# Patient Record
Sex: FEMALE | Race: WHITE | NOT HISPANIC OR LATINO | Employment: FULL TIME
[De-identification: names, ages, dates, MRNs, and addresses within clinical notes are randomized per-mention and may not be internally consistent; named-entity substitution may affect disease eponyms.]

---

## 2017-01-13 ENCOUNTER — RECORDS - HEALTHEAST (OUTPATIENT)
Dept: ADMINISTRATIVE | Facility: OTHER | Age: 32
End: 2017-01-13

## 2017-01-14 ENCOUNTER — COMMUNICATION - HEALTHEAST (OUTPATIENT)
Dept: FAMILY MEDICINE | Facility: CLINIC | Age: 32
End: 2017-01-14

## 2017-02-24 ENCOUNTER — COMMUNICATION - HEALTHEAST (OUTPATIENT)
Dept: FAMILY MEDICINE | Facility: CLINIC | Age: 32
End: 2017-02-24

## 2017-03-11 ENCOUNTER — RECORDS - HEALTHEAST (OUTPATIENT)
Dept: ADMINISTRATIVE | Facility: OTHER | Age: 32
End: 2017-03-11

## 2017-05-11 ENCOUNTER — RECORDS - HEALTHEAST (OUTPATIENT)
Dept: ADMINISTRATIVE | Facility: OTHER | Age: 32
End: 2017-05-11

## 2017-06-03 ENCOUNTER — COMMUNICATION - HEALTHEAST (OUTPATIENT)
Dept: FAMILY MEDICINE | Facility: CLINIC | Age: 32
End: 2017-06-03

## 2017-08-02 ENCOUNTER — COMMUNICATION - HEALTHEAST (OUTPATIENT)
Dept: FAMILY MEDICINE | Facility: CLINIC | Age: 32
End: 2017-08-02

## 2017-08-21 ENCOUNTER — AMBULATORY - HEALTHEAST (OUTPATIENT)
Dept: FAMILY MEDICINE | Facility: CLINIC | Age: 32
End: 2017-08-21

## 2017-08-21 ENCOUNTER — OFFICE VISIT - HEALTHEAST (OUTPATIENT)
Dept: FAMILY MEDICINE | Facility: CLINIC | Age: 32
End: 2017-08-21

## 2017-08-21 DIAGNOSIS — J20.9 BRONCHITIS WITH BRONCHOSPASM: ICD-10-CM

## 2017-08-21 DIAGNOSIS — R06.2 WHEEZING: ICD-10-CM

## 2017-08-21 ASSESSMENT — MIFFLIN-ST. JEOR: SCORE: 1468.79

## 2017-10-03 ENCOUNTER — COMMUNICATION - HEALTHEAST (OUTPATIENT)
Dept: FAMILY MEDICINE | Facility: CLINIC | Age: 32
End: 2017-10-03

## 2017-11-15 ENCOUNTER — OFFICE VISIT - HEALTHEAST (OUTPATIENT)
Dept: FAMILY MEDICINE | Facility: CLINIC | Age: 32
End: 2017-11-15

## 2017-11-15 DIAGNOSIS — F17.200 TOBACCO USE DISORDER: ICD-10-CM

## 2017-11-15 DIAGNOSIS — J01.90 ACUTE SINUSITIS: ICD-10-CM

## 2017-11-15 DIAGNOSIS — J45.41 MODERATE PERSISTENT ASTHMA WITH EXACERBATION: ICD-10-CM

## 2017-11-15 DIAGNOSIS — R05.9 COUGH: ICD-10-CM

## 2018-05-31 ENCOUNTER — OFFICE VISIT - HEALTHEAST (OUTPATIENT)
Dept: FAMILY MEDICINE | Facility: CLINIC | Age: 33
End: 2018-05-31

## 2018-05-31 DIAGNOSIS — B00.9 HERPES SIMPLEX TYPE 1 INFECTION: ICD-10-CM

## 2018-05-31 DIAGNOSIS — H10.13 ALLERGIC CONJUNCTIVITIS OF BOTH EYES: ICD-10-CM

## 2018-05-31 ASSESSMENT — MIFFLIN-ST. JEOR: SCORE: 1484.67

## 2018-06-08 ENCOUNTER — OFFICE VISIT - HEALTHEAST (OUTPATIENT)
Dept: FAMILY MEDICINE | Facility: CLINIC | Age: 33
End: 2018-06-08

## 2018-06-08 DIAGNOSIS — H10.9 CONJUNCTIVITIS: ICD-10-CM

## 2018-06-08 ASSESSMENT — MIFFLIN-ST. JEOR: SCORE: 1482.4

## 2018-07-09 ENCOUNTER — COMMUNICATION - HEALTHEAST (OUTPATIENT)
Dept: FAMILY MEDICINE | Facility: CLINIC | Age: 33
End: 2018-07-09

## 2018-07-10 ENCOUNTER — COMMUNICATION - HEALTHEAST (OUTPATIENT)
Dept: FAMILY MEDICINE | Facility: CLINIC | Age: 33
End: 2018-07-10

## 2018-07-13 ENCOUNTER — AMBULATORY - HEALTHEAST (OUTPATIENT)
Dept: FAMILY MEDICINE | Facility: CLINIC | Age: 33
End: 2018-07-13

## 2018-07-13 ENCOUNTER — OFFICE VISIT - HEALTHEAST (OUTPATIENT)
Dept: FAMILY MEDICINE | Facility: CLINIC | Age: 33
End: 2018-07-13

## 2018-07-13 DIAGNOSIS — H10.9 CONJUNCTIVITIS: ICD-10-CM

## 2018-07-25 ENCOUNTER — COMMUNICATION - HEALTHEAST (OUTPATIENT)
Dept: FAMILY MEDICINE | Facility: CLINIC | Age: 33
End: 2018-07-25

## 2018-11-23 ENCOUNTER — OFFICE VISIT - HEALTHEAST (OUTPATIENT)
Dept: FAMILY MEDICINE | Facility: CLINIC | Age: 33
End: 2018-11-23

## 2018-11-23 DIAGNOSIS — G43.829 MENSTRUAL MIGRAINE WITHOUT STATUS MIGRAINOSUS, NOT INTRACTABLE: ICD-10-CM

## 2018-11-23 DIAGNOSIS — L20.9 ATOPIC DERMATITIS, UNSPECIFIED TYPE: ICD-10-CM

## 2018-11-23 DIAGNOSIS — Z30.09 ENCOUNTER FOR COUNSELING REGARDING CONTRACEPTION: ICD-10-CM

## 2018-11-23 ASSESSMENT — MIFFLIN-ST. JEOR: SCORE: 1455.19

## 2018-11-29 ENCOUNTER — OFFICE VISIT - HEALTHEAST (OUTPATIENT)
Dept: FAMILY MEDICINE | Facility: CLINIC | Age: 33
End: 2018-11-29

## 2018-11-29 DIAGNOSIS — L50.9 URTICARIA: ICD-10-CM

## 2018-12-07 ENCOUNTER — OFFICE VISIT - HEALTHEAST (OUTPATIENT)
Dept: FAMILY MEDICINE | Facility: CLINIC | Age: 33
End: 2018-12-07

## 2018-12-07 DIAGNOSIS — R21 RASH IN ADULT: ICD-10-CM

## 2018-12-31 ENCOUNTER — RECORDS - HEALTHEAST (OUTPATIENT)
Dept: ADMINISTRATIVE | Facility: OTHER | Age: 33
End: 2018-12-31

## 2019-01-25 ENCOUNTER — OFFICE VISIT - HEALTHEAST (OUTPATIENT)
Dept: FAMILY MEDICINE | Facility: CLINIC | Age: 34
End: 2019-01-25

## 2019-01-25 DIAGNOSIS — B00.9 HERPES SIMPLEX TYPE 1 INFECTION: ICD-10-CM

## 2019-01-25 DIAGNOSIS — F17.200 SMOKING: ICD-10-CM

## 2019-01-25 DIAGNOSIS — J45.41 MODERATE PERSISTENT ASTHMA WITH EXACERBATION: ICD-10-CM

## 2019-01-25 DIAGNOSIS — J06.9 UPPER RESPIRATORY TRACT INFECTION, UNSPECIFIED TYPE: ICD-10-CM

## 2019-01-25 RX ORDER — ALBUTEROL SULFATE 90 UG/1
2 AEROSOL, METERED RESPIRATORY (INHALATION) EVERY 4 HOURS PRN
Qty: 1 EACH | Refills: 0 | Status: SHIPPED | OUTPATIENT
Start: 2019-01-25 | End: 2021-11-05

## 2019-01-25 RX ORDER — BUDESONIDE AND FORMOTEROL FUMARATE DIHYDRATE 160; 4.5 UG/1; UG/1
2 AEROSOL RESPIRATORY (INHALATION) 2 TIMES DAILY
Qty: 1 INHALER | Refills: 1 | Status: SHIPPED | OUTPATIENT
Start: 2019-01-25 | End: 2021-11-05

## 2019-01-25 ASSESSMENT — MIFFLIN-ST. JEOR: SCORE: 1491.47

## 2019-01-31 ENCOUNTER — OFFICE VISIT - HEALTHEAST (OUTPATIENT)
Dept: FAMILY MEDICINE | Facility: CLINIC | Age: 34
End: 2019-01-31

## 2019-01-31 DIAGNOSIS — E70.1 PHENYLKETONURIA (PKU) (H): ICD-10-CM

## 2019-01-31 DIAGNOSIS — F17.200 SMOKING: ICD-10-CM

## 2019-01-31 DIAGNOSIS — J20.9 ACUTE BRONCHITIS, UNSPECIFIED ORGANISM: ICD-10-CM

## 2019-01-31 ASSESSMENT — MIFFLIN-ST. JEOR: SCORE: 1514.15

## 2019-03-19 ENCOUNTER — COMMUNICATION - HEALTHEAST (OUTPATIENT)
Dept: FAMILY MEDICINE | Facility: CLINIC | Age: 34
End: 2019-03-19

## 2019-06-24 ENCOUNTER — OFFICE VISIT - HEALTHEAST (OUTPATIENT)
Dept: FAMILY MEDICINE | Facility: CLINIC | Age: 34
End: 2019-06-24

## 2019-06-24 DIAGNOSIS — F17.200 SMOKING: ICD-10-CM

## 2019-06-24 DIAGNOSIS — B00.9 HERPES SIMPLEX TYPE 1 INFECTION: ICD-10-CM

## 2019-06-24 DIAGNOSIS — E66.3 OVERWEIGHT: ICD-10-CM

## 2019-06-24 DIAGNOSIS — Z30.09 BIRTH CONTROL COUNSELING: ICD-10-CM

## 2019-06-24 DIAGNOSIS — B07.9 VIRAL WARTS, UNSPECIFIED TYPE: ICD-10-CM

## 2019-06-24 ASSESSMENT — MIFFLIN-ST. JEOR: SCORE: 1446.57

## 2019-07-12 ENCOUNTER — OFFICE VISIT - HEALTHEAST (OUTPATIENT)
Dept: FAMILY MEDICINE | Facility: CLINIC | Age: 34
End: 2019-07-12

## 2019-07-12 DIAGNOSIS — B07.9 VIRAL WARTS, UNSPECIFIED TYPE: ICD-10-CM

## 2019-07-12 ASSESSMENT — MIFFLIN-ST. JEOR: SCORE: 1438.86

## 2019-09-12 ENCOUNTER — COMMUNICATION - HEALTHEAST (OUTPATIENT)
Dept: FAMILY MEDICINE | Facility: CLINIC | Age: 34
End: 2019-09-12

## 2019-09-16 ENCOUNTER — COMMUNICATION - HEALTHEAST (OUTPATIENT)
Dept: FAMILY MEDICINE | Facility: CLINIC | Age: 34
End: 2019-09-16

## 2019-09-16 DIAGNOSIS — G43.829 MENSTRUAL MIGRAINE WITHOUT STATUS MIGRAINOSUS, NOT INTRACTABLE: ICD-10-CM

## 2019-09-16 DIAGNOSIS — Z30.09 BIRTH CONTROL COUNSELING: ICD-10-CM

## 2019-09-26 ENCOUNTER — OFFICE VISIT - HEALTHEAST (OUTPATIENT)
Dept: FAMILY MEDICINE | Facility: CLINIC | Age: 34
End: 2019-09-26

## 2019-09-26 DIAGNOSIS — J45.41 MODERATE PERSISTENT ASTHMA WITH EXACERBATION: ICD-10-CM

## 2019-09-26 DIAGNOSIS — F17.200 SMOKING: ICD-10-CM

## 2019-09-26 DIAGNOSIS — R05.9 COUGH: ICD-10-CM

## 2019-09-26 DIAGNOSIS — J20.9 ACUTE BRONCHITIS, UNSPECIFIED ORGANISM: ICD-10-CM

## 2019-09-26 ASSESSMENT — MIFFLIN-ST. JEOR: SCORE: 1460.18

## 2019-12-12 ENCOUNTER — COMMUNICATION - HEALTHEAST (OUTPATIENT)
Dept: FAMILY MEDICINE | Facility: CLINIC | Age: 34
End: 2019-12-12

## 2019-12-12 DIAGNOSIS — G43.829 MENSTRUAL MIGRAINE WITHOUT STATUS MIGRAINOSUS, NOT INTRACTABLE: ICD-10-CM

## 2020-01-24 ENCOUNTER — COMMUNICATION - HEALTHEAST (OUTPATIENT)
Dept: FAMILY MEDICINE | Facility: CLINIC | Age: 35
End: 2020-01-24

## 2020-01-24 DIAGNOSIS — G43.829 MENSTRUAL MIGRAINE WITHOUT STATUS MIGRAINOSUS, NOT INTRACTABLE: ICD-10-CM

## 2020-02-10 ENCOUNTER — COMMUNICATION - HEALTHEAST (OUTPATIENT)
Dept: FAMILY MEDICINE | Facility: CLINIC | Age: 35
End: 2020-02-10

## 2020-02-10 DIAGNOSIS — B00.9 HERPES SIMPLEX TYPE 1 INFECTION: ICD-10-CM

## 2020-05-26 ENCOUNTER — COMMUNICATION - HEALTHEAST (OUTPATIENT)
Dept: FAMILY MEDICINE | Facility: CLINIC | Age: 35
End: 2020-05-26

## 2020-05-26 DIAGNOSIS — G43.829 MENSTRUAL MIGRAINE WITHOUT STATUS MIGRAINOSUS, NOT INTRACTABLE: ICD-10-CM

## 2020-07-22 ENCOUNTER — COMMUNICATION - HEALTHEAST (OUTPATIENT)
Dept: FAMILY MEDICINE | Facility: CLINIC | Age: 35
End: 2020-07-22

## 2020-07-22 DIAGNOSIS — G43.829 MENSTRUAL MIGRAINE WITHOUT STATUS MIGRAINOSUS, NOT INTRACTABLE: ICD-10-CM

## 2020-07-31 ENCOUNTER — COMMUNICATION - HEALTHEAST (OUTPATIENT)
Dept: SCHEDULING | Facility: CLINIC | Age: 35
End: 2020-07-31

## 2020-07-31 ENCOUNTER — OFFICE VISIT - HEALTHEAST (OUTPATIENT)
Dept: FAMILY MEDICINE | Facility: CLINIC | Age: 35
End: 2020-07-31

## 2020-07-31 DIAGNOSIS — J45.40 MODERATE PERSISTENT ASTHMA WITHOUT COMPLICATION: ICD-10-CM

## 2020-07-31 DIAGNOSIS — J01.00 ACUTE NON-RECURRENT MAXILLARY SINUSITIS: ICD-10-CM

## 2020-07-31 DIAGNOSIS — K08.89 PAIN, DENTAL: ICD-10-CM

## 2020-09-18 ENCOUNTER — COMMUNICATION - HEALTHEAST (OUTPATIENT)
Dept: FAMILY MEDICINE | Facility: CLINIC | Age: 35
End: 2020-09-18

## 2020-09-18 DIAGNOSIS — Z30.09 BIRTH CONTROL COUNSELING: ICD-10-CM

## 2020-09-18 DIAGNOSIS — G43.829 MENSTRUAL MIGRAINE WITHOUT STATUS MIGRAINOSUS, NOT INTRACTABLE: ICD-10-CM

## 2020-09-20 RX ORDER — NORELGESTROMIN AND ETHINYL ESTRADIOL 150; 35 UG/D; UG/D
PATCH TRANSDERMAL
Qty: 12 PATCH | Refills: 2 | Status: SHIPPED | OUTPATIENT
Start: 2020-09-20 | End: 2021-11-05

## 2020-12-21 ENCOUNTER — COMMUNICATION - HEALTHEAST (OUTPATIENT)
Dept: FAMILY MEDICINE | Facility: CLINIC | Age: 35
End: 2020-12-21

## 2020-12-21 DIAGNOSIS — G43.829 MENSTRUAL MIGRAINE WITHOUT STATUS MIGRAINOSUS, NOT INTRACTABLE: ICD-10-CM

## 2021-03-26 ENCOUNTER — COMMUNICATION - HEALTHEAST (OUTPATIENT)
Dept: FAMILY MEDICINE | Facility: CLINIC | Age: 36
End: 2021-03-26

## 2021-03-26 DIAGNOSIS — G43.829 MENSTRUAL MIGRAINE WITHOUT STATUS MIGRAINOSUS, NOT INTRACTABLE: ICD-10-CM

## 2021-03-27 RX ORDER — NAPROXEN 500 MG/1
500 TABLET ORAL 2 TIMES DAILY WITH MEALS
Qty: 60 TABLET | Refills: 0 | Status: SHIPPED | OUTPATIENT
Start: 2021-03-27 | End: 2021-11-05

## 2021-03-29 ENCOUNTER — COMMUNICATION - HEALTHEAST (OUTPATIENT)
Dept: FAMILY MEDICINE | Facility: CLINIC | Age: 36
End: 2021-03-29

## 2021-03-29 DIAGNOSIS — G43.829 MENSTRUAL MIGRAINE WITHOUT STATUS MIGRAINOSUS, NOT INTRACTABLE: ICD-10-CM

## 2021-03-29 DIAGNOSIS — B00.9 HERPES SIMPLEX TYPE 1 INFECTION: ICD-10-CM

## 2021-04-05 ENCOUNTER — COMMUNICATION - HEALTHEAST (OUTPATIENT)
Dept: FAMILY MEDICINE | Facility: CLINIC | Age: 36
End: 2021-04-05

## 2021-04-05 DIAGNOSIS — B00.9 HERPES SIMPLEX TYPE 1 INFECTION: ICD-10-CM

## 2021-04-30 ENCOUNTER — COMMUNICATION - HEALTHEAST (OUTPATIENT)
Dept: FAMILY MEDICINE | Facility: CLINIC | Age: 36
End: 2021-04-30

## 2021-04-30 DIAGNOSIS — B00.9 HERPES SIMPLEX TYPE 1 INFECTION: ICD-10-CM

## 2021-05-02 RX ORDER — VALACYCLOVIR HYDROCHLORIDE 1 G/1
TABLET, FILM COATED ORAL
Qty: 4 TABLET | Refills: 1 | Status: SHIPPED | OUTPATIENT
Start: 2021-05-02 | End: 2021-11-05

## 2021-05-03 ENCOUNTER — COMMUNICATION - HEALTHEAST (OUTPATIENT)
Dept: FAMILY MEDICINE | Facility: CLINIC | Age: 36
End: 2021-05-03

## 2021-05-28 ASSESSMENT — ASTHMA QUESTIONNAIRES: ACT_TOTALSCORE: 15

## 2021-05-29 NOTE — PROGRESS NOTES
Assessment and Plan:     1. Viral warts, unspecified type  Discussed proper wound care.  If no improvement symptoms in 2 to 4 weeks, she is to follow-up for more cryotherapy.    2. Herpes simplex type 1 infection  She continues valacyclovir as needed.  - valACYclovir (VALTREX) 1000 MG tablet; Take 2 tablets (2,000 mg total) by mouth 2 (two) times a day.  Dispense: 4 tablet; Refill: 6    3. Birth control counseling  Renewed contraceptive patch.  Educated on its indications and side effects including increased risk of blood clots.  - norelgestromin-ethinyl estradiol (XULANE) 150-35 mcg/24 hr; APPLY 1 PATCH EXTERNALLY TO THE SKIN 1 TIME A WEEK  Dispense: 9 patch; Refill: 0    4. Overweight  The following high BMI interventions were performed this visit: encouragement to exercise and lifestyle education regarding diet    5.  Smoking  I have counseled the patient for tobacco cessation and the follow up will occur  at the next visit.    She will schedule a physical within the next 3 months.     Subjective:     Judy is a 34 y.o. female presenting to the clinic for multiple concerns today.  Patient has a wart present on her right hand second finger.  She believes she has had it for over 2 years.  It has not changed in size.  It is not painful.  She has not tried any over-the-counter products for her symptoms.  Patient requests renewal of her contraceptive patch.  She has been a relationship for 20 years.  She has no concerns for STDs.  Her last menstrual period was 2 weeks ago.  Her periods are regular, occurring once per month.  She denies any personal family history of blood clots.  She does continue to smoke cigarettes but states she has cut back.  She requests valacyclovir to use as needed for cold sores.  She denies any recent outbreaks.    Review of Systems: A complete 14 point review of systems was obtained and is negative or as stated in the history of present illness.    Social History     Socioeconomic History  "    Marital status: Single     Spouse name: Not on file     Number of children: Not on file     Years of education: Not on file     Highest education level: Not on file   Occupational History     Not on file   Social Needs     Financial resource strain: Not on file     Food insecurity:     Worry: Not on file     Inability: Not on file     Transportation needs:     Medical: Not on file     Non-medical: Not on file   Tobacco Use     Smoking status: Current Every Day Smoker     Packs/day: 1.00     Smokeless tobacco: Never Used   Substance and Sexual Activity     Alcohol use: No     Frequency: Never     Drug use: No     Sexual activity: Not on file   Lifestyle     Physical activity:     Days per week: Not on file     Minutes per session: Not on file     Stress: Not on file   Relationships     Social connections:     Talks on phone: Not on file     Gets together: Not on file     Attends Jewish service: Not on file     Active member of club or organization: Not on file     Attends meetings of clubs or organizations: Not on file     Relationship status: Not on file     Intimate partner violence:     Fear of current or ex partner: Not on file     Emotionally abused: Not on file     Physically abused: Not on file     Forced sexual activity: Not on file   Other Topics Concern     Not on file   Social History Narrative     Not on file       Active Ambulatory Problems     Diagnosis Date Noted     Phenylketonuria      Anxiety      Attention-deficit Hyperactivity Disorder      Smoking 10/04/2016     Resolved Ambulatory Problems     Diagnosis Date Noted     Cerumen Impaction In Both Ears      Sinusitis      Acute bronchitis      Conjunctivitis      No Additional Past Medical History       No family history on file.    Objective:     /68   Pulse 90   Ht 5' 4\" (1.626 m)   Wt 170 lb 1.6 oz (77.2 kg)   SpO2 98%   BMI 29.20 kg/m      Patient is alert, in no obvious distress.   Skin: Warm, dry.   Lungs:  Clear to " auscultation. Respirations even and unlabored.  No wheezing or rales noted.   Heart:  Regular rate and rhythm.  No murmurs.   Musculoskeletal:  She has a small wart present on her right hand, 3rd finger.  I debrided the area, performed cryotherapy freeze-thaw-freeze cycle x 3.  Patient tolerated the procedure well.

## 2021-05-31 VITALS — WEIGHT: 175 LBS | BODY MASS INDEX: 29.88 KG/M2 | HEIGHT: 64 IN

## 2021-05-31 VITALS — WEIGHT: 177 LBS | BODY MASS INDEX: 30.38 KG/M2

## 2021-06-01 ENCOUNTER — COMMUNICATION - HEALTHEAST (OUTPATIENT)
Dept: FAMILY MEDICINE | Facility: CLINIC | Age: 36
End: 2021-06-01

## 2021-06-01 VITALS — WEIGHT: 178 LBS | HEIGHT: 64 IN | BODY MASS INDEX: 30.39 KG/M2

## 2021-06-01 VITALS — WEIGHT: 172 LBS | BODY MASS INDEX: 29.52 KG/M2

## 2021-06-01 VITALS — BODY MASS INDEX: 30.48 KG/M2 | WEIGHT: 178.5 LBS | HEIGHT: 64 IN

## 2021-06-01 DIAGNOSIS — B00.9 HERPES SIMPLEX TYPE 1 INFECTION: ICD-10-CM

## 2021-06-01 NOTE — TELEPHONE ENCOUNTER
RN cannot approve Refill Request    RN can NOT refill this medication med is not covered by policy/route to provider     . Last office visit: 11/23/2018 Abdulkadir Shoemaker MD Last Physical: Visit date not found Last MTM visit: Visit date not found Last visit same specialty: 7/12/2019 Luz Carrasco CNP.  Next visit within 3 mo: Visit date not found  Next physical within 3 mo: Visit date not found      Kyleigh Hay, [facility] Triage/Med Refill 9/16/2019    Requested Prescriptions   Pending Prescriptions Disp Refills     naproxen (NAPROSYN) 500 MG tablet [Pharmacy Med Name: NAPROXEN 500MG TABLETS] 60 tablet 11     Sig: TAKE 1 TABLET(500 MG) BY MOUTH TWICE DAILY WITH MEALS       There is no refill protocol information for this order

## 2021-06-01 NOTE — PROGRESS NOTES
Assessment and Plan:     1. Acute bronchitis, unspecified organism  azithromycin (ZITHROMAX) 250 MG tablet   2. Moderate persistent asthma with exacerbation  predniSONE (DELTASONE) 20 MG tablet   3. Cough  codeine-guaiFENesin (GUAIFENESIN AC)  mg/5 mL liquid   4. Smoking       Unfortunately, x-ray is not available in our clinic.  She does not want to travel to another clinic.  Will treat for bronchitis with a Z-Jay, take as directed.  Educated on its indications and side effects.  Provided prescription for prednisone for asthma exacerbation.  Patient will continue Symbicort daily and albuterol as needed.  Provided prescription for guaifenesin with codeine to use as needed for cough.  She is to avoid taking this with other sedatives.  Discussed smoking cessation options, but she declines.  She will follow-up if symptoms persist or worsen.    Subjective:     Judy is a 34 y.o. female presenting to the clinic for concerns for cold symptoms for 7 days.  Patient complains of sinus congestion, postnasal drainage, productive cough, shortness of breath, chest tightness, wheezing.  She has had difficulty sleeping at night.  She complains of lack of appetite and headache.  She has been using her Symbicort.  She states she has not needed to use her albuterol.  Her boss is also ill.  She has tried taking over-the-counter Kiarra-Volin and Robitussin.  She feels as though her symptoms are worsening.  She does continue to smoke.    Review of Systems: A complete 14 point review of systems was obtained and is negative or as stated in the history of present illness.    Social History     Socioeconomic History     Marital status: Single     Spouse name: Not on file     Number of children: Not on file     Years of education: Not on file     Highest education level: Not on file   Occupational History     Not on file   Social Needs     Financial resource strain: Not on file     Food insecurity:     Worry: Not on file      "Inability: Not on file     Transportation needs:     Medical: Not on file     Non-medical: Not on file   Tobacco Use     Smoking status: Current Every Day Smoker     Packs/day: 1.00     Smokeless tobacco: Never Used   Substance and Sexual Activity     Alcohol use: No     Frequency: Never     Drug use: No     Sexual activity: Not on file   Lifestyle     Physical activity:     Days per week: Not on file     Minutes per session: Not on file     Stress: Not on file   Relationships     Social connections:     Talks on phone: Not on file     Gets together: Not on file     Attends Sabianist service: Not on file     Active member of club or organization: Not on file     Attends meetings of clubs or organizations: Not on file     Relationship status: Not on file     Intimate partner violence:     Fear of current or ex partner: Not on file     Emotionally abused: Not on file     Physically abused: Not on file     Forced sexual activity: Not on file   Other Topics Concern     Not on file   Social History Narrative     Not on file       Active Ambulatory Problems     Diagnosis Date Noted     Phenylketonuria      Anxiety      Attention deficit hyperactivity disorder (ADHD)      Smoking 10/04/2016     Resolved Ambulatory Problems     Diagnosis Date Noted     Cerumen Impaction In Both Ears      Sinusitis      Acute bronchitis      Conjunctivitis      No Additional Past Medical History       No family history on file.    Objective:     /66   Pulse 75   Temp 97.9  F (36.6  C) (Oral)   Ht 5' 4\" (1.626 m)   Wt 173 lb 1.6 oz (78.5 kg)   SpO2 97%   BMI 29.71 kg/m      Patient is alert, in no obvious distress.   Skin: Warm, dry.  No lesions or rashes.  Skin turgor rapid return.   HEENT:  Head normocephalic, atraumatic.  Eyes normal. Ears normal.  Nose patent, mucosa pink.  Oropharynx mucosa pink.  No lesions or tonsillar enlargement.   Neck: Supple, no lymphadenopathy.   Lungs:  Inspiratory wheezing heard throughout the " lungs.  Expiratory rhonchi heard in bilateral lower lung bases. Respirations even and unlabored.  No wheezing or rales noted.   Heart:  Regular rate and rhythm.  No murmurs.

## 2021-06-01 NOTE — TELEPHONE ENCOUNTER
Refill Approved    Rx renewed per Medication Renewal Policy. Medication was last renewed on 6/24/19.    Kyleigh Hay, [facility] Triage/Med Refill 9/16/2019     Requested Prescriptions   Pending Prescriptions Disp Refills     XULANE 150-35 mcg/24 hr [Pharmacy Med Name: XULANE PATCHES] 9 patch 0     Sig: APPLY 1 PATCH EXTERNALLY TO THE SKIN 1 TIME A WEEK       Oral Contraceptives Protocol Passed - 9/16/2019  3:24 PM        Passed - Visit with PCP or prescribing provider visit in last 12 months      Last office visit with prescriber/PCP: 7/12/2019 Luz Carrasco CNP OR same dept: 7/12/2019 Luz Carrasco CNP OR same specialty: 7/12/2019 Luz Carrasco CNP  Last physical: Visit date not found Last MTM visit: Visit date not found   Next visit within 3 mo: Visit date not found  Next physical within 3 mo: Visit date not found  Prescriber OR PCP: Luz Carrasco CNP  Last diagnosis associated with med order: 1. Birth control counseling  - XULANE 150-35 mcg/24 hr [Pharmacy Med Name: XULANE PATCHES]; APPLY 1 PATCH EXTERNALLY TO THE SKIN 1 TIME A WEEK  Dispense: 9 patch; Refill: 0    If protocol passes may refill for 12 months if within 3 months of last provider visit (or a total of 15 months).

## 2021-06-02 ENCOUNTER — RECORDS - HEALTHEAST (OUTPATIENT)
Dept: ADMINISTRATIVE | Facility: CLINIC | Age: 36
End: 2021-06-02

## 2021-06-02 VITALS — BODY MASS INDEX: 30.73 KG/M2 | HEIGHT: 64 IN | WEIGHT: 180 LBS

## 2021-06-02 VITALS — WEIGHT: 169.19 LBS | BODY MASS INDEX: 29.04 KG/M2

## 2021-06-02 VITALS — BODY MASS INDEX: 29.37 KG/M2 | HEIGHT: 64 IN | WEIGHT: 172 LBS

## 2021-06-02 VITALS — BODY MASS INDEX: 31.58 KG/M2 | WEIGHT: 185 LBS | HEIGHT: 64 IN

## 2021-06-02 VITALS — BODY MASS INDEX: 29.01 KG/M2 | WEIGHT: 169 LBS

## 2021-06-03 VITALS — BODY MASS INDEX: 29.04 KG/M2 | WEIGHT: 170.1 LBS | HEIGHT: 64 IN

## 2021-06-03 VITALS — WEIGHT: 168.4 LBS | HEIGHT: 64 IN | BODY MASS INDEX: 28.75 KG/M2

## 2021-06-03 VITALS
DIASTOLIC BLOOD PRESSURE: 66 MMHG | SYSTOLIC BLOOD PRESSURE: 116 MMHG | HEIGHT: 64 IN | WEIGHT: 173.1 LBS | OXYGEN SATURATION: 97 % | HEART RATE: 75 BPM | BODY MASS INDEX: 29.55 KG/M2 | TEMPERATURE: 97.9 F

## 2021-06-04 NOTE — TELEPHONE ENCOUNTER
RN cannot approve Refill Request    RN can NOT refill this medication med is not covered by policy/route to provider. Last office visit: 9/26/2019 Luz Carrasco CNP Last Physical: Visit date not found Last MTM visit: Visit date not found Last visit same specialty: 9/26/2019 Lzu Carrasco CNP.  Next visit within 3 mo: Visit date not found  Next physical within 3 mo: Visit date not found      Jaden Link, [facility] Triage/Med Refill 12/13/2019    Requested Prescriptions   Pending Prescriptions Disp Refills     naproxen (NAPROSYN) 500 MG tablet [Pharmacy Med Name: NAPROXEN 500MG TABLETS] 60 tablet 0     Sig: TAKE 1 TABLET(500 MG) BY MOUTH TWICE DAILY WITH MEALS       There is no refill protocol information for this order

## 2021-06-05 NOTE — TELEPHONE ENCOUNTER
RN cannot approve Refill Request    RN can NOT refill this medication med is not covered by policy/route to provider. Last office visit: 9/26/2019 Luz Carrasco CNP Last Physical: Visit date not found Last MTM visit: Visit date not found Last visit same specialty: 9/26/2019 Luz Carrasco CNP.  Next visit within 3 mo: Visit date not found  Next physical within 3 mo: Visit date not found      Dede Alan, [facility] Triage/Med Refill 1/25/2020    Requested Prescriptions   Pending Prescriptions Disp Refills     naproxen (NAPROSYN) 500 MG tablet [Pharmacy Med Name: NAPROXEN 500MG TABLETS] 60 tablet 0     Sig: TAKE 1 TABLET(500 MG) BY MOUTH TWICE DAILY WITH MEALS       There is no refill protocol information for this order

## 2021-06-08 ENCOUNTER — COMMUNICATION - HEALTHEAST (OUTPATIENT)
Dept: FAMILY MEDICINE | Facility: CLINIC | Age: 36
End: 2021-06-08

## 2021-06-08 DIAGNOSIS — B00.9 HERPES SIMPLEX TYPE 1 INFECTION: ICD-10-CM

## 2021-06-08 NOTE — TELEPHONE ENCOUNTER
RN cannot approve Refill Request    RN can NOT refill this medication med is not covered by policy/route to provider     . Last office visit: 9/26/2019 Luz Carrasco CNP Last Physical: Visit date not found Last MTM visit: Visit date not found Last visit same specialty: 9/26/2019 Luz Carrasco CNP.  Next visit within 3 mo: Visit date not found  Next physical within 3 mo: Visit date not found      Kyleigh Hay, [facility] Triage/Med Refill 5/28/2020    Requested Prescriptions   Pending Prescriptions Disp Refills     naproxen (NAPROSYN) 500 MG tablet [Pharmacy Med Name: NAPROXEN 500MG TABLETS] 60 tablet 0     Sig: TAKE 1 TABLET BY MOUTH TWICE DAILY WITH MEALS       There is no refill protocol information for this order

## 2021-06-09 NOTE — TELEPHONE ENCOUNTER
RN cannot approve Refill Request    RN can NOT refill this medication med is not covered by policy/route to provider. Last office visit: 9/26/2019 Luz Carrasco CNP Last Physical: Visit date not found Last MTM visit: Visit date not found Last visit same specialty: 9/26/2019 Luz Carrasco CNP.  Next visit within 3 mo: Visit date not found  Next physical within 3 mo: Visit date not found      Dede Alan, [facility] Triage/Med Refill 7/22/2020    Requested Prescriptions   Pending Prescriptions Disp Refills     naproxen (NAPROSYN) 500 MG tablet [Pharmacy Med Name: NAPROXEN 500MG TABLETS] 60 tablet 0     Sig: TAKE 1 TABLET BY MOUTH TWICE DAILY WITH MEALS       There is no refill protocol information for this order

## 2021-06-10 NOTE — TELEPHONE ENCOUNTER
Rn Triage  Judy has chronic bronchitis- cough not worse than normal. She calls today because she thinks she may have a sinus infection. When she touches the roof of her mouth with her tongue she can feel pressure. Most discomfort is in teeth. No facial pressure or headaches.  Top right gums has a pea sized bump- can't tell if bump is filled with fluid or not. No fevers. Symptoms started 7/5/2020. Has been using sudafed, helps some.     I advised per care advice and recommended Judy have a virtual visit today with a provider. She agrees to this plan, scheduling to assist.    Kelley Leggett RN  [facility] Nurse Advisor      Reason for Disposition    Sinus congestion (pressure, fullness) present > 10 days    Additional Information    Negative: Sounds like a life-threatening emergency to the triager    Negative: Difficulty breathing, and not from stuffy nose (e.g., not relieved by cleaning out the nose)    Negative: SEVERE headache and has fever    Negative: Patient sounds very sick or weak to the triager    Negative: SEVERE sinus pain    Negative: Severe headache    Negative: Redness or swelling on the cheek, forehead, or around the eye    Negative: Fever > 103 F (39.4 C)    Negative: Fever > 101 F (38.3 C) and over 60 years of age    Negative: Fever > 100.0 F (37.8 C) and has diabetes mellitus or a weak immune system (e.g., HIV positive, cancer chemotherapy, organ transplant, splenectomy, chronic steroids)    Negative: Fever > 100.0 F (37.8 C) and bedridden (e.g., nursing home patient, stroke, chronic illness, recovering from surgery)    Negative: Fever present > 3 days (72 hours)    Negative: Fever returns after gone for over 24 hours and symptoms worse or not improved    Negative: Sinus pain (not just congestion) and fever    Negative: Earache    Protocols used: SINUS PAIN AND CONGESTION-A-[place]    COVID 19 Nurse Triage Plan/Patient Instructions    Please be aware that novel coronavirus (COVID-19) may be  circulating in the community. If you develop symptoms such as fever, cough, or SOB or if you have concerns about the presence of another infection including coronavirus (COVID-19), please contact your health care provider or visit [email or web address].     Disposition/Instructions    Virtual Visit with provider recommended. Reference Visit Selection Guide.    Thank you for taking steps to prevent the spread of this virus.  o Limit your contact with others.  o Wear a simple mask to cover your cough.  o Wash your hands well and often.    Resources    [facility]: About COVID-19: [email or web address]    CDC: What to Do If You're Sick: www.cdc.gov/coronavirus/2019-ncov/about/steps-when-sick.html    CDC: Ending Home Isolation: www.cdc.gov/coronavirus/2019-ncov/hcp/disposition-in-home-patients.html     CDC: Caring for Someone: www.cdc.gov/coronavirus/2019-ncov/if-you-are-sick/care-for-someone.html     [facility]: Interim Guidance for Hospital Discharge to Home: www.[facility].mn./diseases/coronavirus/hcp/hospdischarge.pdf    [facility] clinical trials (COVID-19 research studies): clinicalaffairs.[facility]-clinical-trials     Below are the COVID-19 hotlines at the [place] [facility] ([facility]). Interpreters are available.   o For health questions: Call 398-990-4332 or 1-633.405.4373 (7 a.m. to 7 p.m.)  o For questions about schools and childcare: Call 190-861-4553 or 1-789.260.1540 (7 a.m. to 7 p.m.)

## 2021-06-10 NOTE — PROGRESS NOTES
"Judy Peterson is a 35 y.o. female who is being evaluated via a billable telephone visit.      The patient has been notified of following:     \"This telephone visit will be conducted via a call between you and your physician/provider. We have found that certain health care needs can be provided without the need for a physical exam.  This service lets us provide the care you need with a short phone conversation.  If a prescription is necessary we can send it directly to your pharmacy.  If lab work is needed we can place an order for that and you can then stop by our lab to have the test done at a later time.    Telephone visits are billed at different rates depending on your insurance coverage. During this emergency period, for some insurers they may be billed the same as an in-person visit.  Please reach out to your insurance provider with any questions.    If during the course of the call the physician/provider feels a telephone visit is not appropriate, you will not be charged for this service.\"    Patient has given verbal consent to a Telephone visit? Yes    What phone number would you like to be contacted at? [phone]    Patient would like to receive their AVS by AVS Preference: [place].    Additional provider notes:   Assessment and Plan:   1. Acute non-recurrent maxillary sinusitis  amoxicillin-clavulanate (AUGMENTIN) 875-125 mg per tablet   2. Moderate persistent asthma without complication     3. Pain, dental       Will treat for sinusitis with Augmentin.  Educated on its indications and side effects.  Patient states she has had Augmentin in the past and has tolerated this.  This would also cover a dental infection.  I encouraged her to reach out to her orthodontist about her teeth pain.  Recommend using over-the-counter nasal saline sprays to assist with sinus congestion.  She does not feel as though she has an exacerbation of her asthma.  She will continue Symbicort daily and albuterol as needed.  " She is to follow-up if symptoms persist or worsen.  She declines COVID testing today.      Subjective:     Judy is a 35 y.o. female presenting to the clinic for a telephone visit.  Patient states she saw her dentist for a dental infection on July 1.  She was treated with penicillin.  Patient states she developed congestion and a cough over the 4th of July weekend.  Symptoms have persisted.  She complains of sinus congestion with purulent drainage, frontal headache, nonproductive cough, teeth pain, wheezing at bedtime.  She denies shortness of breath, chest tightness, stomachache, nausea, vomiting, fever.  She denies loss of sense of smell or taste.  She does have a bump on her right upper gum which is painful.  She has not traveled recently.  She denies any known COVID exposures.  She has a moderate persistent asthma and uses Symbicort daily and albuterol as needed.    Review of Systems: A complete 14 point review of systems was obtained and is negative or as stated in the history of present illness.    Social History     Socioeconomic History     Marital status: Single     Spouse name: Not on file     Number of children: Not on file     Years of education: Not on file     Highest education level: Not on file   Occupational History     Not on file   Social Needs     Financial resource strain: Not on file     Food insecurity     Worry: Not on file     Inability: Not on file     Transportation needs     Medical: Not on file     Non-medical: Not on file   Tobacco Use     Smoking status: Current Every Day Smoker     Packs/day: 1.00     Smokeless tobacco: Never Used   Substance and Sexual Activity     Alcohol use: No     Frequency: Never     Drug use: No     Sexual activity: Not on file   Lifestyle     Physical activity     Days per week: Not on file     Minutes per session: Not on file     Stress: Not on file   Relationships     Social connections     Talks on phone: Not on file     Gets together: Not on file      Attends Jain service: Not on file     Active member of club or organization: Not on file     Attends meetings of clubs or organizations: Not on file     Relationship status: Not on file     Intimate partner violence     Fear of current or ex partner: Not on file     Emotionally abused: Not on file     Physically abused: Not on file     Forced sexual activity: Not on file   Other Topics Concern     Not on file   Social History Narrative     Not on file       Active Ambulatory Problems     Diagnosis Date Noted     Phenylketonuria      Anxiety      Attention deficit hyperactivity disorder (ADHD)      Smoking 10/04/2016     Moderate persistent asthma without complication 07/31/2020     Resolved Ambulatory Problems     Diagnosis Date Noted     Cerumen Impaction In Both Ears      Sinusitis      Acute bronchitis      Conjunctivitis      No Additional Past Medical History       No family history on file.    Objective:     There were no vitals taken for this visit.    Patient is alert and is speaking clearly.  She does not sound short of breath or have a cough during the phone visit.         Phone call duration:  12 minutes    Luz Carrasco CNP

## 2021-06-11 NOTE — TELEPHONE ENCOUNTER
Refill Approved    Rx renewed per Medication Renewal Policy. Medication was last renewed on 9/16/19, last OV 7/31/20.    Randi Magana, [facility] Triage/Med Refill 9/20/2020     Requested Prescriptions   Pending Prescriptions Disp Refills     XULANE 150-35 mcg/24 hr [Pharmacy Med Name: XULANE PATCHES] 12 patch 3     Sig: APPLY 1 PATCH EXTERNALLY TO THE SKIN 1 TIME A WEEK       Oral Contraceptives Protocol Passed - 9/18/2020  3:15 AM        Passed - Visit with PCP or prescribing provider visit in last 12 months      Last office visit with prescriber/PCP: 9/26/2019 Luz Carrasco CNP OR same dept: 9/26/2019 Luz Carrasco CNP OR same specialty: 9/26/2019 Luz Carrasco CNP  Last physical: Visit date not found Last MTM visit: Visit date not found   Next visit within 3 mo: Visit date not found  Next physical within 3 mo: Visit date not found  Prescriber OR PCP: Luz Carrasco CNP  Last diagnosis associated with med order: 1. Birth control counseling  - XULANE 150-35 mcg/24 hr [Pharmacy Med Name: XULANE PATCHES]; APPLY 1 PATCH EXTERNALLY TO THE SKIN 1 TIME A WEEK  Dispense: 12 patch; Refill: 3    If protocol passes may refill for 12 months if within 3 months of last provider visit (or a total of 15 months).

## 2021-06-11 NOTE — TELEPHONE ENCOUNTER
RN cannot approve Refill Request    RN can NOT refill this medication med is not covered by policy/route to provider. Last office visit: 9/26/2019 Luz Carrasco CNP Last Physical: Visit date not found Last MTM visit: Visit date not found Last visit same specialty: 9/26/2019 Luz Carrasco CNP.  Next visit within 3 mo: Visit date not found  Next physical within 3 mo: Visit date not found      Randi Magana, [facility] Triage/Med Refill 9/19/2020    Requested Prescriptions   Pending Prescriptions Disp Refills     naproxen (NAPROSYN) 500 MG tablet 60 tablet 0     Sig: Take 1 tablet (500 mg total) by mouth 2 (two) times a day with meals.       There is no refill protocol information for this order

## 2021-06-11 NOTE — TELEPHONE ENCOUNTER
Refill Request  Did you contact pharmacy: Yes  Medication name:   Requested Prescriptions     Pending Prescriptions Disp Refills     naproxen (NAPROSYN) 500 MG tablet 60 tablet 0     Sig: Take 1 tablet (500 mg total) by mouth 2 (two) times a day with meals.     Who prescribed the medication: Luz Carrasco CNP    [facility]  Is patient out of medication: Yes  Patient notified refills processed in 3 business days:  yes  Okay to leave a detailed message: yes

## 2021-06-12 NOTE — PROGRESS NOTES
"  Assessment:     Judy was seen today for uri.    Diagnoses and all orders for this visit:    Bronchitis with bronchospasm  -     azithromycin (ZITHROMAX) 250 MG tablet; Take 2 tablets on day 1, then 1 tablet for 4 days.  -     codeine-guaiFENesin (GUAIFENESIN AC)  mg/5 mL liquid; Take 5 mL by mouth 3 (three) times a day as needed for cough.    Wheezing  -     albuterol (VENTOLIN HFA) 90 mcg/actuation inhaler; INHALE 2 PUFFS BY MOUTH EVERY 4 TO 6 HOURS AS NEEDED  -     predniSONE (DELTASONE) 10 mg tablet; 4 tabs for 3 days then 2 tabs for 3 days then 1 tab for 3 days then dc      Plan:     Patient has bronchitis with bronchospasm that is mild to moderate at this point it is not responding to her albuterol inhalers.  We will add a prednisone course today and and given antibiotic.  She says that the Robitussin with codeine does help her quite a bit sleep through the night so that Rx is given as well..    This is a 25 minute visit with greater than 50% of the time spent counseling regarding care of the lungs discussed with compliance of the above medicines reviewed.  Would suggest she DC smoking.  And we will can help her with Wellbutrin if she wishes.  She also will return for 30-40 minute physical to review other issues Pap smear and other healthcare prevention issues      Subjective:      Elyse is a 32 y.o. female presenting to my clinic for evaluation of cough.  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  She has 3 days of the severe cough.  Unable to sleep at night.  She says the cough syrup with codeine does help her.  In the past she has used prednisone from time to time.  Currently she feels the inhalers  \"are not working for her\".  She wants to quit smoking but she says it is \"very difficult\".    I see that she is due for a physical and a Pap and as before that she come in as I been refilling her birth control.  She does agree to do " "that.  She is in a relationship.      Current Outpatient Prescriptions on File Prior to Visit   Medication Sig Dispense Refill     norelgestromin-ethinyl estradiol (XULANE) 150-35 mcg/24 hr Place 1 patch on the skin once a week. 12 patch 0     budesonide-formoterol (SYMBICORT) 160-4.5 mcg/actuation inhaler Inhale 2 puffs 2 (two) times a day. 1 Inhaler 1     inhalational spacing device (AEROCHAMBER MV) Spcr To use with Symbicort 1 each 0     valACYclovir (VALTREX) 500 MG tablet TAKE 1 TABLET BY MOUTH EVERY DAY 30 tablet 0     [DISCONTINUED] codeine-guaiFENesin (GUAIFENESIN AC)  mg/5 mL liquid Take 5-10 mL by mouth 2 (two) times a day as needed for cough. 118 mL 0     [DISCONTINUED] VENTOLIN HFA 90 mcg/actuation inhaler INHALE 2 PUFFS BY MOUTH EVERY 4 TO 6 HOURS AS NEEDED 18 g 0     No current facility-administered medications on file prior to visit.      Allergies   Allergen Reactions     Cefprozil      History reviewed. No pertinent past medical history.  History reviewed. No pertinent surgical history.  Social History     Social History     Marital status: Single     Spouse name: N/A     Number of children: N/A     Years of education: N/A     Occupational History     Not on file.     Social History Main Topics     Smoking status: Current Every Day Smoker     Packs/day: 1.00     Smokeless tobacco: Not on file     Alcohol use Not on file     Drug use: Not on file     Sexual activity: Not on file     Other Topics Concern     Not on file     Social History Narrative     History reviewed. No pertinent family history.    ROS:  I have performed a 10 point ROS.  All pertinent positives and negatives are found in the HPI.  All others are negative.    No fevers noted she can cough is not been productive    Objective:     Physical Exam:  /72  Pulse 86  Temp 98.1  F (36.7  C)  Ht 5' 4\" (1.626 m)  Wt 175 lb (79.4 kg)  SpO2 98%  BMI 30.04 kg/m2  General Appearance: Alert, cooperative, no distress, appears " stated age  Head: Normocephalic, without obvious abnormality, atraumatic  Eyes: PERRL, conjunctiva/corneas clear, EOM's intact  Ears: Normal TM's and external ear canals, both ears  Nose: Nares normal, septum midline,mucosa normal, no drainage  Throat: Posterior pharyngeal drainage white yellow in color  Neck: Supple, symmetrical, trachea midline, no adenopathy;  thyroid: not enlarged, symmetric, no tenderness/mass/nodules; no carotid bruit or JVD  Lungs: Wheezing noted most prominent in right lower base.  Extreme bronchospasm spells with deep breathing.  Heart: Regular rate and rhythm, S1 and S2 normal, no murmur, rub, or gallop,       Extremities: Extremities normal, atraumatic, no cyanosis or edema  Skin: Skin color, texture, turgor normal, no rashes or lesions  Lymph nodes: Mild anterior cervical lymphadenopathy  Neurologic: Intact, no focal deficits   Mental status:  Appropriate, Affect normal/patient in good spirits

## 2021-06-14 NOTE — PROGRESS NOTES
Assessment:    1. Acute sinusitis  azithromycin (ZITHROMAX) 250 MG tablet   2. Moderate persistent asthma with exacerbation     3. Cough  codeine-guaiFENesin (GUAIFENESIN AC)  mg/5 mL liquid   4. Tobacco use disorder           Plan:    Penny as directed for sinusitis management of postnasal drainage with asthma exacerbation.  Guaifenesin with codeine 1-2 teaspoons 4 times daily as needed for cough suppression.  Was to use Symbicort 2 puffs twice daily and consistent basis good days and bad days otherwise albuterol metered-dose inhaler on as-needed basis if shortness of breath or wheeze noted.  Discussed need to quit smoking currently half pack to 1 pack per day however declines intervention at this time and wants to focus on current illness first as well as establishing insurance.        Subjective:    Judy Peterson is seen today for ongoing illness.  Symptoms are past 12 days.  Cough.  Sinus congestion.  Postnasal drainage.  Headache.  Has had sinusitis issues in the past.  History of asthma.  Mild exacerbation currently.  Not using Symbicort however on a consistent basis.  Has guaifenesin with codeine available historically which has been helpful in the past with cough.  Needs refill.  Comprehensive review of systems as above otherwise all negative.  Currently smoking half pack to 1 pack per day.  Not ready to quit    History reviewed. No pertinent surgical history.     History reviewed. No pertinent family history.     History reviewed. No pertinent past medical history.     Social History   Substance Use Topics     Smoking status: Current Every Day Smoker     Packs/day: 1.00     Smokeless tobacco: None     Alcohol use None        Current Outpatient Prescriptions   Medication Sig Dispense Refill     albuterol (VENTOLIN HFA) 90 mcg/actuation inhaler INHALE 2 PUFFS BY MOUTH EVERY 4 TO 6 HOURS AS NEEDED 18 g 1     budesonide-formoterol (SYMBICORT) 160-4.5 mcg/actuation inhaler Inhale 2 puffs 2 (two) times a  day. 1 Inhaler 1     inhalational spacing device (AEROCHAMBER MV) Spcr To use with Symbicort 1 each 0     norelgestromin-ethinyl estradiol (XULANE) 150-35 mcg/24 hr Place 1 patch on the skin once a week. 12 patch 0     valACYclovir (VALTREX) 500 MG tablet TAKE 1 TABLET BY MOUTH EVERY DAY 30 tablet 0     azithromycin (ZITHROMAX) 250 MG tablet Take 2 tabs on day one, and then 1 tab on days 2-5. 6 tablet 0     codeine-guaiFENesin (GUAIFENESIN AC)  mg/5 mL liquid Take 5-10 mL by mouth 4 (four) times a day as needed for cough. 240 mL 0     predniSONE (DELTASONE) 10 mg tablet 4 tabs for 3 days then 2 tabs for 3 days then 1 tab for 3 days then dc 21 tablet 0     No current facility-administered medications for this visit.           Objective:    Vitals:    11/15/17 1106   BP: 118/60   Temp: 98.2  F (36.8  C)   Weight: 177 lb (80.3 kg)      Body mass index is 30.38 kg/(m^2).    Alert.  Mildly ill.  Nontoxic.  Cooperative.  Appears mildly short of breath with activity.  HEENT exam with conjunctival injection.  Nasal congestion.  Scant postnasal drainage.  Oropharynx with moist mucous membranes.  Neck supple.  Cardiac exam without tachycardia.  Chest without significant inspiratory crackle.  Scattered expiratory wheeze on forced expiration only.  Symmetric expansion.  Extremities warm and dry.

## 2021-06-14 NOTE — TELEPHONE ENCOUNTER
RN cannot approve Refill Request    RN can NOT refill this medication med is not covered by policy/route to provider. Last office visit: 9/26/2019 Luz Carrasco CNP Last Physical: Visit date not found Last MTM visit: Visit date not found Last visit same specialty: 9/26/2019 Luz Carrasco CNP.  Next visit within 3 mo: Visit date not found  Next physical within 3 mo: Visit date not found      Dede Alan, [facility] Triage/Med Refill 12/22/2020    Requested Prescriptions   Pending Prescriptions Disp Refills     naproxen (NAPROSYN) 500 MG tablet [Pharmacy Med Name: NAPROXEN 500MG TABS] 60 tablet 0     Sig: TAKE ONE TABLET BY MOUTH TWO TIMES A DAY WITH MEALS       There is no refill protocol information for this order

## 2021-06-16 PROBLEM — J45.40 MODERATE PERSISTENT ASTHMA WITHOUT COMPLICATION: Status: ACTIVE | Noted: 2020-07-31

## 2021-06-16 NOTE — TELEPHONE ENCOUNTER
RN cannot approve Refill Request    RN can NOT refill this medication med is not covered by policy/route to provider. Last office visit: 9/26/2019 Luz Carrasco CNP Last Physical: Visit date not found Last MTM visit: Visit date not found Last visit same specialty: 9/26/2019 Luz Carrasco CNP.  Next visit within 3 mo: Visit date not found  Next physical within 3 mo: Visit date not found      Randi Magana, [facility] Triage/Med Refill 3/26/2021    Requested Prescriptions   Pending Prescriptions Disp Refills     naproxen (NAPROSYN) 500 MG tablet 60 tablet 0     Sig: Take 1 tablet (500 mg total) by mouth 2 (two) times a day with meals.       There is no refill protocol information for this order

## 2021-06-16 NOTE — TELEPHONE ENCOUNTER
RN cannot approve Refill Request    RN can NOT refill this medication med is not covered by policy/route to provider. Last office visit: 9/26/2019 Luz Carrasco CNP Last Physical: Visit date not found Last MTM visit: Visit date not found Last visit same specialty: 9/26/2019 Luz Carrasco CNP.  Next visit within 3 mo: Visit date not found  Next physical within 3 mo: Visit date not found      Jasmin Carson, [facility] Triage/Med Refill 3/29/2021    Requested Prescriptions   Pending Prescriptions Disp Refills     naproxen (NAPROSYN) 500 MG tablet 60 tablet 0     Sig: Take 1 tablet (500 mg total) by mouth 2 (two) times a day with meals. As needed       There is no refill protocol information for this order

## 2021-06-16 NOTE — TELEPHONE ENCOUNTER
Medication pended from fax copy:          Medication: Naproxen 500 MG tabs    Last appointment: Office: 9/26/19; Virtual: 7/31/20     Last 3 blood pressures:  BP Readings from Last 3 Encounters:   09/26/19 116/66   07/12/19 120/70   06/24/19 124/68

## 2021-06-16 NOTE — TELEPHONE ENCOUNTER
RN cannot approve Refill Request    RN can NOT refill this medication PCP messaged that patient is overdue for Labs. Last office visit: Visit date not found Last Physical: Visit date not found Last MTM visit: Visit date not found Last visit same specialty: 9/26/2019 Luz Carrasco CNP.  Next visit within 3 mo: Visit date not found  Next physical within 3 mo: Visit date not found      Jasmin MANDA Carson, [facility] Triage/Med Refill 3/29/2021    Requested Prescriptions   Pending Prescriptions Disp Refills     valACYclovir (VALTREX) 1000 MG tablet 4 tablet 6       Antivirals Refill Protocol Failed - 3/29/2021  3:50 PM        Failed - Renal function done in last year     No results found for: CREATININE, CREATININE          Passed - Visit with PCP or prescribing provider visit in past 12 months or next 3 months     Last office visit with prescriber/PCP: Visit date not found OR same dept: Visit date not found OR same specialty: 9/26/2019 Luz Carrasco CNP  Last physical: Visit date not found Last MTM visit: Visit date not found   Next visit within 3 mo: Visit date not found  Next physical within 3 mo: Visit date not found  Prescriber OR PCP: Caitie Nam MD  Last diagnosis associated with med order: 1. Herpes simplex type 1 infection  - valACYclovir (VALTREX) 1000 MG tablet  Dispense: 4 tablet; Refill: 6    If protocol passes may refill for 12 months if within 3 months of last provider visit (or a total of 15 months).             Passed - Patient does not have active pregnancy episode        Passed - Patient has not had positive pregnancy test in last 280 days     No results found for: HCGQUAL, PREGTESTUR, PREGSERUM, BHCG

## 2021-06-17 NOTE — TELEPHONE ENCOUNTER
RN cannot approve Refill Request    RN can NOT refill this medication PCP messaged that patient is overdue for Labs. Last office visit: 9/26/2019 Luz Carrasco CNP Last Physical: Visit date not found Last MTM visit: Visit date not found Last visit same specialty: 9/26/2019 Luz Carrasco CNP.  Next visit within 3 mo: Visit date not found  Next physical within 3 mo: Visit date not found      Randi Magana, [facility] Triage/Med Refill 4/30/2021    Requested Prescriptions   Pending Prescriptions Disp Refills     valACYclovir (VALTREX) 1000 MG tablet 4 tablet 6     Sig: TAKE 2 TABLETS(2000 MG) BY MOUTH TWICE DAILY       Antivirals Refill Protocol Failed - 4/30/2021  9:50 AM        Failed - Renal function done in last year     No results found for: CREATININE, CREATININE          Passed - Visit with PCP or prescribing provider visit in past 12 months or next 3 months     Last office visit with prescriber/PCP: 9/26/2019 Luz Carrasco CNP OR same dept: Visit date not found OR same specialty: 9/26/2019 Luz Carrasco CNP  Last physical: Visit date not found Last MTM visit: Visit date not found   Next visit within 3 mo: Visit date not found  Next physical within 3 mo: Visit date not found  Prescriber OR PCP: Luz Carrasco CNP  Last diagnosis associated with med order: 1. Herpes simplex type 1 infection  - valACYclovir (VALTREX) 1000 MG tablet; TAKE 2 TABLETS(2000 MG) BY MOUTH TWICE DAILY  Dispense: 4 tablet; Refill: 6    If protocol passes may refill for 12 months if within 3 months of last provider visit (or a total of 15 months).             Passed - Patient does not have active pregnancy episode        Passed - Patient has not had positive pregnancy test in last 280 days     No results found for: HCGQUAL, PREGTESTUR, PREGSERUM, BHCG

## 2021-06-18 ENCOUNTER — OFFICE VISIT - HEALTHEAST (OUTPATIENT)
Dept: FAMILY MEDICINE | Facility: CLINIC | Age: 36
End: 2021-06-18

## 2021-06-18 DIAGNOSIS — J45.40 MODERATE PERSISTENT ASTHMA WITHOUT COMPLICATION: ICD-10-CM

## 2021-06-18 DIAGNOSIS — M25.551 BILATERAL HIP PAIN: ICD-10-CM

## 2021-06-18 DIAGNOSIS — M25.552 BILATERAL HIP PAIN: ICD-10-CM

## 2021-06-18 DIAGNOSIS — F17.200 SMOKING: ICD-10-CM

## 2021-06-18 DIAGNOSIS — E70.1 PHENYLKETONURIA (PKU) (H): ICD-10-CM

## 2021-06-18 DIAGNOSIS — B00.9 HERPES SIMPLEX TYPE 1 INFECTION: ICD-10-CM

## 2021-06-18 RX ORDER — VALACYCLOVIR HYDROCHLORIDE 1 G/1
TABLET, FILM COATED ORAL
Qty: 4 TABLET | Refills: 6 | Status: SHIPPED | OUTPATIENT
Start: 2021-06-18 | End: 2021-11-05

## 2021-06-18 ASSESSMENT — PATIENT HEALTH QUESTIONNAIRE - PHQ9: SUM OF ALL RESPONSES TO PHQ QUESTIONS 1-9: 2

## 2021-06-18 NOTE — PROGRESS NOTES
Assessment and Plan:     1. Allergic conjunctivitis of both eyes  We will treat with Patanol eyedrops.  Educated on its indications and side effects.  Discussed avoidance of rubbing the eyes.  Discussed applying cool compresses for symptomatic treatment.  Recommend over-the-counter cetirizine 10 mg daily.  She can discontinue the Claritin as this is not providing relief.  - olopatadine (PATANOL) 0.1 % ophthalmic solution; Administer 1 drop to both eyes 2 (two) times a day.  Dispense: 5 mL; Refill: 1    2. Herpes simplex type 1 infection  She continues valacyclovir as needed.  She is content with the plan.  - valACYclovir (VALTREX) 1000 MG tablet; Take 2 tablets (2,000 mg total) by mouth 2 (two) times a day.  Dispense: 4 tablet; Refill: 6      Subjective:     Judy is a 33 y.o. female presenting to the clinic for concerns for conjunctivitis.  Patient states over the past 2 weeks, her eyes have been watery and pruritic.  She has noticed that they are red in color.  She has had some crusting of the eyelashes in the morning.  She denies rhinorrhea, postnasal drainage, cough, headache, stomachache, nausea, vomiting, fever.  She denies photophobia, eye pain, foreign body sensation.  She has not changed her environment.  She does have cats and dogs at home.  She has a history of allergy testing in the past which she was told was negative.  Patient requests refill of valacyclovir for cold sores.  She obtains cold sores once every few months.    Review of Systems: A complete 14 point review of systems was obtained and is negative or as stated in the history of present illness.    Social History     Social History     Marital status: Single     Spouse name: N/A     Number of children: N/A     Years of education: N/A     Occupational History     Not on file.     Social History Main Topics     Smoking status: Current Every Day Smoker     Packs/day: 1.00     Smokeless tobacco: Never Used     Alcohol use Not on file     Drug  "use: Not on file     Sexual activity: Not on file     Other Topics Concern     Not on file     Social History Narrative       Active Ambulatory Problems     Diagnosis Date Noted     Phenylketonuria      Anxiety      Sinusitis      Attention-deficit Hyperactivity Disorder      Smoking 10/04/2016     Resolved Ambulatory Problems     Diagnosis Date Noted     Cerumen Impaction In Both Ears      Acute bronchitis      Conjunctivitis      No Additional Past Medical History       No family history on file.    Objective:     /62  Pulse 76  Ht 5' 4\" (1.626 m)  Wt 178 lb 8 oz (81 kg)  BMI 30.64 kg/m2    Patient is alert, in no obvious distress.   Skin: Warm, dry.  No lesions or rashes.  Skin turgor rapid return.   HEENT:  Head normocephalic, atraumatic.  Eyes conjunctiva is injected bilaterally.  PERRL.  EOM's intact.  She is frequently rubbing her eyes.  Ears normal.  Nose patent, mucosa pink.  Oropharynx mucosa pink.  No lesions or tonsillar enlargement.   Neck: Supple, no lymphadenopathy.   Lungs:  Clear to auscultation. Respirations even and unlabored.  No wheezing or rales noted.   Heart:  Regular rate and rhythm.  No murmurs.                "

## 2021-06-19 NOTE — PROGRESS NOTES
Patient ID: Judy Peterson is a 33 y.o. female.  /68  Temp 98.2  F (36.8  C)  Wt 172 lb (78 kg)  BMI 29.52 kg/m2    Assessment/Plan:                   Diagnoses and all orders for this visit:    Conjunctivitis        DISCUSSION  Suspect conjunctivitis suspect that this is more of a chronic allergic type conjunctivitis.  On identifiable triggering factor.  Need to rule out other potential causes.  Low concern for more significant factor such as glaucoma.  Discussed the importance evaluation by eye care specialist with more advanced equipment.  We will treat temporarily with ketotifen eyedrops and lubricating eyedrops until she is seen by ophthalmology.  Subjective:     HPI    Judy Peterson is a 33 y.o. female who is suffered from redness and eye irritation for approximately 6 weeks.  She was initially prescribed Patanol eyedrops with the consideration that this was an allergic type conjunctivitis.  She subsequently returned in early June for reevaluation and she was prescribed TobraDex eyedrops which she used for about a week.  Patient reports significant improvement in her eye irritation and redness after using these drops.  The redness then has gradually worsened back to its previous state.  Patient reports that her vision is sometimes blurry because she feels there is excessive tearing.  She is return to wearing eye makeup.  She does not wear contact lenses.  She does report increased frequency of headaches but not a constant headache.  She has no prior eye history.  Denies any nausea.  She is bothered by the symptoms.  She does not noticed that the symptoms are better or worse in any particular environment.  Symptoms tend to be most bothersome in the morning upon waking.  Patient reports using no medications currently.  She reports it is been about 2-1/2 weeks since he is used any eyedrops, the last eyedrop was TobraDex.    Review of Systems  Complete review of systems is obtained.  Other than the  specific considerations noted above complete review of systems is negative.        Objective:   Medications:  Current Outpatient Prescriptions   Medication Sig     budesonide-formoterol (SYMBICORT) 160-4.5 mcg/actuation inhaler Inhale 2 puffs 2 (two) times a day.     inhalational spacing device (AEROCHAMBER MV) Spcr To use with Symbicort     norelgestromin-ethinyl estradiol (XULANE) 150-35 mcg/24 hr Place 1 patch on the skin once a week.     olopatadine 0.7 % Drop Apply 1 drop to eye daily. Apply 1 drop to each eye once daily     valACYclovir (VALTREX) 1000 MG tablet Take 2 tablets (2,000 mg total) by mouth 2 (two) times a day.     Allergies:  Allergies   Allergen Reactions     Cefprozil Hives     Tobacco:   reports that she has been smoking.  She has been smoking about 1.00 pack per day. She has never used smokeless tobacco.     Physical Exam      /68  Temp 98.2  F (36.8  C)  Wt 172 lb (78 kg)  BMI 29.52 kg/m2      General: Patient is alert no signs of distress    Eyes: Both eyes show conjunctival reddening.  The redness seems to be most concentrated in the middle portion of the eye.  More superiorly and inferiorly the redness in the conjunctiva actually diminishes slightly.  There is no photophobia.  Extraocular metastases are intact.  No evidence of any external cellulitis or skin irritation concerns.  She is able to read the equivalent of newsprint at arm's length distance with both eyes.

## 2021-06-19 NOTE — LETTER
Letter by Luz Carrasco CNP at      Author: Luz Carrasco CNP Service: -- Author Type: --    Filed:  Encounter Date: 9/12/2019 Status: (Other)         Judy Peterson  [address] [place]  [facility] [address]      September 12, 2019      Dear Judy    In reviewing your records, we have determined a gap in your preventive services. Based on your age and health history, we recommend the follow service.     ? Physical with a Pap Smear      If you have had the service elsewhere, please contact us so we can update our records. Please let us know if you have transferred your care to another clinic.    Please call [phone] to schedule this appointment.    We believe that a strong preventive care program, including regular physicals and follow-up care is an important part of a healthy lifestyle and we are committed to helping you maintain your health.    Thank you for choosing us as your health care provider.    Sincerely,     [facility]

## 2021-06-21 NOTE — PROGRESS NOTES
Assessment/Plan:    1. Atopic dermatitis, unspecified type  Atopic dermatitis.  Betamethasone 0.05% cream twice daily to affected areas.  - betamethasone dipropionate (DIPROLENE) 0.05 % cream; apply topically to affected areas twice daily as needed.  Dispense: 45 g; Refill: 1    2. Menstrual migraine without status migrainosus, not intractable  Menstrual migraines described.  Naprosyn 500 mg twice daily as needed starting premenstrual.  - naproxen (NAPROSYN) 500 MG tablet; Take 1 tablet (500 mg total) by mouth 2 (two) times a day with meals.  Dispense: 60 tablet; Refill: 2    3. Encounter for counseling regarding contraception  Contraception counseling reviewed.  Refill on Xulane patch.  Patient to follow-up with Luz Carrasco for physical exam and Pap smear completion within next 90 days with prior Pap smear 2011.  - norelgestromin-ethinyl estradiol (XULANE) 150-35 mcg/24 hr; Place 1 patch on the skin once a week.  Dispense: 12 patch; Refill: 0        Subjective:    Judy Peterson is seen today for several concerns.  Describes worsening eczema lateral aspect of lower legs bilateral.  Scratching areas due to pruritus.  Worse at night when she is trying to sleep.  Also migraine headaches described associated with menstrual cycle.  Has used ibuprofen at times.  Is not tried anything stronger.  Needs refill on Xulane patch for contraception.  Is not been seen for Pap smear since 2011.  Continues to smoke half pack per day.  Past medical social and family history reviewed and updated as noted below.  Contraception counseling completed.    Single  No children  Tobacco: 1/2 ppd  EtOH: none  Mom - RA  Dad -   1 younger sis -   Surgeries: right groin hernia  Hospitalizations:   Work: manager at [employer]    No past surgical history on file.     No family history on file.     No past medical history on file.     Social History     Tobacco Use     Smoking status: Current Every Day Smoker     Packs/day: 1.00     Smokeless tobacco:  "Never Used   Substance Use Topics     Alcohol use: Not on file     Drug use: Not on file        Current Outpatient Medications   Medication Sig Dispense Refill     budesonide-formoterol (SYMBICORT) 160-4.5 mcg/actuation inhaler Inhale 2 puffs 2 (two) times a day. 1 Inhaler 1     norelgestromin-ethinyl estradiol (XULANE) 150-35 mcg/24 hr Place 1 patch on the skin once a week. 12 patch 0     valACYclovir (VALTREX) 1000 MG tablet Take 2 tablets (2,000 mg total) by mouth 2 (two) times a day. 4 tablet 6     betamethasone dipropionate (DIPROLENE) 0.05 % cream apply topically to affected areas twice daily as needed. 45 g 1     naproxen (NAPROSYN) 500 MG tablet Take 1 tablet (500 mg total) by mouth 2 (two) times a day with meals. 60 tablet 2     No current facility-administered medications for this visit.           Objective:    Vitals:    11/23/18 1322   BP: 110/70   Pulse: 86   Weight: 172 lb (78 kg)   Height: 5' 4\" (1.626 m)      Body mass index is 29.52 kg/m .    Alert.  No apparent distress.  Mild psychomotor agitation.  Excoriations lateral aspect of bilateral lower extremities at area of dermatitis.  Mild excoriation only.  Chest clear.  Cardiac exam regular.      This note has been dictated using voice recognition software and as a result may contain minor grammatical errors and unintended word substitutions.   "

## 2021-06-22 NOTE — PROGRESS NOTES
Patient ID: Judy Peterson is a 33 y.o. female.  /74   Wt 169 lb (76.7 kg)   LMP 11/22/2018   BMI 29.01 kg/m      Assessment/Plan:                   Diagnoses and all orders for this visit:    Rash in adult  -     Ambulatory referral to Dermatology    Other orders  -     predniSONE (DELTASONE) 10 mg tablet; 4 tablet daily for 4 days, then 3 tablet daily for 4 days, then 2 tablet daily for 4 day, then 1 tablet daily for 4 days.  Dispense: 40 tablet; Refill: 0  -     hydrOXYzine HCl (ATARAX) 25 MG tablet; Take 1 tablet (25 mg total) by mouth every 8 (eight) hours as needed for itching.  Dispense: 30 tablet; Refill: 0          DISCUSSION  This does not seem to be an acute life-threatening process however it is a diffuse process.  This may be a significant manifestation of an eczematous type process or may represent a contact dermatitis or some other type of allergic response.  We will initiate a prednisone taper and place her on hydroxyzine for the itching.  We will follow-up in the short-term to determine if any more immediate action is necessary.  Patient agrees if there is significant worsening she was seeking more immediate reevaluation in the emergency department.  Based on the significance of this rash we will get her set up to see dermatology for help in sorting out the diagnosis and further treatment considerations.  Subjective:     HPI    Judy Peterson is a 33 y.o. female who is here today concerned regarding a rash covering most of her body.  Patient states this rash began just in the past few days.  The rash is extremely itchy.  Consists of small red bumps.  Red bumps are most prominent on the extremities especially the upper arms.  It is present on the anterior chest but not the abdomen.  It is present along the waistline.  It is present on the lower legs.  It is not present on the hands or on the feet.  There are some areas that seem to be present on the face itself around the eyes and on  the cheeks.  Patient states that this is extremely itchy.  She was given a topical corticosteroid to put on a previous rash but this has not helped for this rash.  Patient is not aware of any new substance which came in contact with her skin.  She denies any shortness of breath or chest pain.  It is noted the patient was seen fairly recently diagnosed with urticaria but she describes this rash as being quite a bit different.    Review of Systems  Complete review of systems is obtained.  Other than the specific considerations noted above complete review of systems is negative.          Objective:   Medications:  Current Outpatient Medications   Medication Sig     budesonide-formoterol (SYMBICORT) 160-4.5 mcg/actuation inhaler Inhale 2 puffs 2 (two) times a day.     naproxen (NAPROSYN) 500 MG tablet Take 1 tablet (500 mg total) by mouth 2 (two) times a day with meals.     norelgestromin-ethinyl estradiol (XULANE) 150-35 mcg/24 hr Place 1 patch on the skin once a week.     valACYclovir (VALTREX) 1000 MG tablet Take 2 tablets (2,000 mg total) by mouth 2 (two) times a day.     betamethasone dipropionate (DIPROLENE) 0.05 % cream apply topically to affected areas twice daily as needed.     hydrOXYzine HCl (ATARAX) 25 MG tablet Take 1 tablet (25 mg total) by mouth every 8 (eight) hours as needed for itching.     predniSONE (DELTASONE) 10 mg tablet 4 tablet daily for 4 days, then 3 tablet daily for 4 days, then 2 tablet daily for 4 day, then 1 tablet daily for 4 days.       Allergies:  Allergies   Allergen Reactions     Cefprozil Hives       Tobacco:   reports that she has been smoking.  She has been smoking about 1.00 pack per day. she has never used smokeless tobacco.     Physical Exam          /74   Wt 169 lb (76.7 kg)   LMP 11/22/2018   BMI 29.01 kg/m       Patient is alert there is no sign of distress.  Patient is itchy and uncomfortable appearing    The papules seem to be clustered in the antecubital fossa and  on the lower legs.  They are present along the waistline as well and the upper chest and to some extent the abdomen.  It is difficult to tell for certain if the same rash is present on the face but there seems to be a few small red papules consistent with the more diffuse rash also present on the facial area.  It is not present on the feet.  There are no signs of any significant excoriations or signs of secondary infection.  No blisterlike lesions or skin breakdown in general.

## 2021-06-22 NOTE — PROGRESS NOTES
Assessment/Plan:     1. Urticaria         Diagnoses and all orders for this visit:    Urticaria    I suspect that she has had a reaction to the makeup that she applied this morning.  At this time I recommend she discontinue use of this makeup.  Encouraged her to start antihistamine such as Claritin or Zyrtec.  Recommend application of topical over-the-counter hydrocortisone cream as well as ice packs.  Contact us if she is not noticing improvement by tomorrow.  Also recommend use of bland moisturizing cream such as Cetaphil or CeraVe and bland face wash such as Cetaphil, Dove or CeraVe.         Subjective:      Judy Peterson is a 33 y.o. female who comes in today with concern about redness and swelling of the left cheek area.  She has a history of eczema and was actually seen in clinic last week for a flareup of eczema on her leg.  States that the current rash is unrelated and not like her usual eczema.  She is unsure if she has had an allergic reaction to some new makeup that she purchase.  She did notice that the skin of her left upper cheek underneath the eye was a little bit dry and itchy yesterday.  She woke up this morning and applied makeup as usual.  Used a new makeup that she has never used before.  Went to work and a friend commented that her face was red and swollen.  Looked in the mirror and saw the swelling and redness underneath the left eye.  She immediately contacted the clinic to schedule an appointment.  She is concerned about getting this cleared up before she plans to travel to [place] on Saturday afternoon.  States that the redness and swelling is staying about the same.  It has not worsened or improved.  She has not taking any medication such as Benadryl.  She has not applied any topical creams or lotions.  She has not used any ice packs.  She is otherwise feeling well.  She is not vision changes.  Does have a little bit of redness on the right upper cheek as well.  She has no other  concerns or questions.  Reviewed medications and allergies and updated the chart.    Current Outpatient Medications   Medication Sig Dispense Refill     betamethasone dipropionate (DIPROLENE) 0.05 % cream apply topically to affected areas twice daily as needed. 45 g 1     budesonide-formoterol (SYMBICORT) 160-4.5 mcg/actuation inhaler Inhale 2 puffs 2 (two) times a day. 1 Inhaler 1     naproxen (NAPROSYN) 500 MG tablet Take 1 tablet (500 mg total) by mouth 2 (two) times a day with meals. 60 tablet 2     norelgestromin-ethinyl estradiol (XULANE) 150-35 mcg/24 hr Place 1 patch on the skin once a week. 12 patch 0     valACYclovir (VALTREX) 1000 MG tablet Take 2 tablets (2,000 mg total) by mouth 2 (two) times a day. 4 tablet 6     No current facility-administered medications for this visit.        Past Medical History, Family History, and Social History reviewed.  No past medical history on file.  No past surgical history on file.  Cefprozil  No family history on file.  Social History     Socioeconomic History     Marital status: Single     Spouse name: Not on file     Number of children: Not on file     Years of education: Not on file     Highest education level: Not on file   Social Needs     Financial resource strain: Not on file     Food insecurity - worry: Not on file     Food insecurity - inability: Not on file     Transportation needs - medical: Not on file     Transportation needs - non-medical: Not on file   Occupational History     Not on file   Tobacco Use     Smoking status: Current Every Day Smoker     Packs/day: 1.00     Smokeless tobacco: Never Used   Substance and Sexual Activity     Alcohol use: No     Frequency: Never     Drug use: No     Sexual activity: Not on file   Other Topics Concern     Not on file   Social History Narrative     Not on file         Review of systems is as stated in HPI, and the remainder of the 10 system review is otherwise negative.    Objective:     Vitals:    11/29/18 1332    BP: 110/68   Patient Site: Right Arm   Patient Position: Sitting   Cuff Size: Adult Large   Pulse: 86   Temp: 98.1  F (36.7  C)   TempSrc: Other   SpO2: 98%   Weight: 169 lb 3 oz (76.7 kg)    Body mass index is 29.04 kg/m .    General appearance: alert, appears stated age and cooperative  Head: Normocephalic, without obvious abnormality, atraumatic  Skin: Raised erythematous patches of skin present left upper cheek and right upper cheek consistent with urticaria        This note has been dictated using voice recognition software. Any grammatical or context distortions are unintentional and inherent to the the software.

## 2021-06-23 NOTE — PROGRESS NOTES
Assessment and Plan:     1. Moderate persistent asthma with exacerbation  We will treat with prednisone 20 mg twice daily for 5 days.  Educated on its indications and side effects.  Discussed importance of using Symbicort daily.  She will continue albuterol as needed.  - predniSONE (DELTASONE) 20 MG tablet; Take 20 mg by mouth 2 (two) times a day for 5 days.  Dispense: 10 tablet; Refill: 0  - albuterol (PROAIR HFA;PROVENTIL HFA;VENTOLIN HFA) 90 mcg/actuation inhaler; Inhale 2 puffs every 4 (four) hours as needed for wheezing or shortness of breath.  Dispense: 1 each; Refill: 0  - budesonide-formoterol (SYMBICORT) 160-4.5 mcg/actuation inhaler; Inhale 2 puffs 2 (two) times a day.  Dispense: 1 Inhaler; Refill: 1    2. Upper respiratory tract infection, unspecified type  Discussed symptomatic treatment of viral symptoms.  Will treat with guaifenesin with codeine as needed.  She is to avoid taking this with other sedatives.  We did discuss that this is addictive and habit-forming.  If fever develops or cough worsens, suggest follow-up.  - codeine-guaiFENesin (GUAIFENESIN AC)  mg/5 mL liquid; Take 5-10 mL by mouth 3 (three) times a day as needed for cough.  Dispense: 118 mL; Refill: 0    3. Herpes simplex type 1 infection  She continues valacyclovir as needed.  - valACYclovir (VALTREX) 1000 MG tablet; Take 2 tablets (2,000 mg total) by mouth 2 (two) times a day.  Dispense: 4 tablet; Refill: 6    4. Smoking  Offered smoking cessation options, but she declines.  She is not ready to quit smoking.  She will follow-up if symptoms persist or worsen.  I have counseled the patient for tobacco cessation and the follow up will occur  at the next visit.    She is due for a physical.     Subjective:     Judy is a 33 y.o. female presenting to the clinic for concerns for cold symptoms for 7 days.  Patient has been diagnosed with asthma in the past.  She does not use her Symbicort every day.  She has not been using her  albuterol inhaler.  She complains of postnasal drainage and a nonproductive cough.  She has had an intermittent headache, shortness of breath, chest tightness, wheezing.  She denies stomachache, nausea, vomiting, fever.  She had a sore throat at the onset of symptoms but none since.  She has been taking over-the-counter Robitussin-DM.  No one else around her is ill.  She does smoke 1/2-1 pack/day. She is not ready to quit smoking.   Patient also requests refill of valacyclovir.  She obtains cold sores 1-2 times per month.  She has found that valacyclovir shortens the duration of symptoms.    Review of Systems: A complete 14 point review of systems was obtained and is negative or as stated in the history of present illness.    Social History     Socioeconomic History     Marital status: Single     Spouse name: Not on file     Number of children: Not on file     Years of education: Not on file     Highest education level: Not on file   Social Needs     Financial resource strain: Not on file     Food insecurity - worry: Not on file     Food insecurity - inability: Not on file     Transportation needs - medical: Not on file     Transportation needs - non-medical: Not on file   Occupational History     Not on file   Tobacco Use     Smoking status: Current Every Day Smoker     Packs/day: 1.00     Smokeless tobacco: Never Used   Substance and Sexual Activity     Alcohol use: No     Frequency: Never     Drug use: No     Sexual activity: Not on file   Other Topics Concern     Not on file   Social History Narrative     Not on file       Active Ambulatory Problems     Diagnosis Date Noted     Phenylketonuria      Anxiety      Sinusitis      Attention-deficit Hyperactivity Disorder      Smoking 10/04/2016     Resolved Ambulatory Problems     Diagnosis Date Noted     Cerumen Impaction In Both Ears      Acute bronchitis      Conjunctivitis      No Additional Past Medical History       No family history on file.    Objective:  "    /68   Pulse 78   Temp 98.4  F (36.9  C)   Ht 5' 4\" (1.626 m)   Wt 180 lb (81.6 kg)   SpO2 98%   BMI 30.90 kg/m      Patient is alert, in no obvious distress.   Skin: Warm, dry.  No lesions or rashes.  Skin turgor rapid return.   HEENT:  Head normocephalic, atraumatic.  Eyes normal.  Ears normal.  Nose patent, mucosa red.  Oropharynx mucosa pink.  No lesions or tonsillar enlargement.   Neck: Supple, no lymphadenopathy.   Lungs:  Clear to auscultation. Respirations even and unlabored.  No wheezing or rales noted.   Heart:  Regular rate and rhythm.  No murmurs              "

## 2021-06-23 NOTE — PROGRESS NOTES
Assessment and Plan:     1. Acute bronchitis, unspecified organism  Due to smoking and worsening symptoms, will treat with Z-pack, TAD.  Educated on its indications and side effects. She continues Symbicort daily and Albuterol as needed.   - azithromycin (ZITHROMAX) 250 MG tablet; Take 2 tablets on day 1, then 1 tablet for 4 days.  Dispense: 6 tablet; Refill: 0    2. Smoking  Discussed smoking cessation options, but she declines.   I have counseled the patient for tobacco cessation and the follow up will occur  at the next visit.    3. Phenylketonuria (PKU) (H)  She follows a strict diet.  She has no concerns.  She is content with the plan.         Subjective:     Judy is a 33 y.o. female presenting to the clinic for concerns for ongoing cold symptoms. She has a past medical history of ADHD, Anxiety and PKU.  Patient has had cold symptoms for 2 weeks.  Patient complains of sinus congestion, facial pain and pressure, postnasal drainage, productive cough.  She has vomited due to the cough.  No fever has been present.  She has had shortness of breath, chest tightness, wheezing.  She has been primarily using her Symbicort.  She was seen on 1/25/19 where prednisone was prescribed.  She did take this and found minimal relief.  She feels as though her symptoms are worsening.  She has been taking over-the-counter [place].  No one else around her is ill.    Review of Systems: A complete 14 point review of systems was obtained and is negative or as stated in the history of present illness.    Social History     Socioeconomic History     Marital status: Single     Spouse name: Not on file     Number of children: Not on file     Years of education: Not on file     Highest education level: Not on file   Social Needs     Financial resource strain: Not on file     Food insecurity - worry: Not on file     Food insecurity - inability: Not on file     Transportation needs - medical: Not on file     Transportation needs -  "non-medical: Not on file   Occupational History     Not on file   Tobacco Use     Smoking status: Current Every Day Smoker     Packs/day: 1.00     Smokeless tobacco: Never Used   Substance and Sexual Activity     Alcohol use: No     Frequency: Never     Drug use: No     Sexual activity: Not on file   Other Topics Concern     Not on file   Social History Narrative     Not on file       Active Ambulatory Problems     Diagnosis Date Noted     Phenylketonuria      Anxiety      Sinusitis      Attention-deficit Hyperactivity Disorder      Smoking 10/04/2016     Resolved Ambulatory Problems     Diagnosis Date Noted     Cerumen Impaction In Both Ears      Acute bronchitis      Conjunctivitis      No Additional Past Medical History       No family history on file.    Objective:     /66   Pulse 88   Temp 97.6  F (36.4  C)   Ht 5' 4\" (1.626 m)   Wt 185 lb (83.9 kg)   SpO2 100%   BMI 31.76 kg/m      Patient is alert, in no obvious distress.   Skin: Warm, dry.  No lesions or rashes.  Skin turgor rapid return.   HEENT:  Head normocephalic, atraumatic.  Eyes normal. Ears normal.  Nose patent, mucosa red.  Oropharynx mucosa pink.  No lesions or tonsillar enlargement.   Neck: Supple, no lymphadenopathy.   Lungs:  Clear to auscultation. Respirations even and unlabored.  No wheezing or rales noted.   Heart:  Regular rate and rhythm.  No murmurs.                 "

## 2021-06-25 NOTE — TELEPHONE ENCOUNTER
RN cannot approve Refill Request    RN can NOT refill this medication PCP messaged that patient is overdue for Labs. Last office visit: 9/26/2019 Luz Carrasco CNP Last Physical: Visit date not found Last MTM visit: Visit date not found Last visit same specialty: 9/26/2019 Luz Carrasco CNP.  Next visit within 3 mo: Visit date not found  Next physical within 3 mo: Visit date not found      Dede Alan, [facility] Triage/Med Refill 6/8/2021    Requested Prescriptions   Pending Prescriptions Disp Refills     valACYclovir (VALTREX) 1000 MG tablet 4 tablet 1     Sig: TAKE 2 TABLETS(2000 MG) BY MOUTH TWICE DAILY       Antivirals Refill Protocol Failed - 6/8/2021  7:39 AM        Failed - Renal function done in last year     No results found for: CREATININE, CREATININE          Passed - Visit with PCP or prescribing provider visit in past 12 months or next 3 months     Last office visit with prescriber/PCP: 9/26/2019 Luz Carrasco CNP OR same dept: Visit date not found OR same specialty: 9/26/2019 Luz Carrasco CNP  Last physical: Visit date not found Last MTM visit: Visit date not found   Next visit within 3 mo: Visit date not found  Next physical within 3 mo: Visit date not found  Prescriber OR PCP: Luz Carrasco CNP  Last diagnosis associated with med order: 1. Herpes simplex type 1 infection  - valACYclovir (VALTREX) 1000 MG tablet; TAKE 2 TABLETS(2000 MG) BY MOUTH TWICE DAILY  Dispense: 4 tablet; Refill: 1    If protocol passes may refill for 12 months if within 3 months of last provider visit (or a total of 15 months).             Passed - Patient does not have active pregnancy episode        Passed - Patient has not had positive pregnancy test in last 280 days     No results found for: HCGQUAL, PREGTESTUR, PREGSERUM, BHCG

## 2021-06-25 NOTE — TELEPHONE ENCOUNTER
Refill Approved    Rx renewed per Medication Renewal Policy. Medication was last renewed on 11/23/18.    Kyleigh Hay, [facility] Triage/Med Refill 3/21/2019     Requested Prescriptions   Pending Prescriptions Disp Refills     XULANE 150-35 mcg/24 hr [Pharmacy Med Name: XULANE PATCHES] 12 patch 0     Sig: APPLY 1 PATCH EXTERNALLY TO THE SKIN 1 TIME A WEEK    Oral Contraceptives Protocol Passed - 3/19/2019  8:47 AM       Passed - Visit with PCP or prescribing provider visit in last 12 months     Last office visit with prescriber/PCP: 11/23/2018 Abdulkadir Shoemaker MD OR same dept: 1/31/2019 Luz Carrasco CNP OR same specialty: 1/31/2019 Luz Carrasco CNP  Last physical: Visit date not found Last MTM visit: Visit date not found   Next visit within 3 mo: Visit date not found  Next physical within 3 mo: Visit date not found  Prescriber OR PCP: Abdulkadir Shoemaker MD  Last diagnosis associated with med order: 1. Contraception  - XULANE 150-35 mcg/24 hr [Pharmacy Med Name: XULANE PATCHES]; APPLY 1 PATCH EXTERNALLY TO THE SKIN 1 TIME A WEEK  Dispense: 12 patch; Refill: 0    If protocol passes may refill for 12 months if within 3 months of last provider visit (or a total of 15 months).

## 2021-06-25 NOTE — TELEPHONE ENCOUNTER
RN cannot approve Refill Request    RN can NOT refill this medication PCP messaged that patient is overdue for Labs. Last office visit: 9/26/2019 Luz Carrasco CNP Last Physical: Visit date not found Last MTM visit: Visit date not found Last visit same specialty: 9/26/2019 Luz Carrasco CNP.  Next visit within 3 mo: Visit date not found  Next physical within 3 mo: Visit date not found      Randi Magana, [facility] Triage/Med Refill 6/1/2021    Requested Prescriptions   Pending Prescriptions Disp Refills     valACYclovir (VALTREX) 1000 MG tablet 4 tablet 1     Sig: TAKE 2 TABLETS(2000 MG) BY MOUTH TWICE DAILY       Antivirals Refill Protocol Failed - 6/1/2021  9:41 AM        Failed - Renal function done in last year     No results found for: CREATININE, CREATININE          Passed - Visit with PCP or prescribing provider visit in past 12 months or next 3 months     Last office visit with prescriber/PCP: 9/26/2019 Luz Carrasco CNP OR same dept: Visit date not found OR same specialty: 9/26/2019 Luz Carrasco CNP  Last physical: Visit date not found Last MTM visit: Visit date not found   Next visit within 3 mo: Visit date not found  Next physical within 3 mo: Visit date not found  Prescriber OR PCP: Luz Carrasco CNP  Last diagnosis associated with med order: 1. Herpes simplex type 1 infection  - valACYclovir (VALTREX) 1000 MG tablet; TAKE 2 TABLETS(2000 MG) BY MOUTH TWICE DAILY  Dispense: 4 tablet; Refill: 1    If protocol passes may refill for 12 months if within 3 months of last provider visit (or a total of 15 months).             Passed - Patient does not have active pregnancy episode        Passed - Patient has not had positive pregnancy test in last 280 days     No results found for: HCGQUAL, PREGTESTUR, PREGSERUM, BHCG

## 2021-06-25 NOTE — TELEPHONE ENCOUNTER
Please see if she is willing to complete a phone visit as it has been a year since seeing her.  Thanks.

## 2021-06-26 NOTE — PROGRESS NOTES
Judy Peterson is a 36 y.o. female who is being evaluated via a billable telephone visit.      What phone number would you like to be contacted at? [phone]  How would you like to obtain your AVS? AVS Preference: MyChart.    1. Herpes simplex type 1 infection  I discussed that the way that she is taking valacyclovir is not preventing her from obtaining cold sores.  She is likely not having many herpes outbreaks.  Patient does not want to take valacyclovir on a daily basis for prevention.  Discussed taking valacyclovir as needed.  - valACYclovir (VALTREX) 1000 MG tablet; TAKE 2 TABLETS(2000 MG) BY MOUTH TWICE DAILY  Dispense: 4 tablet; Refill: 6    2. Phenylketonuria (PKU) (H)  This is diet-controlled.    3. Bilateral hip pain  Patient does not currently have health insurance.  She continues naproxen as needed.  We discussed that long-term use of naproxen can lead to kidney disease and gastritis.  She is to take this with food.  When she has health insurance, will refer to orthopedics.    4. Moderate persistent asthma without complication  5.  Smoking  Discussed that Symbicort is meant to be used daily and albuterol is to be used as needed for rescue inhaler.  I encouraged her to use her Symbicort daily to see if this improves her asthma symptoms.  Discussed smoking cessation options, but she declines.  She is content with the plan.        Subjective   Judy Peterson is 36 y.o. and presents today for the following health issues   HPI   Patient presents for medication management.  Patient has been taking valacyclovir once per week to attempt to prevent cold sores.  Patient had a recent outbreak of a cold sore within the past month, but states she had not had an outbreak for the past year.  Patient has asthma and continues to smoke.  She is using Symbicort as needed.  She complains of shortness of breath with activity and worsening cough.  Patient has a history of bilateral hip pain and lower extremity pain  over the past year. She is working at [employer] and states that her pain worsens when she stands for prolonged periods of time.  Her right hip pain has worsened.  She takes naproxen as needed. She has phenylketonuria which is diet controlled.       Review of Systems  As noted above, otherwise negative.       Objective       Vitals:  No vitals were obtained today due to virtual visit.    Physical Exam  Patient is alert and is speaking clearly.  She does cough intermittently throughout the visit.  She  does not sound short of breath.          Phone call duration: 16 minutes

## 2021-07-06 ASSESSMENT — PATIENT HEALTH QUESTIONNAIRE - PHQ9: SUM OF ALL RESPONSES TO PHQ QUESTIONS 1-9: 2

## 2021-07-08 ASSESSMENT — ASTHMA QUESTIONNAIRES: ACT_TOTALSCORE: 16

## 2021-07-17 ENCOUNTER — HEALTH MAINTENANCE LETTER (OUTPATIENT)
Age: 36
End: 2021-07-17

## 2021-09-11 ENCOUNTER — HEALTH MAINTENANCE LETTER (OUTPATIENT)
Age: 36
End: 2021-09-11

## 2021-11-05 ENCOUNTER — OFFICE VISIT (OUTPATIENT)
Dept: FAMILY MEDICINE | Facility: CLINIC | Age: 36
End: 2021-11-05
Payer: MEDICAID

## 2021-11-05 VITALS
HEART RATE: 84 BPM | HEIGHT: 64 IN | WEIGHT: 182.5 LBS | SYSTOLIC BLOOD PRESSURE: 120 MMHG | BODY MASS INDEX: 31.16 KG/M2 | OXYGEN SATURATION: 99 % | DIASTOLIC BLOOD PRESSURE: 72 MMHG

## 2021-11-05 DIAGNOSIS — Z13.220 LIPID SCREENING: ICD-10-CM

## 2021-11-05 DIAGNOSIS — Z11.59 NEED FOR HEPATITIS C SCREENING TEST: ICD-10-CM

## 2021-11-05 DIAGNOSIS — M25.50 POLYARTHRALGIA: ICD-10-CM

## 2021-11-05 DIAGNOSIS — E70.1 PHENYLKETONURIA (PKU) (H): ICD-10-CM

## 2021-11-05 DIAGNOSIS — Z11.4 SCREENING FOR HIV (HUMAN IMMUNODEFICIENCY VIRUS): ICD-10-CM

## 2021-11-05 DIAGNOSIS — B00.9 HERPES SIMPLEX TYPE 1 INFECTION: ICD-10-CM

## 2021-11-05 DIAGNOSIS — Z00.00 ENCOUNTER FOR ROUTINE HISTORY AND PHYSICAL EXAM IN FEMALE: Primary | ICD-10-CM

## 2021-11-05 DIAGNOSIS — J45.41 MODERATE PERSISTENT ASTHMA WITH EXACERBATION: ICD-10-CM

## 2021-11-05 DIAGNOSIS — R53.83 OTHER FATIGUE: ICD-10-CM

## 2021-11-05 LAB
ALBUMIN SERPL-MCNC: 4.6 G/DL (ref 3.5–5)
ALP SERPL-CCNC: 77 U/L (ref 45–120)
ALT SERPL W P-5'-P-CCNC: 19 U/L (ref 0–45)
ANION GAP SERPL CALCULATED.3IONS-SCNC: 10 MMOL/L (ref 5–18)
AST SERPL W P-5'-P-CCNC: 16 U/L (ref 0–40)
BILIRUB SERPL-MCNC: 0.6 MG/DL (ref 0–1)
BUN SERPL-MCNC: 6 MG/DL (ref 8–22)
CALCIUM SERPL-MCNC: 10.6 MG/DL (ref 8.5–10.5)
CHLORIDE BLD-SCNC: 109 MMOL/L (ref 98–107)
CHOLEST SERPL-MCNC: 133 MG/DL
CO2 SERPL-SCNC: 20 MMOL/L (ref 22–31)
CREAT SERPL-MCNC: 0.73 MG/DL (ref 0.6–1.1)
ERYTHROCYTE [DISTWIDTH] IN BLOOD BY AUTOMATED COUNT: 11.3 % (ref 10–15)
ERYTHROCYTE [SEDIMENTATION RATE] IN BLOOD BY WESTERGREN METHOD: 6 MM/HR (ref 0–20)
FASTING STATUS PATIENT QL REPORTED: YES
GFR SERPL CREATININE-BSD FRML MDRD: >90 ML/MIN/1.73M2
GLUCOSE BLD-MCNC: 77 MG/DL (ref 70–125)
HCT VFR BLD AUTO: 41.8 % (ref 35–47)
HDLC SERPL-MCNC: 32 MG/DL
HGB BLD-MCNC: 13.9 G/DL (ref 11.7–15.7)
HIV 1+2 AB+HIV1 P24 AG SERPL QL IA: NEGATIVE
LDLC SERPL CALC-MCNC: 78 MG/DL
MCH RBC QN AUTO: 32 PG (ref 26.5–33)
MCHC RBC AUTO-ENTMCNC: 33.3 G/DL (ref 31.5–36.5)
MCV RBC AUTO: 96 FL (ref 78–100)
PLATELET # BLD AUTO: 327 10E3/UL (ref 150–450)
POTASSIUM BLD-SCNC: 4.3 MMOL/L (ref 3.5–5)
PROT SERPL-MCNC: 7 G/DL (ref 6–8)
RBC # BLD AUTO: 4.34 10E6/UL (ref 3.8–5.2)
RHEUMATOID FACT SER NEPH-ACNC: 31 IU/ML (ref 0–30)
SODIUM SERPL-SCNC: 139 MMOL/L (ref 136–145)
TRIGL SERPL-MCNC: 114 MG/DL
TSH SERPL DL<=0.005 MIU/L-ACNC: 0.96 UIU/ML (ref 0.3–5)
VIT B12 SERPL-MCNC: 157 PG/ML (ref 213–816)
WBC # BLD AUTO: 9.4 10E3/UL (ref 4–11)

## 2021-11-05 PROCEDURE — 99214 OFFICE O/P EST MOD 30 MIN: CPT | Mod: 25 | Performed by: NURSE PRACTITIONER

## 2021-11-05 PROCEDURE — 87389 HIV-1 AG W/HIV-1&-2 AB AG IA: CPT | Performed by: NURSE PRACTITIONER

## 2021-11-05 PROCEDURE — 36415 COLL VENOUS BLD VENIPUNCTURE: CPT | Performed by: NURSE PRACTITIONER

## 2021-11-05 PROCEDURE — 86803 HEPATITIS C AB TEST: CPT | Performed by: NURSE PRACTITIONER

## 2021-11-05 PROCEDURE — 86618 LYME DISEASE ANTIBODY: CPT | Performed by: NURSE PRACTITIONER

## 2021-11-05 PROCEDURE — 80061 LIPID PANEL: CPT | Performed by: NURSE PRACTITIONER

## 2021-11-05 PROCEDURE — 86038 ANTINUCLEAR ANTIBODIES: CPT | Performed by: NURSE PRACTITIONER

## 2021-11-05 PROCEDURE — 85652 RBC SED RATE AUTOMATED: CPT | Performed by: NURSE PRACTITIONER

## 2021-11-05 PROCEDURE — G0123 SCREEN CERV/VAG THIN LAYER: HCPCS | Performed by: NURSE PRACTITIONER

## 2021-11-05 PROCEDURE — 99395 PREV VISIT EST AGE 18-39: CPT | Performed by: NURSE PRACTITIONER

## 2021-11-05 PROCEDURE — 87624 HPV HI-RISK TYP POOLED RSLT: CPT | Performed by: NURSE PRACTITIONER

## 2021-11-05 PROCEDURE — 85027 COMPLETE CBC AUTOMATED: CPT | Performed by: NURSE PRACTITIONER

## 2021-11-05 PROCEDURE — 82607 VITAMIN B-12: CPT | Performed by: NURSE PRACTITIONER

## 2021-11-05 PROCEDURE — 84443 ASSAY THYROID STIM HORMONE: CPT | Performed by: NURSE PRACTITIONER

## 2021-11-05 PROCEDURE — 80053 COMPREHEN METABOLIC PANEL: CPT | Performed by: NURSE PRACTITIONER

## 2021-11-05 PROCEDURE — 86431 RHEUMATOID FACTOR QUANT: CPT | Performed by: NURSE PRACTITIONER

## 2021-11-05 RX ORDER — VALACYCLOVIR HYDROCHLORIDE 1 G/1
TABLET, FILM COATED ORAL
Qty: 4 TABLET | Refills: 11 | Status: SHIPPED | OUTPATIENT
Start: 2021-11-05 | End: 2022-11-14

## 2021-11-05 RX ORDER — MONTELUKAST SODIUM 10 MG/1
10 TABLET ORAL AT BEDTIME
Qty: 90 TABLET | Refills: 3 | Status: SHIPPED | OUTPATIENT
Start: 2021-11-05 | End: 2023-01-06

## 2021-11-05 RX ORDER — ALBUTEROL SULFATE 90 UG/1
2 AEROSOL, METERED RESPIRATORY (INHALATION) EVERY 4 HOURS PRN
Qty: 18 G | Refills: 1 | Status: SHIPPED | OUTPATIENT
Start: 2021-11-05 | End: 2024-01-08

## 2021-11-05 RX ORDER — BUDESONIDE AND FORMOTEROL FUMARATE DIHYDRATE 160; 4.5 UG/1; UG/1
2 AEROSOL RESPIRATORY (INHALATION) 2 TIMES DAILY
Qty: 10.2 G | Refills: 11 | Status: SHIPPED | OUTPATIENT
Start: 2021-11-05 | End: 2023-01-06

## 2021-11-05 ASSESSMENT — MIFFLIN-ST. JEOR: SCORE: 1506.78

## 2021-11-05 ASSESSMENT — ACTIVITIES OF DAILY LIVING (ADL): CURRENT_FUNCTION: NO ASSISTANCE NEEDED

## 2021-11-05 NOTE — PROGRESS NOTES
Assessment and Plan:    Encounter for routine history and physical exam in female  Recommend consuming a healthy diet and exercising.  She declines Covid, pneumococcal, tetanus, influenza vaccines.  I encouraged smoking cessation.  - Pap screen with HPV - recommended age 30 - 65 years  - CBC with platelets  - Comprehensive metabolic panel (BMP + Alb, Alk Phos, ALT, AST, Total. Bili, TP)  - CBC with platelets  - Comprehensive metabolic panel (BMP + Alb, Alk Phos, ALT, AST, Total. Bili, TP)    Lipid screening  - Lipid panel reflex to direct LDL Fasting  - Lipid panel reflex to direct LDL Fasting    Screening for HIV (human immunodeficiency virus)  - HIV Antigen Antibody Combo  - HIV Antigen Antibody Combo    Need for hepatitis C screening test  - Hepatitis C antibody  - Hepatitis C antibody    Phenylketonuria  Referred to endocrinology for further evaluation.  - Adult Endocrinology Referral    Herpes simplex type 1 infection  She continues valacyclovir as needed.  - valACYclovir (VALTREX) 1000 mg tablet  Dispense: 4 tablet; Refill: 11    Moderate persistent asthma with exacerbation  This is uncontrolled.  I encouraged her to use her Symbicort daily.  She continues albuterol as needed.  Will start montelukast 10 mg nightly.  Educated on indications and side effects.  Will refer to asthma specialist for further evaluation.  I encourage smoking cessation.  - budesonide-formoterol (SYMBICORT) 160-4.5 MCG/ACT Inhaler  Dispense: 10.2 g; Refill: 11  - albuterol (PROAIR HFA/PROVENTIL HFA/VENTOLIN HFA) 108 (90 Base) MCG/ACT inhaler  Dispense: 18 g; Refill: 1  - montelukast (SINGULAIR) 10 MG tablet  Dispense: 90 tablet; Refill: 3  - Adult Allergy/Asthma Referral    Other fatigue  Polyarthralgia  We will rule out thyroid disease, vitamin deficiencies, autoimmune diseases, Lyme's.  Further plans pending the results.  She is content with the plan.  - Vitamin B12  - TSH with free T4 reflex  - Erythrocyte sedimentation rate auto  -  "Rheumatoid factor  - Anti Nuclear Disha IgG by IFA with Reflex  - Lyme Disease Disha with reflex to WB Serum  - Vitamin B12  - TSH with free T4 reflex  - Erythrocyte sedimentation rate auto  - Rheumatoid factor  - Anti Nuclear Disha IgG by IFA with Reflex  - Lyme Disease Disha with reflex to WB Serum      Subjective:     Judy is a 36 year old female presenting to the clinic for a female physical.     LMP: 2 weeks ago, intermittent spotting with nexplanon   Hx of abnormal pap smear: none   Last pap smear: 2011 normal, negative HPV   Perform self-breast exams: yes   Vaginal discharge or irritation: none   Sexually active: yes, in a relationship for 21 years   Contraception: nexplanon   Concerns for STDs: none   Previous pregnancies:none       Answers for HPI/ROS submitted by the patient on 11/5/2021  In general, how would you rate your overall physical health?: excellent  Frequency of exercise:: None  Do you usually eat at least 4 servings of fruit and vegetables a day, include whole grains & fiber, and avoid regularly eating high fat or \"junk\" foods? : No  Taking medications regularly:: Yes  Medication side effects:: None  Activities of Daily Living: no assistance needed  Home safety: no safety concerns identified  Hearing Impairment:: no hearing concerns  In the past 6 months, have you been bothered by leaking of urine?: No  In general, how would you rate your overall mental or emotional health?: excellent  Additional concerns today:: No    Patient has PKU.  She does not follow a diet.  She has not seen Endocrinology since adolescence.      Patient has persistent asthma.  She feels as her asthma has worsened recently.  She feels wheezing at bedtime.  She uses her Symbicort inconsistently.  She uses her albuterol nightly.  She does continue to smoke 1/2 pack/day.    Patient states she has had intermittent joint pain for the past 2 years.  Most recently, she has had difficulty opening a bottle.  She stands on her feet all " day at work and states she can barely walk due to joint pain within her hips, knees, ankles.  Typically, the pain will last for a day.  She has felt fatigued.  She has been taking ibuprofen as needed for the pain.  She denies history of tick bites.  Her mother has rheumatoid arthritis.    Review of systems:  I performed a 10 point review of systems.  All pertinent positives and negatives are noted in the HPI. All others are negative.     Allergies   Allergen Reactions     Cefprozil Hives       Current Outpatient Medications   Medication     albuterol (PROAIR HFA/PROVENTIL HFA/VENTOLIN HFA) 108 (90 Base) MCG/ACT inhaler     budesonide-formoterol (SYMBICORT) 160-4.5 MCG/ACT Inhaler     etonogestrel (NEXPLANON) 68 MG IMPL     montelukast (SINGULAIR) 10 MG tablet     valACYclovir (VALTREX) 1000 mg tablet     No current facility-administered medications for this visit.       Social History     Socioeconomic History     Marital status: Single     Spouse name: Not on file     Number of children: Not on file     Years of education: Not on file     Highest education level: Not on file   Occupational History     Not on file   Tobacco Use     Smoking status: Current Every Day Smoker     Packs/day: 0.50     Smokeless tobacco: Never Used   Vaping Use     Vaping Use: Never used   Substance and Sexual Activity     Alcohol use: No     Drug use: No     Sexual activity: Not on file   Other Topics Concern     Not on file   Social History Narrative     Not on file     Social Determinants of Health     Financial Resource Strain:      Difficulty of Paying Living Expenses:    Food Insecurity:      Worried About Running Out of Food in the Last Year:      Ran Out of Food in the Last Year:    Transportation Needs:      Lack of Transportation (Medical):      Lack of Transportation (Non-Medical):    Physical Activity:      Days of Exercise per Week:      Minutes of Exercise per Session:    Stress:      Feeling of Stress :    Social  "Connections:      Frequency of Communication with Friends and Family:      Frequency of Social Gatherings with Friends and Family:      Attends Confucianism Services:      Active Member of Clubs or Organizations:      Attends Club or Organization Meetings:      Marital Status:    Intimate Partner Violence:      Fear of Current or Ex-Partner:      Emotionally Abused:      Physically Abused:      Sexually Abused:        History reviewed. No pertinent past medical history.    Family History   Problem Relation Age of Onset     Rheumatoid Arthritis Mother        Past Surgical History:   Procedure Laterality Date     HERNIA REPAIR Right        Objective:     /72 (BP Location: Left arm, Patient Position: Sitting, Cuff Size: Adult Regular)   Pulse 84   Ht 1.632 m (5' 4.25\")   Wt 82.8 kg (182 lb 8 oz)   SpO2 99%   BMI 31.08 kg/m      Patient is alert, no obvious distress.   Skin: Warm, dry.  No rashes or lesions. Skin turgor rapid return.   HEENT:  Eyes normal.  Ears normal.  Nose patent, mucosa pink.  Oropharynx mucosa pink, no lesions or tonsil enlargement.   Neck:  Supple, without lymphadenopathy, bruits, JVD. Thyroid normal texture and size.    Lungs:  Clear to auscultation.  No wheezing, rales noted.  Respirations even and unlabored.   Heart:  Regular rate and rhythm.  No murmurs.   Breasts:  Normal.  No surrounding adenopathy.   Abdomen: Soft, nontender.  No organomegaly.  Bowel sounds normoactive.  No guarding or masses noted.   :  External genitalia normal.  Normal vaginal mucosa.  Cervix no lesions or cervical motion tenderness.   Musculoskeletal:  Full ROM of extremities.  Muscle strength equal +5/5.   Neurological:  Cranial nerves 2-12 intact.                 "

## 2021-11-06 LAB — B BURGDOR IGG+IGM SER QL: 0.03

## 2021-11-08 LAB
ANA SER QL IF: NEGATIVE
HCV AB SERPL QL IA: NEGATIVE

## 2021-11-09 LAB
HUMAN PAPILLOMA VIRUS 16 DNA: NEGATIVE
HUMAN PAPILLOMA VIRUS 18 DNA: NEGATIVE
HUMAN PAPILLOMA VIRUS FINAL DIAGNOSIS: NORMAL
HUMAN PAPILLOMA VIRUS OTHER HR: NEGATIVE

## 2021-11-10 ENCOUNTER — TELEPHONE (OUTPATIENT)
Dept: FAMILY MEDICINE | Facility: CLINIC | Age: 36
End: 2021-11-10
Payer: MEDICAID

## 2021-11-10 DIAGNOSIS — R76.8 ELEVATED RHEUMATOID FACTOR: Primary | ICD-10-CM

## 2021-11-10 DIAGNOSIS — M25.50 POLYARTHRALGIA: ICD-10-CM

## 2021-11-10 NOTE — TELEPHONE ENCOUNTER
Patient calling to request results from 11/5/2021. She states she is particularly worried about the Rheumatoid factor results.    Please call patient on her cell phone.

## 2021-11-15 LAB
BKR LAB AP GYN ADEQUACY: NORMAL
BKR LAB AP GYN INTERPRETATION: NORMAL
BKR LAB AP HPV REFLEX: NORMAL
BKR LAB AP PREVIOUS ABNORMAL: NORMAL
PATH REPORT.COMMENTS IMP SPEC: NORMAL
PATH REPORT.COMMENTS IMP SPEC: NORMAL
PATH REPORT.RELEVANT HX SPEC: NORMAL

## 2021-12-22 ENCOUNTER — TELEPHONE (OUTPATIENT)
Dept: FAMILY MEDICINE | Facility: CLINIC | Age: 36
End: 2021-12-22

## 2021-12-22 NOTE — TELEPHONE ENCOUNTER
Patient states that she forgot to ask for cough syrup with codeine at her visit today. Patient uses [facility] in [place].     Please advise.

## 2021-12-23 ENCOUNTER — TELEPHONE (OUTPATIENT)
Dept: FAMILY MEDICINE | Facility: CLINIC | Age: 36
End: 2021-12-23
Payer: MEDICAID

## 2021-12-23 DIAGNOSIS — R05.9 COUGH: Primary | ICD-10-CM

## 2021-12-23 RX ORDER — CODEINE PHOSPHATE AND GUAIFENESIN 10; 100 MG/5ML; MG/5ML
1-2 SOLUTION ORAL EVERY 6 HOURS PRN
Qty: 118 ML | Refills: 0 | Status: SHIPPED | OUTPATIENT
Start: 2021-12-23 | End: 2022-01-30

## 2021-12-23 NOTE — TELEPHONE ENCOUNTER
Called in requesting Cheratussin AC.  Had a visit yesterday. Was given a z-pack, has not started. Has tried Cheratussin AC before. Having a hard time sleeping. Hx of Asthma. To PCP for review.

## 2022-01-07 ENCOUNTER — OFFICE VISIT (OUTPATIENT)
Dept: ALLERGY | Facility: CLINIC | Age: 37
End: 2022-01-07
Attending: NURSE PRACTITIONER
Payer: COMMERCIAL

## 2022-01-07 VITALS
WEIGHT: 184.5 LBS | HEIGHT: 64 IN | RESPIRATION RATE: 16 BRPM | BODY MASS INDEX: 31.5 KG/M2 | OXYGEN SATURATION: 100 % | HEART RATE: 77 BPM

## 2022-01-07 DIAGNOSIS — J45.41 MODERATE PERSISTENT ASTHMA WITH EXACERBATION: Primary | ICD-10-CM

## 2022-01-07 DIAGNOSIS — Z01.82 ENCOUNTER FOR ALLERGY TESTING: ICD-10-CM

## 2022-01-07 PROBLEM — J45.30 MILD PERSISTENT ASTHMA WITHOUT COMPLICATION: Status: ACTIVE | Noted: 2020-07-31

## 2022-01-07 PROCEDURE — 99203 OFFICE O/P NEW LOW 30 MIN: CPT | Mod: 25 | Performed by: ALLERGY & IMMUNOLOGY

## 2022-01-07 PROCEDURE — 95004 PERQ TESTS W/ALRGNC XTRCS: CPT | Performed by: ALLERGY & IMMUNOLOGY

## 2022-01-07 ASSESSMENT — ASTHMA QUESTIONNAIRES
QUESTION_1 LAST FOUR WEEKS HOW MUCH OF THE TIME DID YOUR ASTHMA KEEP YOU FROM GETTING AS MUCH DONE AT WORK, SCHOOL OR AT HOME: SOME OF THE TIME
QUESTION_3 LAST FOUR WEEKS HOW OFTEN DID YOUR ASTHMA SYMPTOMS (WHEEZING, COUGHING, SHORTNESS OF BREATH, CHEST TIGHTNESS OR PAIN) WAKE YOU UP AT NIGHT OR EARLIER THAN USUAL IN THE MORNING: NOT AT ALL
QUESTION_2 LAST FOUR WEEKS HOW OFTEN HAVE YOU HAD SHORTNESS OF BREATH: ONCE A DAY
ACT_TOTALSCORE: 14
QUESTION_4 LAST FOUR WEEKS HOW OFTEN HAVE YOU USED YOUR RESCUE INHALER OR NEBULIZER MEDICATION (SUCH AS ALBUTEROL): ONE OR TWO TIMES PER DAY
QUESTION_5 LAST FOUR WEEKS HOW WOULD YOU RATE YOUR ASTHMA CONTROL: POORLY CONTROLLED

## 2022-01-07 ASSESSMENT — MIFFLIN-ST. JEOR: SCORE: 1515.86

## 2022-01-07 NOTE — PATIENT INSTRUCTIONS
Allergy testing negative    Symbicort 2 puffs twice daily     If needing albuterol greater than 2 times per week outside of exercise notify

## 2022-01-07 NOTE — PROGRESS NOTES
"      Subjective       HPI     Chief complaint: Asthma    History of present illness: This is a pleasant 36-year-old woman I was asked to see for evaluation by Luz Carrasco in regards to asthma. Patient states that she has had adult onset asthma. She does not think she has any allergies. She was prescribed montelukast, however, she feels this helps. She is currently taking Symbicort 160/4.52 puffs daily. She typically uses this at night. She notes that that is when she will wheeze. She does not use in the morning because he does not wheeze at that time. She states that she will either have to use her albuterol inhaler or Symbicort inhaler throughout the day when she does minimal activity such as vacuuming. She states minimal activities will trigger shortness of breath. Albuterol inhaler Symbicort inhaler does relieve the symptoms. This happens almost every day. It does not wake her up at night. She denies a history of reflux. No history of sinusitis.    Past medical history: Otherwise unremarkable    Social history: She works as a , has dog at home, lives at home with central air, smoker      Family history: Mother with allergies        Review of Systems   Constitutional, HEENT, cardiovascular, pulmonary, gi and gu systems are negative, except as otherwise noted.      Objective    Pulse 77   Resp 16   Ht 1.632 m (5' 4.25\")   Wt 83.7 kg (184 lb 8 oz)   SpO2 100%   BMI 31.42 kg/m    Body mass index is 31.42 kg/m .  Physical Exam      Gen: Pleasant female not in acute distress  HEENT: Eyes no erythema of the bulbar or palpebral conjunctiva, no edema. Ears: No deformities or lesions nose: No congestion, mucosa normal. Mouth: Throat clear, no lip or tongue edema.   Cardiac: Regular rate and rhythm, no murmurs, rubs or gallops  Respiratory: Clear to auscultation bilaterally, no adventitious breath sounds  Lymph: No visible supraclavicular or cervical lymphadenopathy  Skin: No rashes or lesions    30 " percutaneous test were undertaken to the environmental skin test panel.  Positive histamine control with a negative allergy skin test.  Please see scanned photograph.    Impression report and plan:  1. Nonallergic asthma    Allergy testing was negative. Increase Symbicort to 2 puffs twice daily. Explained importance of using this twice daily. I suspect this will help with some of her exercise-induced symptoms. Okay to premedicate exercise with albuterol. If she is still having shortness of breath in a month, I recommend that she have a pulmonary function test performed. I think she can stop montelukast. Follow as needed.  Psych: Alert and oriented times 3

## 2022-01-07 NOTE — LETTER
"    1/7/2022         RE: Judy Peterson  [address]  [place] [address]        Dear Colleague,    Thank you for referring your patient, Judy Peterson, to the [facility]. Please see a copy of my visit note below.          Subjective       HPI     Chief complaint: Asthma    History of present illness: This is a pleasant 36-year-old woman I was asked to see for evaluation by Luz Carrasco in regards to asthma. Patient states that she has had adult onset asthma. She does not think she has any allergies. She was prescribed montelukast, however, she feels this helps. She is currently taking Symbicort 160/4.52 puffs daily. She typically uses this at night. She notes that that is when she will wheeze. She does not use in the morning because he does not wheeze at that time. She states that she will either have to use her albuterol inhaler or Symbicort inhaler throughout the day when she does minimal activity such as vacuuming. She states minimal activities will trigger shortness of breath. Albuterol inhaler Symbicort inhaler does relieve the symptoms. This happens almost every day. It does not wake her up at night. She denies a history of reflux. No history of sinusitis.    Past medical history: Otherwise unremarkable    Social history: She works as a , has dog at home, lives at home with central air, smoker      Family history: Mother with allergies        Review of Systems   Constitutional, HEENT, cardiovascular, pulmonary, gi and gu systems are negative, except as otherwise noted.      Objective    Pulse 77   Resp 16   Ht 1.632 m (5' 4.25\")   Wt 83.7 kg (184 lb 8 oz)   SpO2 100%   BMI 31.42 kg/m    Body mass index is 31.42 kg/m .  Physical Exam      Gen: Pleasant female not in acute distress  HEENT: Eyes no erythema of the bulbar or palpebral conjunctiva, no edema. Ears: No deformities or lesions nose: No congestion, mucosa normal. Mouth: Throat clear, no lip or " tongue edema.   Cardiac: Regular rate and rhythm, no murmurs, rubs or gallops  Respiratory: Clear to auscultation bilaterally, no adventitious breath sounds  Lymph: No visible supraclavicular or cervical lymphadenopathy  Skin: No rashes or lesions    30 percutaneous test were undertaken to the environmental skin test panel.  Positive histamine control with a negative allergy skin test.  Please see scanned photograph.    Impression report and plan:  1. Nonallergic asthma    Allergy testing was negative. Increase Symbicort to 2 puffs twice daily. Explained importance of using this twice daily. I suspect this will help with some of her exercise-induced symptoms. Okay to premedicate exercise with albuterol. If she is still having shortness of breath in a month, I recommend that she have a pulmonary function test performed. I think she can stop montelukast. Follow as needed.  Psych: Alert and oriented times 3        Again, thank you for allowing me to participate in the care of your patient.        Sincerely,        Meseret ZABALA MD

## 2022-01-08 ASSESSMENT — ASTHMA QUESTIONNAIRES: ACT_TOTALSCORE: 14

## 2022-01-30 ENCOUNTER — OFFICE VISIT (OUTPATIENT)
Dept: FAMILY MEDICINE | Facility: CLINIC | Age: 37
End: 2022-01-30
Payer: COMMERCIAL

## 2022-01-30 VITALS
OXYGEN SATURATION: 97 % | RESPIRATION RATE: 18 BRPM | SYSTOLIC BLOOD PRESSURE: 122 MMHG | WEIGHT: 182 LBS | HEART RATE: 87 BPM | BODY MASS INDEX: 31 KG/M2 | DIASTOLIC BLOOD PRESSURE: 80 MMHG | TEMPERATURE: 97.2 F

## 2022-01-30 DIAGNOSIS — K04.7 INFECTED TOOTH: ICD-10-CM

## 2022-01-30 PROCEDURE — 99213 OFFICE O/P EST LOW 20 MIN: CPT | Performed by: NURSE PRACTITIONER

## 2022-01-30 RX ORDER — NAPROXEN 500 MG/1
500 TABLET ORAL 2 TIMES DAILY PRN
Qty: 30 TABLET | Refills: 0 | Status: SHIPPED | OUTPATIENT
Start: 2022-01-30 | End: 2022-03-17

## 2022-01-30 RX ORDER — FLUCONAZOLE 150 MG/1
150 TABLET ORAL ONCE
Qty: 1 TABLET | Refills: 0 | Status: SHIPPED | OUTPATIENT
Start: 2022-01-30 | End: 2022-01-30

## 2022-01-30 RX ORDER — PENICILLIN V POTASSIUM 500 MG/1
500 TABLET, FILM COATED ORAL 2 TIMES DAILY
Qty: 20 TABLET | Refills: 0 | Status: SHIPPED | OUTPATIENT
Start: 2022-01-30 | End: 2022-02-09

## 2022-01-30 ASSESSMENT — ENCOUNTER SYMPTOMS
CHILLS: 0
FEVER: 0

## 2022-01-30 NOTE — PATIENT INSTRUCTIONS
Patient Education     Dental Abscess  An abscess is a sac of fluid (pus). A dental abscess forms when a tooth or the tissue around it becomes infected with bacteria. The bacteria can enter through a cavity or a crack in a tooth. It can also infect the gum tissue or bone around a tooth. An untreated abscess can cause the loss of the tooth. It can even spread to other parts of the body and become life-threatening.     Symptoms of a dental abscess   Symptoms include:    Toothache, often severe    Tooth pain with hot, cold, or pressure    Pain in the gums, cheek, or jaw    Bad breath or bitter taste in the mouth    Trouble swallowing or opening the mouth    Fever    Swollen or enlarged neck glands  Diagnosing a dental abscess  The dentist will ask about your symptoms and check your teeth and gums. You will be told if you need any tests, such as dental X-rays.   Treating a dental abscess  Treatments for a dental abscess may include:     Antibiotic medicines. These treat the underlying infection.    Pain relievers. These help you feel more comfortable. Your healthcare provider may prescribe a medicine for you. Or you may use over-the-counter pain relievers, such as acetaminophen or ibuprofen.    Warm saltwater rinses. These can soothe mild pain and help clear away pus.    Root canal surgery.  This may be done if needed to save the tooth. With a root canal, the infected part of the tooth is removed. A special substance is then used to fill the empty space in the tooth.    Draining the abscess. This may be done if needed. Cuts (incisions) are made to let the infected material drain from the tooth.    Removing the tooth. This is done in cases of severe infection that can t be treated another way.  You may need to be admitted to a hospital if the infection is severe, has spread, or doesn t respond to treatment.   When to get medical advice  Call your dentist right away if you have any of the following:     Fever of 100.4 F  Detail Level: Simple  ( 38 C) or higher    More pain, redness, drainage, or swelling in the treated area    Face or jawbone swelling    Pain that can't be controlled with medicines  Preventing dental abscess  To prevent another abscess in the future, keep your teeth clean and healthy. Brush twice a day and floss at least once daily. See your dentist for regular exams and tooth cleanings. And stay away from sugary foods and drinks that can lead to tooth decay.   [employer] last reviewed this educational content on 2/1/2020 2000-2021 [employer]. All rights reserved. This information is not intended as a substitute for professional medical care. Always follow your healthcare professional's instructions.         Please follow-up with dentist as soon as feasible early next week.   X Size Of Lesion In Cm (Optional): 1 Size Of Lesion: 1.5

## 2022-01-30 NOTE — PROGRESS NOTES
Patient presents with:  Dental Pain: Left side - tooth pain that started this morning - Took Trey and Ibuprofen this morning       Clinical Decision Making: Focused exam of HENT, benign with exception of positive left lower molar infected appearing tooth, swelling around tooth base, with erythremia, and that tender to palpation, otherwise no adenopathy or significant mucopurulent drainage noted.Multiple teeth in various stages of health and decay. Encourage patient to need to have consistent follow-up with dentist.  Will prescribe a course of antibiotics and Naprosyn to reduce pain/swelling to be taken with food.  Diflucan also prescribed due to history of yeast infections with antibiotics.  Encourage patient to increase water intake avoid smoking and monitor symptoms for improvement.  Education provided.      ICD-10-CM    1. Infected tooth  K04.7 penicillin V (VEETID) 500 MG tablet     naproxen (NAPROSYN) 500 MG tablet     fluconazole (DIFLUCAN) 150 MG tablet       Patient Instructions     Patient Education     Dental Abscess  An abscess is a sac of fluid (pus). A dental abscess forms when a tooth or the tissue around it becomes infected with bacteria. The bacteria can enter through a cavity or a crack in a tooth. It can also infect the gum tissue or bone around a tooth. An untreated abscess can cause the loss of the tooth. It can even spread to other parts of the body and become life-threatening.     Symptoms of a dental abscess   Symptoms include:    Toothache, often severe    Tooth pain with hot, cold, or pressure    Pain in the gums, cheek, or jaw    Bad breath or bitter taste in the mouth    Trouble swallowing or opening the mouth    Fever    Swollen or enlarged neck glands  Diagnosing a dental abscess  The dentist will ask about your symptoms and check your teeth and gums. You will be told if you need any tests, such as dental X-rays.   Treating a dental abscess  Treatments for a dental abscess may include:      Antibiotic medicines. These treat the underlying infection.    Pain relievers. These help you feel more comfortable. Your healthcare provider may prescribe a medicine for you. Or you may use over-the-counter pain relievers, such as acetaminophen or ibuprofen.    Warm saltwater rinses. These can soothe mild pain and help clear away pus.    Root canal surgery.  This may be done if needed to save the tooth. With a root canal, the infected part of the tooth is removed. A special substance is then used to fill the empty space in the tooth.    Draining the abscess. This may be done if needed. Cuts (incisions) are made to let the infected material drain from the tooth.    Removing the tooth. This is done in cases of severe infection that can t be treated another way.  You may need to be admitted to a hospital if the infection is severe, has spread, or doesn t respond to treatment.   When to get medical advice  Call your dentist right away if you have any of the following:     Fever of 100.4 F  ( 38 C) or higher    More pain, redness, drainage, or swelling in the treated area    Face or jawbone swelling    Pain that can't be controlled with medicines  Preventing dental abscess  To prevent another abscess in the future, keep your teeth clean and healthy. Brush twice a day and floss at least once daily. See your dentist for regular exams and tooth cleanings. And stay away from sugary foods and drinks that can lead to tooth decay.   Patricia last reviewed this educational content on 2/1/2020 2000-2021 [employer]. All rights reserved. This information is not intended as a substitute for professional medical care. Always follow your healthcare professional's instructions.         Please follow-up with dentist as soon as feasible early next week.      HPI: Judy Peterson is a 36 year old female who presents today complaining of sudden onset left lower jaw tooth pain, that began at 8 AM this morning.  Patient reports past history of recurrent tooth infections that she is intermittently seen for by dentist, however they are not open at this time.  Patient does endorse being a smoker. Reports pain is a 10 at time of the numeric pain scale, initially took 2 Trey with minimal relief and then took a dose of OTC ibuprofen, reports that she has had some improvement of pain now.  Denies any fevers or chills at this time.    History obtained from the patient.  LMP: Approximately 2 weeks ago, no concerns of pregnancy.    Problem List:  2020-07: Mild persistent asthma without complication  2016-10: Smoking  Phenylketonuria  Anxiety  Attention deficit hyperactivity disorder (ADHD)      No past medical history on file.    Social History     Tobacco Use     Smoking status: Current Every Day Smoker     Packs/day: 0.50     Smokeless tobacco: Never Used   Substance Use Topics     Alcohol use: No       Review of Systems   Constitutional: Negative for chills and fever.   HENT: Positive for dental problem.        Vitals:    01/30/22 1130   BP: 122/80   Pulse: 87   Resp: 18   Temp: 97.2  F (36.2  C)   TempSrc: Tympanic   SpO2: 97%   Weight: 82.6 kg (182 lb)       Physical Exam  Constitutional:       Appearance: Normal appearance. She is not ill-appearing or diaphoretic.   HENT:      Head: Normocephalic and atraumatic.      Right Ear: Tympanic membrane, ear canal and external ear normal.      Left Ear: Tympanic membrane, ear canal and external ear normal.      Nose: Congestion and rhinorrhea present.      Mouth/Throat:      Mouth: Mucous membranes are moist.      Pharynx: Posterior oropharyngeal erythema present. No oropharyngeal exudate.      Comments: Mild erythremia present in pharynx related to postnasal drip visualized.    Left lower jaw noted with last molar with surrounding erythremia, swelling and tenderness to palpation.  No mucopurulent drainage present.  Multiple teeth with various stages of health and decay  noted.  Cardiovascular:      Rate and Rhythm: Normal rate and regular rhythm.      Pulses: Normal pulses.      Heart sounds: Normal heart sounds.   Pulmonary:      Effort: Pulmonary effort is normal.      Breath sounds: Normal breath sounds.   Musculoskeletal:      Cervical back: Neck supple.   Lymphadenopathy:      Cervical: No cervical adenopathy.   Skin:     General: Skin is warm.      Capillary Refill: Capillary refill takes less than 2 seconds.   Neurological:      Mental Status: She is alert and oriented to person, place, and time.   Psychiatric:         Behavior: Behavior normal.         Labs:  No results found for any visits on 01/30/22.      At the end of the encounter, I discussed results, diagnosis, medications. Discussed red flags for immediate return to clinic/ER, as well as indications for follow up if no improvement. Patient understood and agreed to plan.     CHRIS Serra CNP

## 2022-02-01 ENCOUNTER — OFFICE VISIT (OUTPATIENT)
Dept: FAMILY MEDICINE | Facility: CLINIC | Age: 37
End: 2022-02-01
Payer: COMMERCIAL

## 2022-02-01 ENCOUNTER — NURSE TRIAGE (OUTPATIENT)
Dept: NURSING | Facility: CLINIC | Age: 37
End: 2022-02-01

## 2022-02-01 VITALS
RESPIRATION RATE: 18 BRPM | OXYGEN SATURATION: 100 % | DIASTOLIC BLOOD PRESSURE: 86 MMHG | HEART RATE: 86 BPM | SYSTOLIC BLOOD PRESSURE: 146 MMHG | TEMPERATURE: 98.4 F

## 2022-02-01 DIAGNOSIS — K08.89 PAIN, DENTAL: Primary | ICD-10-CM

## 2022-02-01 DIAGNOSIS — R11.0 NAUSEA: Primary | ICD-10-CM

## 2022-02-01 PROCEDURE — 99214 OFFICE O/P EST MOD 30 MIN: CPT | Performed by: PHYSICIAN ASSISTANT

## 2022-02-01 RX ORDER — ONDANSETRON 4 MG/1
4 TABLET, FILM COATED ORAL EVERY 8 HOURS PRN
Qty: 12 TABLET | Refills: 0 | Status: SHIPPED | OUTPATIENT
Start: 2022-02-01 | End: 2022-03-17

## 2022-02-01 RX ORDER — ACETAMINOPHEN 500 MG
500-1000 TABLET ORAL EVERY 6 HOURS PRN
Qty: 50 TABLET | Refills: 0 | Status: SHIPPED | OUTPATIENT
Start: 2022-02-01 | End: 2022-03-17

## 2022-02-01 RX ORDER — ONDANSETRON 4 MG/1
4 TABLET, ORALLY DISINTEGRATING ORAL EVERY 8 HOURS PRN
Qty: 12 TABLET | Refills: 0 | Status: SHIPPED | OUTPATIENT
Start: 2022-02-01 | End: 2022-03-17

## 2022-02-01 ASSESSMENT — ENCOUNTER SYMPTOMS
CONSTIPATION: 0
ABDOMINAL PAIN: 1
NAUSEA: 1
FEVER: 0
VOMITING: 1
DIARRHEA: 0
CHILLS: 0

## 2022-02-01 NOTE — PROGRESS NOTES
Patient presents with:  Nausea: CURRENTLY ON PENICILLIN SINCE 1/30-- HAS BEEN MAKING HER NAUSEOUS, VOMITING, SICK.       Clinical Decision Making: Patient experiencing dental pain.  No obvious findings concerning for dental infection.  Patient discontinued off of penicillin V due to side effects.  Patient prescribed Zofran for nausea control.  Recommend decreasing the amount of NSAIDs and replacing with Tylenol instead to help with stomach upset.  Patient has follow-up with dental professional in 2 days.  Note for work given to excuse her absence until Friday.      ICD-10-CM    1. Pain, dental  K08.89 ondansetron (ZOFRAN-ODT) 4 MG ODT tab     acetaminophen (TYLENOL) 500 MG tablet       Patient Instructions   1.  Discontinue the penicillin.  2.  You may use Zofran as needed for nausea relief.  3.  Take naproxen 500 mg once in the morning and once in the evening.  Try to take this medication with some food.  4.  Take extra strength Tylenol 500 to 1000 mg every 6 hours.  Do not exceed 4000 mg in a 24-hour.  5.  Follow-up with dentist on Thursday.  6.  Avoid hot foods if they aggravate your symptoms.  Recommend eating soft or blended foods such as pasta, applesauce, smoothies, puddings, purées.      HPI:  Judy Peterson is a 36 year old female who presents today complaining of nausea and vomiting secondary to taking penicillin VK.  Patient started penicillin on 1/30 for treatment of an infected tooth.  Being on the penicillin has not helped with her pain.  She has a follow-up appointment with the dentist on Thursday, 2/3.  Patient denies any fevers or chills.  Patient also admits to taking more naproxen than previously recommended to her and she is also taking ibuprofen and bare aspirin.  She states that her stomach has been hurting a lot.    History obtained from the patient.    Problem List:  2020-07: Mild persistent asthma without complication  2016-10: Smoking  Phenylketonuria  Anxiety  Attention deficit  hyperactivity disorder (ADHD)      No past medical history on file.    Social History     Tobacco Use     Smoking status: Current Every Day Smoker     Packs/day: 0.50     Smokeless tobacco: Never Used   Substance Use Topics     Alcohol use: No       Review of Systems   Constitutional: Negative for chills and fever.   HENT: Positive for dental problem.    Gastrointestinal: Positive for abdominal pain, nausea and vomiting. Negative for constipation and diarrhea.       Vitals:    02/01/22 1406   BP: (!) 146/86   BP Location: Right arm   Patient Position: Sitting   Cuff Size: Adult Regular   Pulse: 86   Resp: 18   Temp: 98.4  F (36.9  C)   SpO2: 100%       Physical Exam  Vitals and nursing note reviewed.   Constitutional:       General: She is not in acute distress.     Appearance: She is not toxic-appearing or diaphoretic.   HENT:      Head: Normocephalic and atraumatic.      Right Ear: External ear normal.      Left Ear: External ear normal.      Mouth/Throat:      Lips: Pink.      Mouth: Mucous membranes are moist.     Eyes:      Conjunctiva/sclera: Conjunctivae normal.   Pulmonary:      Effort: Pulmonary effort is normal. No respiratory distress.   Neurological:      Mental Status: She is alert.   Psychiatric:         Mood and Affect: Mood normal.         Behavior: Behavior normal.         Thought Content: Thought content normal.         Judgment: Judgment normal.       At the end of the encounter, I discussed results, diagnosis, medications. Discussed red flags for immediate return to clinic/ER, as well as indications for follow up if no improvement. Patient understood and agreed to plan. Patient was stable for discharge.

## 2022-02-01 NOTE — PATIENT INSTRUCTIONS
1.  Discontinue the penicillin.  2.  You may use Zofran as needed for nausea relief.  3.  Take naproxen 500 mg once in the morning and once in the evening.  Try to take this medication with some food.  4.  Take extra strength Tylenol 500 to 1000 mg every 6 hours.  Do not exceed 4000 mg in a 24-hour.  5.  Follow-up with dentist on Thursday.  6.  Avoid hot foods if they aggravate your symptoms.  Recommend eating soft or blended foods such as pasta, applesauce, smoothies, puddings, purées.

## 2022-02-01 NOTE — LETTER
February 1, 2022      Judy VALENTINE Kristen  [address] [address]        To Whom It May Concern:    Judy Peterson  was seen on 2/1/22.  Please excuse her  until 2/4/22.        Sincerely,        Yamilka Dietrich PA-C

## 2022-02-02 NOTE — TELEPHONE ENCOUNTER
Triage Call:    Patient called to report she was prescribed Zofran-ODT and saw that it contains phenylalanine.  Patient has phenylketonuria.  She has been having dental pain with nausea.  She is scheduled to see her dentist tomorrow.    According to the protocol, patient should call PCP now.   On-call provider, brandon Sauer MD Recommendation:  New order placed for 4mg Zofran oral tablet.    Called patient to advise of new order at preferred pharmacy to be ready tomorrow.  Care advice given. Patient verbalizes understanding and agrees with plan of care.     Meseret Perez RN  02/01/22 8:30 PM  [facility] Nurse Advisor    COVID 19 Nurse Triage Plan/Patient Instructions    Please be aware that novel coronavirus (COVID-19) may be circulating in the community. If you develop symptoms such as fever, cough, or SOB or if you have concerns about the presence of another infection including coronavirus (COVID-19), please contact your health care provider or visit [email or web address].     Disposition/Instructions    Home care recommended. Follow home care protocol based instructions.    Thank you for taking steps to prevent the spread of this virus.  o Limit your contact with others.  o Wear a simple mask to cover your cough.  o Wash your hands well and often.    Resources    [facility]: About COVID-19: [email or web address]    CDC: What to Do If You're Sick: www.cdc.gov/coronavirus/2019-ncov/about/steps-when-sick.html    CDC: Ending Home Isolation: www.cdc.gov/coronavirus/2019-ncov/hcp/disposition-in-home-patients.html     CDC: Caring for Someone: www.cdc.gov/coronavirus/2019-ncov/if-you-are-sick/care-for-someone.html     [facility]: Interim Guidance for Hospital Discharge to Home: www.health.[email or web address]/[email or web address]/hcp/hospdischarge.[email or web address]    [facility] clinical trials (COVID-19 research studies): [email or web address]     Below are the COVID-19  "hotlines at the [place] Department of Health ([facility]). Interpreters are available.   o For health questions: Call 675-344-1634 or 1-496.271.5389 (7 a.m. to 7 p.m.)  o For questions about schools and childcare: Call 851-739-0187 or 1-395.198.7996 (7 a.m. to 7 p.m.)     Reason for Disposition    [1] Caller has URGENT medication question about med that PCP or specialist prescribed AND [2] triager unable to answer question    Additional Information    Negative: Drug overdose and triager unable to answer question    Negative: Caller requesting information unrelated to medicine    Negative: Caller requesting a prescription for Strep throat and has a positive culture result    Negative: Rash while taking a medication or within 3 days of stopping it    Negative: Immunization reaction suspected    Negative: [1] Asthma and [2] having symptoms of asthma (cough, wheezing, etc.)    Negative: [1] Influenza symptoms AND [2] anti-viral med prescription request, such as Tamiflu    Negative: [1] Symptom of illness (e.g., headache, abdominal pain, earache, vomiting) AND [2] more than mild    Negative: MORE THAN A DOUBLE DOSE of a prescription or over-the-counter (OTC) drug    Negative: [1] DOUBLE DOSE (an extra dose or lesser amount) of over-the-counter (OTC) drug AND [2] any symptoms (e.g., dizziness, nausea, pain, sleepiness)    Negative: [1] DOUBLE DOSE (an extra dose or lesser amount) of prescription drug AND [2] any symptoms (e.g., dizziness, nausea, pain, sleepiness)    Negative: Took another person's prescription drug    Negative: [1] Pharmacy calling with prescription questions AND [2] triager unable to answer question    Negative: [1] Request for URGENT new prescription or refill of \"essential\" medication (i.e., likelihood of harm to patient if not taken) AND [2] triager unable to fill per unit policy    Negative: [1] DOUBLE DOSE (an extra dose or lesser amount) of prescription drug AND [2] NO symptoms (Exception: a double " dose of antibiotics)    Negative: Diabetes drug error or overdose (e.g., took wrong type of insulin or took extra dose)    Negative: [1] Prescription not at pharmacy AND [2] was prescribed by PCP recently    Protocols used: MEDICATION QUESTION CALL-A-AH

## 2022-03-10 ENCOUNTER — TELEPHONE (OUTPATIENT)
Dept: FAMILY MEDICINE | Facility: CLINIC | Age: 37
End: 2022-03-10
Payer: COMMERCIAL

## 2022-03-10 ENCOUNTER — NURSE TRIAGE (OUTPATIENT)
Dept: FAMILY MEDICINE | Facility: CLINIC | Age: 37
End: 2022-03-10
Payer: COMMERCIAL

## 2022-03-10 ENCOUNTER — OFFICE VISIT (OUTPATIENT)
Dept: FAMILY MEDICINE | Facility: CLINIC | Age: 37
End: 2022-03-10
Payer: COMMERCIAL

## 2022-03-10 VITALS
SYSTOLIC BLOOD PRESSURE: 133 MMHG | BODY MASS INDEX: 31 KG/M2 | WEIGHT: 182 LBS | TEMPERATURE: 98.1 F | RESPIRATION RATE: 18 BRPM | OXYGEN SATURATION: 99 % | DIASTOLIC BLOOD PRESSURE: 85 MMHG | HEART RATE: 75 BPM

## 2022-03-10 DIAGNOSIS — H61.22 IMPACTED CERUMEN OF LEFT EAR: Primary | ICD-10-CM

## 2022-03-10 PROCEDURE — 69209 REMOVE IMPACTED EAR WAX UNI: CPT | Mod: LT | Performed by: NURSE PRACTITIONER

## 2022-03-10 PROCEDURE — 99212 OFFICE O/P EST SF 10 MIN: CPT | Mod: 25 | Performed by: NURSE PRACTITIONER

## 2022-03-10 NOTE — TELEPHONE ENCOUNTER
"Patient got water in ear at 2:30 pm taking a bath.  No pain.  Is annoying.  Advised giving it time and it will resolve.  Patient agrees with plan.  Jessika Menard RN    Additional Information    Negative: MODERATE-SEVERE ear pain    Negative: Button battery    Negative: Sharp foreign body (e.g., glass, pin)    Negative: Bleeding from ear canal    Negative: Caller is unable to remove live insect    Negative: Caller is unable to remove the foreign body    Negative: Pus or discharge from the ear canal    Negative: All other foreign bodies in ear canal (Exception: insect or small smooth foreign body)    Negative: Foreign body was removed from the ear but still has pain    Negative: Patient wants to be seen    Negative: Small, smooth foreign body in ear canal    Negative: Live insect inside ear canal    Answer Assessment - Initial Assessment Questions  1. OBJECT: \"What do you think it is?\"       water  2. PAIN: \"Is it causing any pain?\" If so, \"How bad is it?\" (e.g., mild, moderate, severe)      No  3. ONSET: \"How long do you think it's been in there?\"      2:30 pm today  4. DISCHARGE: \"Has there been any discharge or bleeding from the ear?\" If so, \"Please describe it\".      no  5. PREGNANCY: \"Is there any chance you are pregnant?\" \"When was your last menstrual period?\"      No pregnant    Protocols used: EAR - FOREIGN BODY-A-OH      "

## 2022-03-11 NOTE — PROGRESS NOTES
Assessment & Plan     Impacted cerumen of left ear       Left cerumen impaction relieved with irrigation.  Discussed Debrox drops around the future.            No follow-ups on file.    Mariposa Rodriguez, CNP  [facility] TETO Kim is a 36 year old female who presents to clinic today for the following health issues:  Chief Complaint   Patient presents with     Ear Problem     Water in lt ear x 4hrs     HPI    Left ear feeling plugged after bath today.      Denies pain.      Hearing decreased since this happened.    No cough/congestion.     Has had cerumen impactions before.  Has been told in the past to avoid Q-tips.  Has not been using Q-tips.        Review of Systems  See HPI.  No other complaints.      Objective    /85   Pulse 75   Temp 98.1  F (36.7  C) (Tympanic)   Resp 18   Wt 82.6 kg (182 lb)   LMP 03/03/2022   SpO2 99%   BMI 31.00 kg/m    Physical Exam  Constitutional:       Appearance: Normal appearance.   HENT:      Right Ear: Tympanic membrane normal.      Left Ear: There is impacted cerumen.   Neurological:      Mental Status: She is alert.   Psychiatric:         Mood and Affect: Mood normal.         Behavior: Behavior normal.         Thought Content: Thought content normal.         Judgment: Judgment normal.

## 2022-03-11 NOTE — PATIENT INSTRUCTIONS
You may use Debrox available over-the-counter to help loosen earwax in the future.      Patient Education     Earwax Removal    The ear canal makes earwax from the canal s lining. The ears make wax to lubricate and protect the ear canal. The ear canal is the tube that connects the middle ear to the outside of the ear. The wax protects the ear from bacteria, infection, and damage from water or trauma.   The wax that forms in the canal naturally moves toward the outside of the ear and falls out. In some cases, the ear may make too much wax. If the wax causes problems or keeps the healthcare provider from seeing into the ear, the extra wax may be removed.   Too much wax can affect your hearing. It can cause itching. In rare cases, it can be painful. Earwax should not be removed unless it is causing a problem. You should not stick objects such as cotton swabs into your ear to remove wax unless told to do so by your healthcare provider.   Healthcare providers can remove earwax safely. Often flushing the earwax out with water (irrigation) and syringing will help. Sometimes devices or suction are used to remove the wax. It is important to stay still during the procedure to prevent damage to the ear canal. But removing earwax generally doesn t hurt. You won't need anesthesia or pain medicine when the provider removes the earwax.   A number of conditions lead to earwax buildup. These include some skin problems, a narrow ear canal, or ears that make too much earwax. Using cotton swabs in the canal pushes earwax deeper into the ear and helps lead to the buildup of earwax.   Home care    The healthcare provider may advise mineral oil or an over-the-counter (OTC) eardrop to use at home to soften the earwax. He or she may also advise a home irrigation or syringing kit. Use these products only if the provider advises them. Carefully follow the instructions given.    Don t use mineral oil or OTC eardrops if you might have an ear  "infection or a burst (ruptured) eardrum. Tell your provider right away if you have diabetes or an immune disorder.    Don t use cotton swabs in your ears. Cotton swabs may push wax deeper into the ear canal or damage the eardrum. Use cotton gauze or a wet washcloth to gently remove wax on the outside of the ear and around the opening to the ear canal.    Don't use any probing device or object such as cotton-tipped swabs or yary pins to clean the inside of your ears.    Don t use ear candles to clean your ears. Candling can be dangerous. It can burn the ear canal. It can also make the condition worse instead of better.    Don t use cold water to rinse the ear. This will make you dizzy. If your provider tells you to rinse your ear, use only warm water or follow his or her instructions.    Check the ear for signs of infection or irritation (listed below under \"When to get medical advice\").  Steps for using eardrops  1. Warm the medicine bottle by rubbing it between your hands for a few minutes.  2. Lie down on your side, with the affected ear up.  3. Place the advised number of drops in the ear. Wet a cotton ball with the medicine. Gently put the cotton ball into the ear opening.    Follow-up care  Follow up with your healthcare provider, or as advised.   When to get medical advice   Call your healthcare provider right away if you have:     Ear pain that gets worse    Fever of 100.4F F (38 C) or higher, or as directed by your healthcare provider    Worsening wax buildup    Severe pain, dizziness, or nausea    Bleeding from the ear    Hearing problems    Signs of irritation from the eardrops, such as burning, stinging, or swelling and soreness    Foul-smelling fluid draining from the ear    Signs of infection such as outer ear swelling, redness, or soreness    Headache, neck pain, or stiff neck  [employer] last reviewed this educational content on 6/1/2020 2000-2021 [employer]. All rights reserved. This " information is not intended as a substitute for professional medical care. Always follow your healthcare professional's instructions.

## 2022-03-11 NOTE — PROCEDURES
Left cerumen impaction cleared with irrigation by medical assistant.  Patient tolerated procedure with no complications.  Reports relief of symptoms.    Mariposa Rodriguez, CNP

## 2022-03-15 ENCOUNTER — TELEPHONE (OUTPATIENT)
Dept: FAMILY MEDICINE | Facility: CLINIC | Age: 37
End: 2022-03-15
Payer: COMMERCIAL

## 2022-03-15 NOTE — TELEPHONE ENCOUNTER
Pt is calling stating that she is needing a referral for an MRI for her hips. She has been seeing a chiropractor but is still experiencing a lot of pain and thinks theres more needed.     Pt wants to speak with Luz also about something in her Mychart from 2016 regarding her hips.     Please contact patient to discuss further.

## 2022-03-17 ENCOUNTER — TRANSFERRED RECORDS (OUTPATIENT)
Dept: HEALTH INFORMATION MANAGEMENT | Facility: CLINIC | Age: 37
End: 2022-03-17

## 2022-03-17 ENCOUNTER — OFFICE VISIT (OUTPATIENT)
Dept: FAMILY MEDICINE | Facility: CLINIC | Age: 37
End: 2022-03-17
Payer: COMMERCIAL

## 2022-03-17 VITALS
SYSTOLIC BLOOD PRESSURE: 120 MMHG | HEART RATE: 66 BPM | WEIGHT: 179.3 LBS | BODY MASS INDEX: 30.54 KG/M2 | DIASTOLIC BLOOD PRESSURE: 68 MMHG

## 2022-03-17 DIAGNOSIS — M25.552 BILATERAL HIP PAIN: Primary | ICD-10-CM

## 2022-03-17 DIAGNOSIS — M25.551 BILATERAL HIP PAIN: Primary | ICD-10-CM

## 2022-03-17 PROCEDURE — 99213 OFFICE O/P EST LOW 20 MIN: CPT | Performed by: NURSE PRACTITIONER

## 2022-03-17 NOTE — PROGRESS NOTES
Assessment and Plan:     Bilateral hip pain  Differentials include trochanteric bursitis, labral tear, osteoarthritis.  She has a known cyst and osseous fragment present.  Will refer to orthopedics for further evaluation.  Discussed symptomatic treatment including rest, ice, NSAIDs as needed.  She is content with the plan.  - Orthopedic  Referral        Subjective:     Judy is a 36 year old female presenting to the clinic for concerns for bilateral hip and leg pain.  Patient has a history of this in the past.  She had an x-ray in 2016 showing the following:    Narrative & Impression   XR PELVIS W 2 VW HIPS BILATERAL4/15/2016 10:04 AMINDICATION: Bilateral hip pain.COMPARISON: None.FINDINGS: There is a prominent subcortical cyst or geode present involving left acetabular roof. The acetabulum has a somewhat shallow angle and a somewhat   diminutive posterior superior wall. There is a 1 cm rounded osseous fragment that may be a portion of a cleft in the acetabulum. Mild degenerative changes.Right acetabular roof also has a shallow angle with mild degenerative changes.No acute   abnormalities, no fractures.MRI may be beneficial for further assessment of the hips.This report was electronically interpreted by: Dr. Max Nova MD ON 04/15/2016 at 10:16       At that time, she saw an orthopedic specialist who recommended physical therapy.  Patient states the pain has worsened over the past 3 weeks.  She has sought out chiropractic care with no relief.  She describes the pain as a sharp sensation that will make her drop to her knees.  She occasionally limps.  She denies any known injury.  She has tried stretching.  She denies numbness and tingling of her extremities.  She is taking Tylenol and ibuprofen for pain.    Reviewof Systems: A complete 14 point review of systems was obtained and is negative or as stated in the history of present illness.    Social History     Socioeconomic History     Marital status:  Single     Spouse name: Not on file     Number of children: Not on file     Years of education: Not on file     Highest education level: Not on file   Occupational History     Not on file   Tobacco Use     Smoking status: Current Every Day Smoker     Packs/day: 0.50     Smokeless tobacco: Never Used   Vaping Use     Vaping Use: Never used   Substance and Sexual Activity     Alcohol use: No     Drug use: No     Sexual activity: Not on file   Other Topics Concern     Not on file   Social History Narrative     Not on file     Social Determinants of Health     Financial Resource Strain: Not on file   Food Insecurity: Not on file   Transportation Needs: Not on file   Physical Activity: Not on file   Stress: Not on file   Social Connections: Not on file   Intimate Partner Violence: Not on file   Housing Stability: Not on file       Active Ambulatory Problems     Diagnosis Date Noted     Phenylketonuria      Anxiety      Attention deficit hyperactivity disorder (ADHD)      Smoking 10/04/2016     Mild persistent asthma without complication 07/31/2020     Resolved Ambulatory Problems     Diagnosis Date Noted     No Resolved Ambulatory Problems     No Additional Past Medical History       Family History   Problem Relation Age of Onset     Rheumatoid Arthritis Mother        Objective:     /68 (BP Location: Left arm, Patient Position: Sitting, Cuff Size: Adult Regular)   Pulse 66   Wt 81.3 kg (179 lb 4.8 oz)   LMP 03/03/2022   BMI 30.54 kg/m      Patient is alert, in no obvious distress.   Skin: Warm, dry.    Lungs:  Clear to auscultation. Respirations even and unlabored.  No wheezing or rales noted.   Heart:  Regular rate and rhythm.  No murmurs, S3, S4, gallops, or rubs.    Musculoskeletal: She has full range of motion of her hips.  She is able to squat with some difficulty.  She is able to heel and toe walk.

## 2022-03-24 ENCOUNTER — TRANSFERRED RECORDS (OUTPATIENT)
Dept: HEALTH INFORMATION MANAGEMENT | Facility: CLINIC | Age: 37
End: 2022-03-24
Payer: COMMERCIAL

## 2022-03-31 ENCOUNTER — TRANSFERRED RECORDS (OUTPATIENT)
Dept: HEALTH INFORMATION MANAGEMENT | Facility: CLINIC | Age: 37
End: 2022-03-31
Payer: COMMERCIAL

## 2022-03-31 NOTE — PROGRESS NOTES
Assessment and Plan:   1. Viral warts, unspecified type       Discussed symptomatic treatment and proper wound care.  If no improvement with the symptoms, she is to follow-up in 2 to 4 weeks for more cryotherapy.  She is content with the plan.      Subjective:     Judy is a 34 y.o. female presenting to the clinic for wart treatment.  Patient has a wart present on her right hand second finger.  She has had it for over 2 years.  It has not changed in size and is not painful.  She was seen in clinic on 6/24/2019 where she received cryotherapy.  She has found some slight improvement with the work.  She has not tried any over-the-counter products for symptoms.    Review of Systems: A complete 14 point review of systems was obtained and is negative or as stated in the history of present illness.    Social History     Socioeconomic History     Marital status: Single     Spouse name: Not on file     Number of children: Not on file     Years of education: Not on file     Highest education level: Not on file   Occupational History     Not on file   Social Needs     Financial resource strain: Not on file     Food insecurity:     Worry: Not on file     Inability: Not on file     Transportation needs:     Medical: Not on file     Non-medical: Not on file   Tobacco Use     Smoking status: Current Every Day Smoker     Packs/day: 1.00     Smokeless tobacco: Never Used   Substance and Sexual Activity     Alcohol use: No     Frequency: Never     Drug use: No     Sexual activity: Not on file   Lifestyle     Physical activity:     Days per week: Not on file     Minutes per session: Not on file     Stress: Not on file   Relationships     Social connections:     Talks on phone: Not on file     Gets together: Not on file     Attends Cheondo[employer] service: Not on file     Active member of club or organization: Not on file     Attends meetings of clubs or organizations: Not on file     Relationship status: Not on file     Intimate partner  "violence:     Fear of current or ex partner: Not on file     Emotionally abused: Not on file     Physically abused: Not on file     Forced sexual activity: Not on file   Other Topics Concern     Not on file   Social History Narrative     Not on file       Active Ambulatory Problems     Diagnosis Date Noted     Phenylketonuria      Anxiety      Attention deficit hyperactivity disorder (ADHD)      Smoking 10/04/2016     Resolved Ambulatory Problems     Diagnosis Date Noted     Cerumen Impaction In Both Ears      Sinusitis      Acute bronchitis      Conjunctivitis      No Additional Past Medical History       No family history on file.    Objective:     /70   Pulse 85   Ht 5' 4\" (1.626 m)   Wt 168 lb 6.4 oz (76.4 kg)   SpO2 98%   BMI 28.91 kg/m      Patient is alert, in no obvious distress.   Skin: Warm, dry.   Musculoskeletal:  She has a small wart present on her right hand second finger.  I debrided the area, performed cryotherapy freeze-thaw-freeze cycle x 3.  Patient tolerated the procedure well.               " 30-Mar-2022 23:59

## 2022-04-06 ENCOUNTER — TRANSFERRED RECORDS (OUTPATIENT)
Dept: HEALTH INFORMATION MANAGEMENT | Facility: CLINIC | Age: 37
End: 2022-04-06
Payer: COMMERCIAL

## 2022-04-13 ENCOUNTER — TELEPHONE (OUTPATIENT)
Dept: NURSING | Facility: CLINIC | Age: 37
End: 2022-04-13
Payer: COMMERCIAL

## 2022-04-13 ENCOUNTER — NURSE TRIAGE (OUTPATIENT)
Dept: NURSING | Facility: CLINIC | Age: 37
End: 2022-04-13
Payer: COMMERCIAL
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
## 2022-04-14 NOTE — TELEPHONE ENCOUNTER
Covering pipe today..    Fyi only    Was transferred to .    Can you just done the task? Thank you

## 2022-10-21 ENCOUNTER — VIRTUAL VISIT (OUTPATIENT)
Dept: FAMILY MEDICINE | Facility: CLINIC | Age: 37
End: 2022-10-21
Payer: COMMERCIAL

## 2022-10-21 ENCOUNTER — LAB (OUTPATIENT)
Dept: FAMILY MEDICINE | Facility: CLINIC | Age: 37
End: 2022-10-21
Attending: NURSE PRACTITIONER
Payer: COMMERCIAL

## 2022-10-21 VITALS — WEIGHT: 181.6 LBS | BODY MASS INDEX: 30.93 KG/M2

## 2022-10-21 DIAGNOSIS — R05.1 ACUTE COUGH: Primary | ICD-10-CM

## 2022-10-21 DIAGNOSIS — J02.9 SORE THROAT: ICD-10-CM

## 2022-10-21 DIAGNOSIS — R05.1 ACUTE COUGH: ICD-10-CM

## 2022-10-21 LAB
DEPRECATED S PYO AG THROAT QL EIA: NEGATIVE
GROUP A STREP BY PCR: NOT DETECTED

## 2022-10-21 PROCEDURE — 99207 PR NO CHARGE LOS: CPT | Mod: 95 | Performed by: NURSE PRACTITIONER

## 2022-10-21 PROCEDURE — 87651 STREP A DNA AMP PROBE: CPT | Mod: 93 | Performed by: NURSE PRACTITIONER

## 2022-10-21 RX ORDER — MELOXICAM 15 MG/1
15 TABLET ORAL PRN
COMMUNITY
Start: 2022-10-17 | End: 2023-11-21

## 2022-10-21 NOTE — PROGRESS NOTES
"Judy is a 37 year old who is being evaluated via a billable telephone visit.      What phone number would you like to be contacted at? [phone]  How would you like to obtain your AVS? MyChart    Assessment & Plan     Acute cough  Sore throat  Patient describes symptoms of nasal congestion, sore throat and cough for the last 5 days.  She had a negative at home COVID test earlier this week.  We will test for influenza and strep along with PCR COVID test.  We will follow-up with results when available. We discussed that symptoms are likely viral and should improve in time. I recommend continuing with conservative treatment including rest, hydration and over-the-counter medications as needed.   She is advised to notify us of any persisting or worsening of symptoms.  She is comfortable with this plan.  - Symptomatic; Yes; 10/24/2022 COVID-19 Virus (Coronavirus) by PCR  - Streptococcus A Rapid Screen w/Reflex to PCR - Clinic Collect  - Influenza       BMI:   Estimated body mass index is 30.93 kg/m  as calculated from the following:    Height as of 1/7/22: 1.632 m (5' 4.25\").    Weight as of this encounter: 82.4 kg (181 lb 9.6 oz).       No follow-ups on file.    CHRIS Rutherford CNP  [facility]    Leana Kim is a 37 year old, presenting for the following health issues:  Cough      HPI     Patient is here today for telephone visit with concerns of cough, nasal congestion, sore throat.  Symptoms started 5 days ago.  She is not aware of any sick contacts.  She describes a sore throat that is difficult to get rid of no matter what over-the-counter medications or lozenges she uses.  Her cough causes some slight shortness of breath.  She did take an at home COVID test earlier this week which was negative.  She denies any fevers.  She has nasal congestion and has not noticed benefit with Sudafed.  She has been using ibuprofen and other over-the-counter medications with minimal " relief.    Review of Systems   Review of Systems - pertinent positives noted in HPI, otherwise ROS is negative.        Objective           Vitals:  No vitals were obtained today due to virtual visit.    Physical Exam   healthy, alert and no distress  PSYCH: Alert and oriented times 3; coherent speech, normal   rate and volume, able to articulate logical thoughts, able   to abstract reason, no tangential thoughts, no hallucinations   or delusions  Her affect is normal and pleasant  RESP: No cough, no audible wheezing, able to talk in full sentences  Remainder of exam unable to be completed due to telephone visits            Phone call duration: 5 minutes

## 2022-10-23 ENCOUNTER — OFFICE VISIT (OUTPATIENT)
Dept: FAMILY MEDICINE | Facility: CLINIC | Age: 37
End: 2022-10-23
Payer: COMMERCIAL

## 2022-10-23 VITALS
HEIGHT: 64 IN | DIASTOLIC BLOOD PRESSURE: 80 MMHG | BODY MASS INDEX: 30.11 KG/M2 | SYSTOLIC BLOOD PRESSURE: 130 MMHG | WEIGHT: 176.4 LBS | HEART RATE: 76 BPM | TEMPERATURE: 97.7 F | OXYGEN SATURATION: 99 %

## 2022-10-23 DIAGNOSIS — J20.9 ACUTE BRONCHITIS, UNSPECIFIED ORGANISM: Primary | ICD-10-CM

## 2022-10-23 PROCEDURE — 99213 OFFICE O/P EST LOW 20 MIN: CPT | Performed by: STUDENT IN AN ORGANIZED HEALTH CARE EDUCATION/TRAINING PROGRAM

## 2022-10-23 NOTE — PROGRESS NOTES
ASSESSMENT/PLAN:  (J20.9) Acute bronchitis, unspecified organism  (primary encounter diagnosis)  Comment: Patient with one week of cough, without fevers or myalgias or findings concerning for pneumonia. Has history of bronchitis in previous years. Exam with clear lungs and milk erythema of throat. Most consistent with bronchitis. Patient desires antibiotics, counseled that antibiotics are not recommended for treatment of bronchitis.  Plan: Counseled on typical course of bronchitis and supportive care measures, recommended cutting back on smoking.       Appropriate PPE worn.    Options for treatment and follow-up care were reviewed with the patient and/or guardian. Judy Peterson and/or guardian engaged in the decision making process and verbalized understanding of the options discussed and agreed with the final plan.    See [facility] for orders, medications, letters, patient instructions  This note was dictated with [employer].    Brandee San MD    SUBJECTIVE:  Judy Peterson is an 37 year old female who presents for cough.  - Patient concerned for bronchitis, she has had in the previous years  - Symptoms started last Saturday, cough and rhinorrhea  - Took a COVID test and was negative  - Has a history of asthma which has been under good control per patient   - Patient is a smoker, a little more than a half a pack a day smoker   - Has tried taking Mucinex, Robitussin and Sudafed; nothing has helped and cough is worse at night making it difficult to sleep     PMH:   has no past medical history on file.  Patient Active Problem List   Diagnosis     Phenylketonuria     Anxiety     Attention deficit hyperactivity disorder (ADHD)     Smoking     Mild persistent asthma without complication     Social History     Socioeconomic History     Marital status: Single   Tobacco Use     Smoking status: Every Day     Packs/day: 0.50     Types: Cigarettes     Smokeless tobacco: Never   Vaping Use     Vaping Use: Never used    Substance and Sexual Activity     Alcohol use: No     Drug use: No     Family History   Problem Relation Age of Onset     Rheumatoid Arthritis Mother        ALLERGIES:  Cefprozil    Current Outpatient Medications   Medication     albuterol (PROAIR HFA/PROVENTIL HFA/VENTOLIN HFA) 108 (90 Base) MCG/ACT inhaler     budesonide-formoterol (SYMBICORT) 160-4.5 MCG/ACT Inhaler     meloxicam (MOBIC) 15 MG tablet     montelukast (SINGULAIR) 10 MG tablet     valACYclovir (VALTREX) 1000 mg tablet     No current facility-administered medications for this visit.         ROS:  ROS is done and is negative for general/constitutional, eye, ENT, Respiratory, cardiovascular, GI, , Skin, musculoskeletal except as noted elsewhere.  All other review of systems negative except as noted elsewhere.    OBJECTIVE:  There were no vitals taken for this visit.  General: Sitting comfortably. No acute distress.   HEENT: Conjunctivae are clear without discharge or erythema.   Neck: No masses. No obvious adenopathy.  Respiratory: No respiratory distress. Lung sounds are clear without rales, ronchi, or wheezes.   Cardiac: Warm and well perfused. Brisk cap refill.  Abdominal: Abdomen is soft and non-tender.  Extremities: Upper and lower extremities grossly normal.  Skin: Skin warm without rashes.  Neurological: Motor function is grossly normal.   Psychiatric: Good insight and judgement.      RESULTS  No results found for any visits on 10/23/22.  No results found for this or any previous visit (from the past 48 hour(s)).

## 2022-10-24 ENCOUNTER — OFFICE VISIT (OUTPATIENT)
Dept: FAMILY MEDICINE | Facility: CLINIC | Age: 37
End: 2022-10-24
Payer: COMMERCIAL

## 2022-10-24 ENCOUNTER — TELEPHONE (OUTPATIENT)
Dept: FAMILY MEDICINE | Facility: CLINIC | Age: 37
End: 2022-10-24

## 2022-10-24 VITALS
HEART RATE: 96 BPM | RESPIRATION RATE: 25 BRPM | WEIGHT: 179.1 LBS | BODY MASS INDEX: 30.74 KG/M2 | OXYGEN SATURATION: 98 % | SYSTOLIC BLOOD PRESSURE: 131 MMHG | DIASTOLIC BLOOD PRESSURE: 82 MMHG

## 2022-10-24 DIAGNOSIS — J45.31 MILD PERSISTENT ASTHMA WITH ACUTE EXACERBATION: ICD-10-CM

## 2022-10-24 DIAGNOSIS — J45.31 MILD PERSISTENT ASTHMA WITH ACUTE EXACERBATION: Primary | ICD-10-CM

## 2022-10-24 DIAGNOSIS — F17.200 SMOKING: ICD-10-CM

## 2022-10-24 DIAGNOSIS — B37.31 YEAST INFECTION OF THE VAGINA: Primary | ICD-10-CM

## 2022-10-24 DIAGNOSIS — J40 BRONCHITIS: Primary | ICD-10-CM

## 2022-10-24 PROCEDURE — 99213 OFFICE O/P EST LOW 20 MIN: CPT | Performed by: NURSE PRACTITIONER

## 2022-10-24 RX ORDER — CODEINE PHOSPHATE AND GUAIFENESIN 10; 100 MG/5ML; MG/5ML
1-2 SOLUTION ORAL EVERY 6 HOURS PRN
Qty: 118 ML | Refills: 0 | Status: SHIPPED | OUTPATIENT
Start: 2022-10-24 | End: 2022-10-24

## 2022-10-24 RX ORDER — AZITHROMYCIN 250 MG/1
TABLET, FILM COATED ORAL
Qty: 6 TABLET | Refills: 0 | Status: SHIPPED | OUTPATIENT
Start: 2022-10-24 | End: 2022-10-29

## 2022-10-24 RX ORDER — PREDNISONE 20 MG/1
20 TABLET ORAL 2 TIMES DAILY
Qty: 10 TABLET | Refills: 0 | Status: SHIPPED | OUTPATIENT
Start: 2022-10-24 | End: 2022-10-29

## 2022-10-24 RX ORDER — PREDNISONE 20 MG/1
20 TABLET ORAL DAILY
Qty: 10 TABLET | Refills: 0 | Status: SHIPPED | OUTPATIENT
Start: 2022-10-24 | End: 2022-10-29

## 2022-10-24 RX ORDER — FLUCONAZOLE 150 MG/1
150 TABLET ORAL ONCE
Qty: 2 TABLET | Refills: 0 | Status: SHIPPED | OUTPATIENT
Start: 2022-10-24 | End: 2022-10-24

## 2022-10-24 RX ORDER — BENZONATATE 100 MG/1
100 CAPSULE ORAL 3 TIMES DAILY PRN
Qty: 30 CAPSULE | Refills: 0 | Status: SHIPPED | OUTPATIENT
Start: 2022-10-24 | End: 2022-12-23

## 2022-10-24 NOTE — LETTER
[facility]  [address]  [place] [address]  Phone: [phone]  Fax: [phone]    October 24, 2022        Judy Peterson  [address]  [place] [address]          To whom it may concern:    RE: Judy Peterson    Patient was seen and treated today at our clinic. She may return to work on Wednesday 10/26/22.     Please contact me for questions or concerns.      Sincerely,        CHRIS Rutherford CNP

## 2022-10-24 NOTE — PROGRESS NOTES
"  Assessment & Plan     Bronchitis  Symptoms most consistent with bronchitis.  Given length and severity of symptoms will treat with azithromycin.  We will also prescribe prednisone 20 mg twice daily for 5 days.  Tessalon Perles will be given for symptom management.  Reviewed signs and symptoms of worsening illness to monitor for and return precautions.  She is comfortable with this plan.  - azithromycin (ZITHROMAX) 250 MG tablet  Dispense: 6 tablet; Refill: 0  - predniSONE (DELTASONE) 20 MG tablet  Dispense: 10 tablet; Refill: 0  - benzonatate (TESSALON) 100 MG capsule  Dispense: 30 capsule; Refill: 0    Mild persistent asthma with acute exacerbation  As above, will treat with prednisone 20 mg twice daily for 5 days.  She is advised to monitor breathing and notify us of any worsening symptoms.    Smoking  She is smoking between half a pack and 1 pack/day.  Encourage cessation.  She will notify us if she is interested in resources to help with this.       BMI:   Estimated body mass index is 30.74 kg/m  as calculated from the following:    Height as of 10/23/22: 1.626 m (5' 4\").    Weight as of this encounter: 81.2 kg (179 lb 1.6 oz).       No follow-ups on file.    CHRIS Rutherford CNP  [facility] PRAMOD Kim is a 37 year old, presenting for the following health issues:  Bronchiectasis (possible sinus infection/bronchitis - started about 2-weeks ago-  Patient needs doctor note for work )      HPI     Patient is here today to follow-up on concerns of respiratory infection.  She was seen virtually last week with concerns of sore throat, nasal congestion and cough that was lasting over a week.  Today she reports no relief in her symptoms despite using several over-the-counter medications.  She has had a negative strep test, 2 negative COVID test.  She continues to have sinus pressure and headaches along with productive cough and wheezing.  She does smoke daily between half a pack " and 1 pack/day and has a diagnosis of intermittent asthma.  She denies any significant shortness of breath or difficulty breathing.  Typically when she gets sick similar to this she requires antibiotics and steroid treatment.  She denies fevers, chills body aches or other systemic symptoms at this time.  She has missed work today and is requesting a work note.    Review of Systems   Review of Systems - pertinent positives noted in HPI, otherwise ROS is negative.        Objective    /82 (BP Location: Right arm, Patient Position: Sitting, Cuff Size: Adult Regular)   Pulse 96   Resp 25   Wt 81.2 kg (179 lb 1.6 oz)   SpO2 98%   BMI 30.74 kg/m    Body mass index is 30.74 kg/m .  Physical Exam       General Appearance:  Alert, cooperative, no distress, appears stated age   HEENT:   Moist mucous membranes, posterior oropharynx with clear postnasal drainage present.  Mild erythema noted.  Tympanic membranes and ear canals appear normal bilaterally.  Sinus tenderness noted.   Neck: Supple, symmetrical, no adenopathy.   Lungs:   Clear to auscultation bilaterally, respirations unlabored.  Mild expiratory wheeze, no inspiratory crackles noted.   Heart:  Regular rate and rhythm, S1, S2 normal, no murmur, rub or gallop   Skin: Warm, dry.  Skin color, texture, turgor normal.

## 2022-10-30 ENCOUNTER — HEALTH MAINTENANCE LETTER (OUTPATIENT)
Age: 37
End: 2022-10-30

## 2022-11-11 DIAGNOSIS — B00.9 HERPES SIMPLEX TYPE 1 INFECTION: ICD-10-CM

## 2022-11-12 NOTE — TELEPHONE ENCOUNTER
"Routing refill request to provider for review/approval because:  Labs not current:  cr    Last Written Prescription Date:  11/5/21  Last Fill Quantity: 4,  # refills: 11   Last office visit provider:  10/24/22     Requested Prescriptions   Pending Prescriptions Disp Refills     valACYclovir (VALTREX) 1000 mg tablet [Pharmacy Med Name: VALACYCLOVIR HCL 1GM TABS] 4 tablet 11     Sig: TAKE TWO TABLETS BY MOUTH TWICE A DAY       Antivirals for Herpes Protocol Failed - 11/11/2022  7:15 AM        Failed - Normal serum creatinine on file in past 12 months     Recent Labs   Lab Test 11/05/21  1120   CR 0.73       Ok to refill medication if creatinine is low          Passed - Patient is age 12 or older        Passed - Recent (12 mo) or future (30 days) visit within the authorizing provider's specialty     Patient has had an office visit with the authorizing provider or a provider within the authorizing providers department within the previous 12 mos or has a future within next 30 days. See \"Patient Info\" tab in inbasket, or \"Choose Columns\" in Meds & Orders section of the refill encounter.              Passed - Medication is active on med list             Kyleigh Hay RN 11/12/22 3:21 PM  "

## 2022-11-14 RX ORDER — VALACYCLOVIR HYDROCHLORIDE 1 G/1
TABLET, FILM COATED ORAL
Qty: 4 TABLET | Refills: 11 | Status: SHIPPED | OUTPATIENT
Start: 2022-11-14 | End: 2024-01-08

## 2022-11-23 DIAGNOSIS — L65.9 HAIR LOSS: Primary | ICD-10-CM

## 2022-12-17 ENCOUNTER — HEALTH MAINTENANCE LETTER (OUTPATIENT)
Age: 37
End: 2022-12-17

## 2022-12-22 ENCOUNTER — TRANSFERRED RECORDS (OUTPATIENT)
Dept: HEALTH INFORMATION MANAGEMENT | Facility: CLINIC | Age: 37
End: 2022-12-22

## 2022-12-23 ENCOUNTER — OFFICE VISIT (OUTPATIENT)
Dept: FAMILY MEDICINE | Facility: CLINIC | Age: 37
End: 2022-12-23
Payer: COMMERCIAL

## 2022-12-23 VITALS
TEMPERATURE: 98.5 F | HEART RATE: 82 BPM | BODY MASS INDEX: 31.58 KG/M2 | RESPIRATION RATE: 18 BRPM | WEIGHT: 184 LBS | SYSTOLIC BLOOD PRESSURE: 121 MMHG | DIASTOLIC BLOOD PRESSURE: 78 MMHG | OXYGEN SATURATION: 99 %

## 2022-12-23 DIAGNOSIS — J01.90 ACUTE SINUSITIS WITH SYMPTOMS > 10 DAYS: Primary | ICD-10-CM

## 2022-12-23 PROCEDURE — 99213 OFFICE O/P EST LOW 20 MIN: CPT | Performed by: EMERGENCY MEDICINE

## 2022-12-23 RX ORDER — AMOXICILLIN AND CLAVULANATE POTASSIUM 500; 125 MG/1; MG/1
1 TABLET, FILM COATED ORAL 3 TIMES DAILY
Qty: 15 TABLET | Refills: 0 | Status: SHIPPED | OUTPATIENT
Start: 2022-12-23 | End: 2022-12-28

## 2022-12-23 NOTE — PROGRESS NOTES
Impression:  Subacute sinusitis with symptoms lasting greater than 10 days    Plan:  Augmentin for 5 days, continue decongestants as needed, Tylenol for pain, follow-up if not improved      Chief Complaint:  Patient presents with:  Sinus Problem: Facial pain and pressure x 2 months          HPI:   Judy Peterson is a 37 year old female who presents to this clinic for the evaluation of sinus pressure.  Patient has had nasal discharge and sinus pressure for the past 2 months.  It is worse in the right maxillary area.  She has purulent nasal discharge.  No sore throat or difficulty swallowing.  Does complain of some phlegm in the back of her throat.  No chest pain cough or shortness of breath.  No fever or chills.  No neurological changes      PMH:   No past medical history on file.  Past Surgical History:   Procedure Laterality Date     HERNIA REPAIR Right          ROS:  All other systems negative    Meds:    Current Outpatient Medications:      albuterol (PROAIR HFA/PROVENTIL HFA/VENTOLIN HFA) 108 (90 Base) MCG/ACT inhaler, Inhale 2 puffs into the lungs every 4 hours as needed for shortness of breath / dyspnea or wheezing, Disp: 18 g, Rfl: 1     budesonide-formoterol (SYMBICORT) 160-4.5 MCG/ACT Inhaler, Inhale 2 puffs into the lungs 2 times daily, Disp: 10.2 g, Rfl: 11     meloxicam (MOBIC) 15 MG tablet, Take 15 mg by mouth as needed, Disp: , Rfl:      montelukast (SINGULAIR) 10 MG tablet, Take 1 tablet (10 mg) by mouth At Bedtime, Disp: 90 tablet, Rfl: 3     valACYclovir (VALTREX) 1000 mg tablet, TAKE TWO TABLETS BY MOUTH TWICE A DAY, Disp: 4 tablet, Rfl: 11     amoxicillin-clavulanate (AUGMENTIN) 500-125 MG tablet, Take 1 tablet by mouth 3 times daily for 5 days, Disp: 15 tablet, Rfl: 0        Social:  Social History     Socioeconomic History     Marital status: Single     Spouse name: Not on file     Number of children: Not on file     Years of education: Not on file     Highest education level: Not on file    Occupational History     Not on file   Tobacco Use     Smoking status: Every Day     Packs/day: 0.50     Types: Cigarettes     Smokeless tobacco: Never   Vaping Use     Vaping Use: Never used   Substance and Sexual Activity     Alcohol use: No     Drug use: No     Sexual activity: Not on file   Other Topics Concern     Not on file   Social History Narrative     Not on file     Social Determinants of Health     Financial Resource Strain: Not on file   Food Insecurity: Not on file   Transportation Needs: Not on file   Physical Activity: Not on file   Stress: Not on file   Social Connections: Not on file   Intimate Partner Violence: Not on file   Housing Stability: Not on file         Physical Exam:  Vitals:    12/23/22 1523   BP: 121/78   BP Location: Right arm   Patient Position: Sitting   Cuff Size: Adult Large   Pulse: 82   Resp: 18   Temp: 98.5  F (36.9  C)   TempSrc: Oral   SpO2: 99%   Weight: 83.5 kg (184 lb)      Patient is awake, alert, no distress  Eyes: PERRL, EOMI  Head: Atraumatic and normocephalic, the right maxillary sinus is tender to percussion  Pharynx: Erythema, no exudate, airway patent  Skin: No lesions or rash  Neuro: Normal motor and sensory function in all extremities  Psych: Awake, alert, normally responsive      Results:    No results found for this or any previous visit (from the past 24 hour(s)).           Arian Verde MD

## 2022-12-25 ENCOUNTER — HOSPITAL ENCOUNTER (EMERGENCY)
Facility: HOSPITAL | Age: 37
Discharge: HOME | End: 2022-12-25
Attending: EMERGENCY MEDICINE | Admitting: EMERGENCY MEDICINE
Payer: COMMERCIAL

## 2022-12-25 ENCOUNTER — NURSE TRIAGE (OUTPATIENT)
Dept: NURSING | Facility: CLINIC | Age: 37
End: 2022-12-25

## 2022-12-25 VITALS
OXYGEN SATURATION: 98 % | TEMPERATURE: 98.5 F | RESPIRATION RATE: 18 BRPM | BODY MASS INDEX: 30.56 KG/M2 | SYSTOLIC BLOOD PRESSURE: 148 MMHG | DIASTOLIC BLOOD PRESSURE: 82 MMHG | WEIGHT: 179 LBS | HEART RATE: 78 BPM | HEIGHT: 64 IN

## 2022-12-25 DIAGNOSIS — K02.9 DENTAL CARIES: ICD-10-CM

## 2022-12-25 DIAGNOSIS — K04.7 DENTAL ABSCESS: ICD-10-CM

## 2022-12-25 PROCEDURE — 99283 EMERGENCY DEPT VISIT LOW MDM: CPT | Mod: 25

## 2022-12-25 PROCEDURE — 41800 DRAINAGE OF GUM LESION: CPT

## 2022-12-25 RX ORDER — CHLORHEXIDINE GLUCONATE ORAL RINSE 1.2 MG/ML
15 SOLUTION DENTAL 2 TIMES DAILY
Qty: 473 ML | Refills: 0 | Status: SHIPPED | OUTPATIENT
Start: 2022-12-25 | End: 2023-01-06

## 2022-12-25 NOTE — ED TRIAGE NOTES
"    patient noted right facial swelling ( above mouth to nose) 2 days ago.  Went to  2 days ago, was given Amoxicillin for sinus infection.  Now has increased swelling,\"?maybe an abscess/infection\".  "

## 2022-12-25 NOTE — TELEPHONE ENCOUNTER
Patient seen at walk in clinic on Friday about a sinus infection. Patient has pressure that pushes down on her teeth and gums. Patient has a blister on the front right maxillary on the upper gums under the lip. The blister has a little white spot at the top of it. Pain rating 9/10. Patient is alternating tylenol and ibuprofen.   All the symptoms unchanged except for increased pain at this blister and it looks like it is coming to a head.  Protocol recommends See HCP within 4 hours  Page to on call provider Dr. Faith Saldana stating that this is concerning for a possible abscess which would need to be drained. Patient should be evaluated tonight.   Provider Recommendation Follow Up:   Reached patient/caregiver. Informed of provider's recommendations. Patient verbalized understanding and agrees with the plan.   Patient will present to [facility] ED  Geneva Obrien RN   12/25/22 5:17 PM  [facility] Nurse Advisor        Reason for Disposition    [1] SEVERE sinus pain AND [2] not improved 2 hours after pain medicine    Additional Information    Negative: SEVERE difficulty breathing (e.g., struggling for each breath, speaks in single words)    Negative: Sounds like a life-threatening emergency to the triager    Negative: [1] Difficulty breathing AND [2] not from stuffy nose (e.g., not relieved by cleaning out the nose)    Negative: [1] SEVERE headache AND [2] fever    Negative: [1] Taking antibiotic > 24 hours AND [2] fever > 103 F (39.4 C)    Negative: [1] Redness or swelling on the cheek, forehead or around the eye AND [2] fever    Negative: Patient sounds very sick or weak to the triager    Protocols used: SINUS INFECTION ON ANTIBIOTIC FOLLOW-UP CALL-A-

## 2022-12-25 NOTE — ED PROVIDER NOTES
EMERGENCY DEPARTMENT ENCOUNTER      NAME: Judy Peterson  AGE: 37 year old female  YOB: 1985  MRN: [id number]  EVALUATION DATE & TIME: 12/25/2022  5:49 PM    PCP: Luz Carrasco    ED PROVIDER: Kelly Benítez MD    Chief Complaint   Patient presents with     Mouth Problem         FINAL IMPRESSION:  1. Dental abscess    2. Dental caries          ED COURSE & MEDICAL DECISION MAKING:    Pertinent Labs & Imaging studies reviewed. (See chart for details)  37 year old female with history of asthma, ADHD and anxiety who presents to the Emergency Department for evaluation of 2 days of pain to the right sinus region.  No new swelling despite being on Augmentin for sinus infection.  On exam she has obvious dental abscess with dental caries.  No signs of facial cellulitis, Gerald's angina.    Abscess incised and drained.  Will extend her Augmentin, prescribed chlorhexidine mouthwash.  Encouraged her to follow-up with dentist and, and warning signs return to ED discussed.         Medical Decision Making    History:    Supplemental history from: Documented in HPI, if applicable    External Record(s) reviewed: Outpatient Record: [facility]    Work Up:    Chart documentation includes differential considered and any EKGs or imaging independently interpreted by provider.    In additional to work up documented, I considered the following work up: See chart documentation, if applicable.    External consultation:    Discussion of management with another provider: See chart documentation, if applicable    Complicating factors:    Care impacted by chronic illness: Mental health, asthma    Care affected by social determinants of health: N/A    Disposition considerations: Discharge. I prescribed additional prescription strength medication(s) as charted. N/A.    5:57 PM I met the patient and performed my initial interview and exam.      At the conclusion of the encounter I discussed the results of all of the tests and  "the disposition. The questions were answered. The patient or family acknowledged understanding and was agreeable with the care plan.      MEDICATIONS GIVEN IN THE EMERGENCY:  Medications - No data to display    NEW PRESCRIPTIONS STARTED AT TODAY'S ER VISIT  New Prescriptions    AMOXICILLIN-CLAVULANATE (AUGMENTIN) 875-125 MG TABLET    Take 1 tablet by mouth 3 times daily for 5 days    CHLORHEXIDINE (PERIDEX) 0.12 % SOLUTION    Swish and spit 15 mLs in mouth 2 times daily          =================================================================    HPI    Patient information was obtained from: Patient    Use of Intrepreter: N/A      Judy Peterson is a 37 year old female with pertinent medical history of smoking who presents with dental abscess.     Per chart review: Patient was seen at [facility] on 12/23 for evaluation of sinusitis. Was started on Augmentin 500 mg for five days.     Patient reports that she had facial and felt like she had a sinus infection and was seen on two days ago and give 500 mg of Augmentin which she takes 3 times a day. Has taken 8 total and has six left. She notes having a bump on the top right of her gums and feels pain at the roof of her mouth. Denies dental pain. Notes she saw a dentist two months ago who pulled some teeth. Patient denies any other complaints at this time.       REVIEW OF SYSTEMS  Constitutional:  Denies fever, chills, weight loss or weakness  HENT: Positive for facial swelling with pain. Has a \"bump\" to gums. Denies sore throat, ear pain, congestion  Skin:  Denies rash, pallor  Neurologic:  Denies headache, focal weakness or sensory changes  Lymph: Denies swollen nodes    All other systems negative unless noted in HPI.      PAST MEDICAL HISTORY:  History reviewed. No pertinent past medical history.    PAST SURGICAL HISTORY:  Past Surgical History:   Procedure Laterality Date     HERNIA REPAIR Right        CURRENT MEDICATIONS:    Prior to Admission Medications " "  Prescriptions Last Dose Informant Patient Reported? Taking?   albuterol (PROAIR HFA/PROVENTIL HFA/VENTOLIN HFA) 108 (90 Base) MCG/ACT inhaler   No No   Sig: Inhale 2 puffs into the lungs every 4 hours as needed for shortness of breath / dyspnea or wheezing   amoxicillin-clavulanate (AUGMENTIN) 500-125 MG tablet   No No   Sig: Take 1 tablet by mouth 3 times daily for 5 days   budesonide-formoterol (SYMBICORT) 160-4.5 MCG/ACT Inhaler   No No   Sig: Inhale 2 puffs into the lungs 2 times daily   meloxicam (MOBIC) 15 MG tablet   Yes No   Sig: Take 15 mg by mouth as needed   montelukast (SINGULAIR) 10 MG tablet   No No   Sig: Take 1 tablet (10 mg) by mouth At Bedtime   valACYclovir (VALTREX) 1000 mg tablet   No No   Sig: TAKE TWO TABLETS BY MOUTH TWICE A DAY      Facility-Administered Medications: None       ALLERGIES:  Allergies   Allergen Reactions     Cefprozil Hives       FAMILY HISTORY:  Family History   Problem Relation Age of Onset     Rheumatoid Arthritis Mother        SOCIAL HISTORY:  Social History     Tobacco Use     Smoking status: Every Day     Packs/day: 0.50     Types: Cigarettes     Smokeless tobacco: Never   Vaping Use     Vaping Use: Never used   Substance Use Topics     Alcohol use: No     Drug use: No        VITALS:  Patient Vitals for the past 24 hrs:   BP Temp Temp src Pulse Resp SpO2 Height Weight   12/25/22 1746 (!) 157/97 98.5  F (36.9  C) Temporal 90 18 100 % 1.632 m (5' 4.25\") 81.2 kg (179 lb)       PHYSICAL EXAM    General Appearance: Well-appearing, well-nourished, no acute distress   Head:  Normocephalic  Eyes:  conjunctiva/corneas clear  ENT:  Lips, mucosa, and tongue normal; membranes are moist without pallor. Mild facial swelling to right nasal/maxillary area. Oropharynx with poor dentition overall. Right lateral maxillary incisor grayish and dusky. Pain to palpation of gingival with obvious palpable abscess.  Floor the mouth is soft  Neck:  Supple, no adenopathy  Cardio:  Regular rate " and rhythm  Pulm:  No respiratory distress  Extremities: Moves all extremities normally, normal gait  Skin:  Skin warm, dry, no rashes  Neuro:  Alert and oriented ×3, moving all extremities, no gross sensory defects     RADIOLOGY/LABS:  Reviewed all pertinent imaging. Please see official radiology report. All pertinent labs reviewed and interpreted.         PROCEDURES:  PROCEDURE: Incision and Drainage   INDICATIONS: Localized abscess   PROCEDURE PROVIDER: Dr Kelly Benítez   SITE: Right gingival    MEDICATION: 2 mLs of 2% Lidocaine with epinephrine   NOTE:  Local anesthetic was injected subcutaneously with anesthesia effects demonstrated prior to proceeding.  The area of maximal fluctuance was opened with a # 11 Blade ([place]) using an Eliptical incision to allow for drainage.  The abscess was drained.  The abscess cavity was bluntly explored to separate any loculations. No Packing was placed into the abscess cavity.  A sterile dressing was placed over the area.   COMPLEXITY: Simple    Simple = single, furuncle, paronychia, superficial  Complex = multiple or abscess requiring probing, loculations, packing placement   COMPLICATIONS: Patient tolerated procedure well, without complication        The creation of this record is based on the scribe s observations of the work being performed by Kelly Benítez MD and the provider s statements to them. It was created on her behalf by Seth Carolina, a trained medical scribe. This document has been checked and approved by the attending provider.    Kelly Benítez MD  Emergency Medicine  [facility] EMERGENCY DEPARTMENT  [facility] [facility] [address]  [phone]  Dept: [phone]     Kelly Benítez MD  12/25/22 6285

## 2022-12-26 NOTE — DISCHARGE INSTRUCTIONS
Continue the Augmentin 3 times a day.  I have written you for 5 additional days for total of a 10-day course.  Chlorhexidine mouthwash as prescribed.  Call dentist for follow-up appointment.  Return to the ER for the warning signs discussed.

## 2023-01-06 ENCOUNTER — OFFICE VISIT (OUTPATIENT)
Dept: FAMILY MEDICINE | Facility: CLINIC | Age: 38
End: 2023-01-06
Payer: COMMERCIAL

## 2023-01-06 VITALS
BODY MASS INDEX: 30.49 KG/M2 | HEART RATE: 79 BPM | TEMPERATURE: 98 F | WEIGHT: 179 LBS | SYSTOLIC BLOOD PRESSURE: 130 MMHG | DIASTOLIC BLOOD PRESSURE: 70 MMHG | OXYGEN SATURATION: 99 %

## 2023-01-06 DIAGNOSIS — F17.200 SMOKING: ICD-10-CM

## 2023-01-06 DIAGNOSIS — J45.41 MODERATE PERSISTENT ASTHMA WITH EXACERBATION: ICD-10-CM

## 2023-01-06 DIAGNOSIS — J45.30 MILD PERSISTENT ASTHMA WITHOUT COMPLICATION: ICD-10-CM

## 2023-01-06 DIAGNOSIS — L65.9 HAIR LOSS: Primary | ICD-10-CM

## 2023-01-06 DIAGNOSIS — E70.1 PHENYLKETONURIA (PKU) (H): ICD-10-CM

## 2023-01-06 LAB
ERYTHROCYTE [SEDIMENTATION RATE] IN BLOOD BY WESTERGREN METHOD: 7 MM/HR (ref 0–20)
FERRITIN SERPL-MCNC: 70 NG/ML (ref 6–175)
IRON BINDING CAPACITY (ROCHE): 293 UG/DL (ref 240–430)
IRON SATN MFR SERPL: 28 % (ref 15–46)
IRON SERPL-MCNC: 82 UG/DL (ref 37–145)
T4 FREE SERPL-MCNC: 1.21 NG/DL (ref 0.9–1.7)
TSH SERPL DL<=0.005 MIU/L-ACNC: 1.24 UIU/ML (ref 0.3–4.2)

## 2023-01-06 PROCEDURE — 83540 ASSAY OF IRON: CPT | Performed by: NURSE PRACTITIONER

## 2023-01-06 PROCEDURE — 84439 ASSAY OF FREE THYROXINE: CPT | Performed by: NURSE PRACTITIONER

## 2023-01-06 PROCEDURE — 85652 RBC SED RATE AUTOMATED: CPT | Performed by: NURSE PRACTITIONER

## 2023-01-06 PROCEDURE — 99214 OFFICE O/P EST MOD 30 MIN: CPT | Performed by: NURSE PRACTITIONER

## 2023-01-06 PROCEDURE — 36415 COLL VENOUS BLD VENIPUNCTURE: CPT | Performed by: NURSE PRACTITIONER

## 2023-01-06 PROCEDURE — 86038 ANTINUCLEAR ANTIBODIES: CPT | Performed by: NURSE PRACTITIONER

## 2023-01-06 PROCEDURE — 86039 ANTINUCLEAR ANTIBODIES (ANA): CPT | Performed by: NURSE PRACTITIONER

## 2023-01-06 PROCEDURE — 84443 ASSAY THYROID STIM HORMONE: CPT | Performed by: NURSE PRACTITIONER

## 2023-01-06 PROCEDURE — 82728 ASSAY OF FERRITIN: CPT | Performed by: NURSE PRACTITIONER

## 2023-01-06 PROCEDURE — 86431 RHEUMATOID FACTOR QUANT: CPT | Performed by: NURSE PRACTITIONER

## 2023-01-06 PROCEDURE — 83550 IRON BINDING TEST: CPT | Performed by: NURSE PRACTITIONER

## 2023-01-06 RX ORDER — KETOCONAZOLE 20 MG/ML
SHAMPOO TOPICAL
COMMUNITY
Start: 2022-12-20 | End: 2023-11-21

## 2023-01-06 RX ORDER — BUDESONIDE AND FORMOTEROL FUMARATE DIHYDRATE 160; 4.5 UG/1; UG/1
2 AEROSOL RESPIRATORY (INHALATION) 2 TIMES DAILY
Qty: 10.2 G | Refills: 11 | Status: SHIPPED | OUTPATIENT
Start: 2023-01-06 | End: 2024-03-11

## 2023-01-06 RX ORDER — MONTELUKAST SODIUM 10 MG/1
10 TABLET ORAL AT BEDTIME
Qty: 90 TABLET | Refills: 3 | Status: SHIPPED | OUTPATIENT
Start: 2023-01-06 | End: 2024-01-08

## 2023-01-06 RX ORDER — SPIRONOLACTONE 50 MG/1
TABLET, FILM COATED ORAL
COMMUNITY
Start: 2022-12-27 | End: 2024-01-08

## 2023-01-06 ASSESSMENT — PAIN SCALES - GENERAL: PAINLEVEL: NO PAIN (0)

## 2023-01-06 NOTE — PROGRESS NOTES
Assessment and Plan:     Hair loss  Will rule out underlying autoimmune disease including thyroid disease, lupus, rheumatoid arthritis.  We will also rule out iron deficiency anemia as patient limits her protein intake.  Further plans pending the results.  - TSH  - T4, free  - Anti Nuclear Disha IgG by IFA with Reflex  - Rheumatoid factor  - ESR: Erythrocyte sedimentation rate  - Ferritin  - Iron and iron binding capacity  - TSH  - T4, free  - Anti Nuclear Disha IgG by IFA with Reflex  - Rheumatoid factor  - ESR: Erythrocyte sedimentation rate  - Ferritin  - Iron and iron binding capacity    Phenylketonuria (PKU) (H)  Will refer to neurology for further evaluation.  May consider referral to dietitian.  - Adult Neurology  Referral    Mild persistent asthma without complication  She denies recent flares.  She continues montelukast and Symbicort daily.  She continues albuterol as needed.    Smoking  I encourage smoking cessation.        Subjective:     Judy is a 37 year old female presenting to the clinic for concerns for hair loss.  Patient has had gradual thinning of hair over multiple years.  She is concerned that it is caused by the Nexplanon.  She has been seeing dermatology where she is prescribed a vitamin supplement and a medicated shampoo.  Patient was evaluated last year where she had a mildly elevated rheumatoid factor.  She has not seen rheumatology.  She has been diagnosed with osteoarthritis within her hips.  She denies any joint pain otherwise.  She does experience intermittent fatigue.  She has not had any unusual rashes.  She did have COVID in May.  Patient has phenylketonuria.  She saw neurology in the past.  Patient does try to limit her protein intake, but does not follow a strict diet.  She does continue to smoke.  She has a history of mild persistent asthma and denies recent flares.  She takes montelukast daily.  She also uses Symbicort daily and albuterol as needed.    Reviewof Systems: A  complete 14 point review of systems was obtained and is negative or as stated in the history of present illness.    Social History     Socioeconomic History     Marital status: Single     Spouse name: Not on file     Number of children: Not on file     Years of education: Not on file     Highest education level: Not on file   Occupational History     Not on file   Tobacco Use     Smoking status: Every Day     Packs/day: 0.50     Types: Cigarettes     Smokeless tobacco: Never   Vaping Use     Vaping Use: Never used   Substance and Sexual Activity     Alcohol use: No     Drug use: No     Sexual activity: Not on file   Other Topics Concern     Not on file   Social History Narrative     Not on file     Social Determinants of Health     Financial Resource Strain: Not on file   Food Insecurity: Not on file   Transportation Needs: Not on file   Physical Activity: Not on file   Stress: Not on file   Social Connections: Not on file   Intimate Partner Violence: Not on file   Housing Stability: Not on file       Active Ambulatory Problems     Diagnosis Date Noted     Phenylketonuria      Anxiety      Attention deficit hyperactivity disorder (ADHD)      Smoking 10/04/2016     Mild persistent asthma without complication 07/31/2020     Resolved Ambulatory Problems     Diagnosis Date Noted     No Resolved Ambulatory Problems     Past Medical History:   Diagnosis Date     Anxiety      Uncomplicated asthma        Family History   Problem Relation Age of Onset     Rheumatoid Arthritis Mother        Objective:     /70   Pulse 79   Temp 98  F (36.7  C) (Oral)   Wt 81.2 kg (179 lb)   LMP 12/16/2022   SpO2 99%   BMI 30.49 kg/m      Patient is alert, in no obvious distress.   Skin: Warm, dry.  Significant thinning of the hair of the scalp noted.  No patches of hair loss are present.  Neck: Supple, no lymphadenopathy. No thyromegaly.  Lungs:  Clear to auscultation. Respirations even and unlabored.  No wheezing or rales noted.    Heart:  Regular rate and rhythm.  No murmurs, S3, S4, gallops, or rubs.

## 2023-01-09 DIAGNOSIS — R76.8 ELEVATED RHEUMATOID FACTOR: Primary | ICD-10-CM

## 2023-01-09 LAB
ANA PAT SER IF-IMP: ABNORMAL
ANA SER QL IF: ABNORMAL
ANA TITR SER IF: ABNORMAL {TITER}
RHEUMATOID FACT SER NEPH-ACNC: 28 IU/ML

## 2023-01-12 ENCOUNTER — TELEPHONE (OUTPATIENT)
Dept: FAMILY MEDICINE | Facility: CLINIC | Age: 38
End: 2023-01-12

## 2023-01-12 NOTE — TELEPHONE ENCOUNTER
Patient Returning Call    Reason for call:  Call back regarding rheumatology referral    Patient has additional questions:  Yes    What are your questions/concerns:  Patient would like call back from Luz Danilo specifically.  She has questions/concerns regarding her rheumatology referral she was given and would like to discuss this with Luz only.  Declined to give additional information to writer at time of call.    Could we send this information to you in "[employer] or would you prefer to receive a phone call?:   Patient would prefer a phone call   Okay to leave a detailed message?: Yes at Home number on file [phone] (home)

## 2023-01-13 NOTE — TELEPHONE ENCOUNTER
I called and spoke to the patient. She couldn't get in to see rheumatology through [place] until August.  She will call her insurance to see if other health care systems are covered.  Thanks.

## 2023-01-25 DIAGNOSIS — Z79.899 MEDICATION MANAGEMENT: Primary | ICD-10-CM

## 2023-01-25 DIAGNOSIS — B00.9 HERPES SIMPLEX TYPE 1 INFECTION: Primary | ICD-10-CM

## 2023-01-25 RX ORDER — VALACYCLOVIR HYDROCHLORIDE 500 MG/1
500 TABLET, FILM COATED ORAL DAILY
Qty: 30 TABLET | Refills: 0 | Status: SHIPPED | OUTPATIENT
Start: 2023-01-25 | End: 2023-04-04

## 2023-02-01 ENCOUNTER — MEDICAL CORRESPONDENCE (OUTPATIENT)
Dept: HEALTH INFORMATION MANAGEMENT | Facility: CLINIC | Age: 38
End: 2023-02-01

## 2023-02-01 ENCOUNTER — TRANSFERRED RECORDS (OUTPATIENT)
Dept: HEALTH INFORMATION MANAGEMENT | Facility: CLINIC | Age: 38
End: 2023-02-01

## 2023-02-08 ENCOUNTER — LAB REQUISITION (OUTPATIENT)
Dept: LAB | Facility: CLINIC | Age: 38
End: 2023-02-08
Payer: COMMERCIAL

## 2023-02-08 DIAGNOSIS — M25.551 PAIN IN RIGHT HIP: ICD-10-CM

## 2023-02-08 LAB
B BURGDOR IGG+IGM SER QL: 0.06
BASOPHILS # BLD AUTO: 0.1 10E3/UL (ref 0–0.2)
BASOPHILS NFR BLD AUTO: 1 %
C REACTIVE PROTEIN LHE: 0.8 MG/DL (ref 0–?)
EOSINOPHIL # BLD AUTO: 0.1 10E3/UL (ref 0–0.7)
EOSINOPHIL NFR BLD AUTO: 1 %
ERYTHROCYTE [SEDIMENTATION RATE] IN BLOOD BY WESTERGREN METHOD: 12 MM/HR (ref 0–20)
IMM GRANULOCYTES # BLD: 0 10E3/UL
IMM GRANULOCYTES NFR BLD: 0 %
LYMPHOCYTES # BLD AUTO: 2.3 10E3/UL (ref 0.8–5.3)
LYMPHOCYTES NFR BLD AUTO: 24 %
MONOCYTES # BLD AUTO: 0.7 10E3/UL (ref 0–1.3)
MONOCYTES NFR BLD AUTO: 7 %
NEUTROPHILS # BLD AUTO: 6.6 10E3/UL (ref 1.6–8.3)
NEUTROPHILS NFR BLD AUTO: 67 %
NRBC # BLD AUTO: 0 10E3/UL
NRBC BLD AUTO-RTO: 0 /100
T3 SERPL-MCNC: 162 NG/DL (ref 85–202)
T4 FREE SERPL-MCNC: 1.27 NG/DL (ref 0.9–1.7)
T4 SERPL-MCNC: 8.9 UG/DL (ref 4.5–11.7)
TSH SERPL DL<=0.005 MIU/L-ACNC: 1.03 UIU/ML (ref 0.3–5)
URATE SERPL-MCNC: 5.3 MG/DL (ref 2–7.5)
WBC # BLD AUTO: 9.9 10E3/UL (ref 4–11)

## 2023-02-08 PROCEDURE — 84550 ASSAY OF BLOOD/URIC ACID: CPT | Mod: ORL | Performed by: ORTHOPAEDIC SURGERY

## 2023-02-08 PROCEDURE — 86618 LYME DISEASE ANTIBODY: CPT | Mod: ORL | Performed by: ORTHOPAEDIC SURGERY

## 2023-02-08 PROCEDURE — 86038 ANTINUCLEAR ANTIBODIES: CPT | Mod: ORL | Performed by: ORTHOPAEDIC SURGERY

## 2023-02-08 PROCEDURE — 86200 CCP ANTIBODY: CPT | Performed by: PHYSICIAN ASSISTANT

## 2023-02-08 PROCEDURE — 84439 ASSAY OF FREE THYROXINE: CPT | Mod: ORL | Performed by: ORTHOPAEDIC SURGERY

## 2023-02-08 PROCEDURE — 85048 AUTOMATED LEUKOCYTE COUNT: CPT | Mod: ORL | Performed by: ORTHOPAEDIC SURGERY

## 2023-02-08 PROCEDURE — 84443 ASSAY THYROID STIM HORMONE: CPT | Mod: ORL | Performed by: ORTHOPAEDIC SURGERY

## 2023-02-08 PROCEDURE — 86140 C-REACTIVE PROTEIN: CPT | Mod: ORL | Performed by: ORTHOPAEDIC SURGERY

## 2023-02-08 PROCEDURE — 86431 RHEUMATOID FACTOR QUANT: CPT | Mod: ORL | Performed by: ORTHOPAEDIC SURGERY

## 2023-02-08 PROCEDURE — 84480 ASSAY TRIIODOTHYRONINE (T3): CPT | Mod: ORL | Performed by: ORTHOPAEDIC SURGERY

## 2023-02-08 PROCEDURE — 85652 RBC SED RATE AUTOMATED: CPT | Mod: ORL | Performed by: ORTHOPAEDIC SURGERY

## 2023-02-08 PROCEDURE — 84436 ASSAY OF TOTAL THYROXINE: CPT | Mod: ORL | Performed by: ORTHOPAEDIC SURGERY

## 2023-02-08 PROCEDURE — 36415 COLL VENOUS BLD VENIPUNCTURE: CPT | Mod: ORL | Performed by: ORTHOPAEDIC SURGERY

## 2023-02-09 DIAGNOSIS — M16.0 BILATERAL HIP JOINT ARTHRITIS: Primary | ICD-10-CM

## 2023-02-09 LAB
ANA PAT SER IF-IMP: ABNORMAL
ANA SER QL IF: ABNORMAL
ANA TITR SER IF: ABNORMAL {TITER}
RHEUMATOID FACT SER NEPH-ACNC: 25 IU/ML

## 2023-02-13 LAB — CCP AB SER IA-ACNC: 1.8 U/ML

## 2023-02-20 ENCOUNTER — TRANSFERRED RECORDS (OUTPATIENT)
Dept: HEALTH INFORMATION MANAGEMENT | Facility: CLINIC | Age: 38
End: 2023-02-20

## 2023-04-04 DIAGNOSIS — B00.9 HERPES SIMPLEX TYPE 1 INFECTION: ICD-10-CM

## 2023-04-04 RX ORDER — VALACYCLOVIR HYDROCHLORIDE 500 MG/1
TABLET, FILM COATED ORAL
Qty: 30 TABLET | Refills: 0 | Status: SHIPPED | OUTPATIENT
Start: 2023-04-04 | End: 2023-11-14

## 2023-04-04 NOTE — TELEPHONE ENCOUNTER
"Routing refill request to provider for review/approval because:  Labs not current:  creatinine    Last Written Prescription Date:  1/25/23  Last Fill Quantity: 30,  # refills: 0   Last office visit provider:  1/06/23 CHRIS Liu     Requested Prescriptions   Pending Prescriptions Disp Refills     valACYclovir (VALTREX) 500 MG tablet [Pharmacy Med Name: VALACYCLOVIR HCL 500MG TABS] 30 tablet 0     Sig: TAKE ONE TABLET BY MOUTH EVERY DAY       Antivirals for Herpes Protocol Failed - 4/4/2023  5:54 AM        Failed - Normal serum creatinine on file in past 12 months     Recent Labs   Lab Test 11/05/21  1120   CR 0.73       Ok to refill medication if creatinine is low          Passed - Patient is age 12 or older        Passed - Recent (12 mo) or future (30 days) visit within the authorizing provider's specialty     Patient has had an office visit with the authorizing provider or a provider within the authorizing providers department within the previous 12 mos or has a future within next 30 days. See \"Patient Info\" tab in inbasket, or \"Choose Columns\" in Meds & Orders section of the refill encounter.              Passed - Medication is active on med list             Dee Mcmillan RN 04/04/23 1:02 PM  "

## 2023-05-02 NOTE — TELEPHONE ENCOUNTER
NOTES Status Details   OFFICE NOTE from referring provider Internal 01.-9.2023 Luz Carrasco, CHRIS CNP   OFFICE NOTE from other specialist     DISCHARGE SUMMARY from hospital     DISCHARGE REPORT from the ER     MEDICATION LIST Internal    LABS (Any and all labs)      Internal    Biopsy/pathology (Anything related to diagnoses I.e. fluid aspirations, lip biopsy, muscle biopsy)               Imaging (All imaging related to diagnoses)     Echo     HRCT     CXR     EMG                    Scleroderma/Dermatomyositis diagnoses     Previous Cardiology notes      Previous Pulmonary notes     Previous Dermatology notes     Previous GI notes     Lupus diagnoses     Previous Nephrology notes     Previous Dermatology notes     Previous Cardiology notes

## 2023-05-17 ENCOUNTER — TRANSFERRED RECORDS (OUTPATIENT)
Dept: HEALTH INFORMATION MANAGEMENT | Facility: CLINIC | Age: 38
End: 2023-05-17
Payer: MEDICAID

## 2023-07-06 ENCOUNTER — PRE VISIT (OUTPATIENT)
Dept: RHEUMATOLOGY | Facility: CLINIC | Age: 38
End: 2023-07-06
Payer: MEDICAID

## 2023-11-14 DIAGNOSIS — B00.9 HERPES SIMPLEX TYPE 1 INFECTION: ICD-10-CM

## 2023-11-14 RX ORDER — VALACYCLOVIR HYDROCHLORIDE 500 MG/1
TABLET, FILM COATED ORAL
Qty: 30 TABLET | Refills: 0 | Status: SHIPPED | OUTPATIENT
Start: 2023-11-14 | End: 2024-01-08

## 2023-11-21 ENCOUNTER — OFFICE VISIT (OUTPATIENT)
Dept: FAMILY MEDICINE | Facility: CLINIC | Age: 38
End: 2023-11-21
Payer: COMMERCIAL

## 2023-11-21 VITALS
HEART RATE: 82 BPM | RESPIRATION RATE: 16 BRPM | SYSTOLIC BLOOD PRESSURE: 132 MMHG | WEIGHT: 181 LBS | OXYGEN SATURATION: 100 % | BODY MASS INDEX: 30.9 KG/M2 | DIASTOLIC BLOOD PRESSURE: 84 MMHG | TEMPERATURE: 98 F | HEIGHT: 64 IN

## 2023-11-21 DIAGNOSIS — R05.1 ACUTE COUGH: Primary | ICD-10-CM

## 2023-11-21 PROCEDURE — 99213 OFFICE O/P EST LOW 20 MIN: CPT | Performed by: PHYSICIAN ASSISTANT

## 2023-11-21 RX ORDER — PREDNISONE 10 MG/1
40 TABLET ORAL DAILY
Qty: 20 TABLET | Refills: 0 | Status: SHIPPED | OUTPATIENT
Start: 2023-11-21 | End: 2023-11-26

## 2023-11-21 RX ORDER — CODEINE PHOSPHATE AND GUAIFENESIN 10; 100 MG/5ML; MG/5ML
1-2 SOLUTION ORAL EVERY 4 HOURS PRN
Qty: 118 ML | Refills: 0 | Status: SHIPPED | OUTPATIENT
Start: 2023-11-21 | End: 2023-11-30

## 2023-11-21 RX ORDER — AZITHROMYCIN 250 MG/1
TABLET, FILM COATED ORAL
Qty: 6 TABLET | Refills: 0 | Status: SHIPPED | OUTPATIENT
Start: 2023-11-21 | End: 2023-11-26

## 2023-11-21 ASSESSMENT — ASTHMA QUESTIONNAIRES: ACT_TOTALSCORE: 17

## 2023-11-21 ASSESSMENT — PATIENT HEALTH QUESTIONNAIRE - PHQ9
10. IF YOU CHECKED OFF ANY PROBLEMS, HOW DIFFICULT HAVE THESE PROBLEMS MADE IT FOR YOU TO DO YOUR WORK, TAKE CARE OF THINGS AT HOME, OR GET ALONG WITH OTHER PEOPLE: NOT DIFFICULT AT ALL
SUM OF ALL RESPONSES TO PHQ QUESTIONS 1-9: 4
SUM OF ALL RESPONSES TO PHQ QUESTIONS 1-9: 4

## 2023-11-21 NOTE — COMMUNITY RESOURCES LIST (ENGLISH)
11/21/2023   [facility]  N/A  For additional resource needs, please contact your health insurance member services or your primary care team.  Phone: [phone]   Email: N/A   Address: [facility] [place] [address]   Hours: N/A        Food and Nutrition       Food pantry  1  [facility] - Food Distribution Program Distance: 1.63 miles      In-Person   [address] [place] [address]  Language: English, Hmong, Salvadorean  Hours: Tue 3:00 PM - 5:00 PM  Fees: Free   Phone: (517) 547-5827 Email: info@[facility] HospitalNoteSickMiddletown [facility].Apptimate Website: http://ChristianaCare.org/programs/rzbpmt-meeslvnfk-eflczj/     2  [employer] - Drive Up Food Market Distance: 1.97 miles      [facility]   [address] [place] [address]  Language: English, Salvadorean  Hours: Thu 2:00 PM - 6:00 PM , Fri 10:00 AM - 2:00 PM  Fees: Free   Phone: (233) 195-2584 Email: rishi@[employer].Apptimate Website: https://[employer] labs.org/     SNAP application assistance  3  [employer] [place] Distance: 6.13 miles      Phone/Virtual   [address] [address]  Language: English, Hmong, Montenegrin, Moldovan, Salvadorean  Hours: Mon - Fri 8:30 AM - 4:30 PM  Fees: Free   Phone: (782) 628-1120 Email: helpline@hungersolutions.org Website: https://www.hungersolutions.org/programs/[place]-food-helpline/     4  [facility] Economic Support [facility] Distance: 3.48 miles      In-Person, Phone/Virtual   [address] [place] [address]  Language: English  Hours: Mon - Fri 8:00 AM - 4:30 PM  Fees: Free   Phone: (426) 280-3865 Email: cherry@[facility].mn. Website: https://www.[facility].us/787/Economic-Support     Soup kitchen or free meals  5  [employer] Sikh - Loaves and Fishes - Loaves and Fishes Distance: 3.93 miles      Pickup   [address] [place] [address]  Language: English  Hours: Wed 5:30 PM -  6:30 PM  Fees: Free   Phone: (540) 116-6628 Email: office@or[facility].org Website: https://www.[facility].org     6  [facility] - Take 'n Bake Meals Distance: 5.43 miles      Pickup   [address] [place] [address]  Language: English  Hours: Mon 3:00 PM - 8:00 PM , Tue - Fri 5:00 AM - 8:00 PM , Sat 7:00 AM - 2:00 PM  Fees: Free   Phone: (380) 858-7717 Website: https://communityed.[facility].org/          Hotlines and Helplines       Hotline - Housing crisis  7  [facility] Distance: 13.85 miles      Phone/Virtual   [address] [place] [address]  Language: English  Hours: Mon - Sun Open 24 Hours   Phone: (543) 824-3178 Email: communications@[facility].org Website: https://[facility]-[place].org/oursaviourshousing/     8  [facility] (Main Office) - Emergency Services Distance: 15.36 miles      Phone/Virtual   [address] [place] [address]  Language: English  Hours: Mon - Sun Open 24 Hours   Phone: (270) 794-3740 Email: info@[facility].org Website: http://[facility].org          Housing       Coordinated Entry access point  9  [facility] Distance: 6.02 miles      In-Person, Phone/Virtual   [address] [place] [address]  Language: English, Icelandic  Hours: Mon - Fri 8:30 AM - 4:30 PM  Fees: Free   Phone: (867) 404-9468 Email: info@mncare.org Website: https://www.mncare.org/locations/downtown-clinic/     10  [facility] ([facility]) - Housing Services Distance: 14.03 miles      In-Person   [address] [place] [address]  Language: English  Hours: Mon - Fri 9:00 AM - 5:00 PM  Fees: Free   Phone: (782) 297-4799 Email: housing@[facility].org Website: http://www.[facility].org/housing     Drop-in center or day shelter  11  [facility]  [place] Distance: 4.68 miles      In-Person   [address] [place] [address]  Language: English  Hours: Mon - Fri 9:00 AM - 4:00 PM  Fees: Free   Phone:  (951) 831-9959 Email: marion@Bantr.org Website: http://Bantr.[employer]ams     12  [facility] and [place] - Opportunity Center Distance: 13.9 miles      In-Person   [address] [place] [address]  Language: English, Peruvian, Greenlandic  Hours: Mon - Sat 7:00 AM - 3:00 PM  Fees: Free, Self Pay   Phone: (947) 415-9786 Email: info@Forgame Website: https://www.Forgame/locations/opportunity-center/     Housing search assistance  13  [employer] - https://Arkeia Software/ Distance: 14.07 miles      Phone/Virtual   [address] [place] [address]  Language: English  Hours: Mon - Sun Open 24 Hours   Email: info@Oco Website: https://Arkeia Software     14  [employer] - Online housing search assistance Distance: 15.23 miles      Phone/Virtual   [address] [address]  Language: English, Hmong, Peruvian, Greenlandic  Hours: Mon - Sun Open 24 Hours   Phone: (773) 187-5339 Email: info@WorkingPoint.org Website: http://www.housinglink.org/     Shelter for families  15   Distance: 13.92 miles      In-Person   [address] [place] [address]  Language: English  Hours: Mon - Fri 3:00 PM - 9:00 AM , Sat - Sun Open 24 Hours  Fees: Free   Phone: (582) 463-8765 Ext.1 Website: https://www.saintandrews.org/2020/07/03/emergency-family-shelter/     16  [facility] Distance: 23.89 miles      In-Person   [address] [place] [address]  Language: English  Hours: Mon - Sun Open 24 Hours  Fees: Free, Self Pay   Phone: (421) 498-9311 Email: Marisa@[facility].org Website: http://www.[facility].org/     Shelter for individuals  17  [facility] [place] - [facility] [place] [facility] - [facility] [place] [facility] Distance: 6.09 miles      In-Person   [address] [place] [address]  Language: English   Hours: Mon - Sun 5:00 PM - 10:00 AM  Fees: Free, Self Pay   Phone: (961) 505-3962 Email: info@MEDEM.Shodogg Website: https://www.MEDEM.Shodogg/locations/[facility]-saint-paul/     18  [facility] Distance: 13.85 miles      In-Person   [address] [place] [address]  Language: English  Hours: Mon - Sun Open 24 Hours  Fees: Free   Phone: (143) 286-1773 Email: communications@Monitor[employer].org Website: https://oscs-[place].org/oursaviourshousing/          Important Numbers & Websites       [address]ited[address].org  Poison Control   (749) 937-6979 [place]poison.org  Suicide and Crisis Lifeline   [address] [place], [place] [address]line.org  Childhelp [place] Child Abuse Hotline   233.439.1312 Childhelphotline.org  [place] Sexual Assault Hotline   (264) 118-1459 (HOPE) [facility] Runaway Safeline   (534) 462-4542 (RUNAWAY) [facility] Cent[facility].org  Pregnancy & Postpartum Support [place]   Call/text 315-524-9878 Ppsupportmn.org  Substance Abuse National Helpline ([facility]   871-314-HELP (5321) Findtreatment.gov  Emergency Services   911

## 2023-11-21 NOTE — COMMUNITY RESOURCES LIST (ENGLISH)
11/21/2023   [facility]  N/A  For questions about this resource list or additional care needs, please contact your primary care clinic or care manager.  Phone: [phone]   Email: N/A   Address: [address] [place] [address]   Hours: N/A        Food and Nutrition       Food pantry  1  [facility] - Food Distribution Program Distance: 1.63 miles      In-Person   [address] [place] [address]  Language: English, Hmong, Greenlandic  Hours: Tue 3:00 PM - 5:00 PM  Fees: Free   Phone: (619) 363-5158 Email: info@[facility] HospitalS[facility] MonolithicsVerde Valley Medical CenterEquifaxChristiana Hospital.Arrayit Website: http://[facility] Hospital[facility] Hospital.org/programs/swrawx-djwybubdo-zbnuzo/     2  [employer] - Drive Up Food Market Distance: 1.97 miles      Pickup   [address] [place] [address]  Language: English, Greenlandic  Hours: Thu 2:00 PM - 6:00 PM , Fri 10:00 AM - 2:00 PM  Fees: Free   Phone: (767) 342-1242 Email: rishi@Stretch Website: https://Grouply.org/     SNAP application assistance  3  [facility] - Economic Support [facility] Distance: 3.48 miles      In-Person, Phone/Virtual   [address] [place] [address]  Language: English  Hours: Mon - Fri 8:00 AM - 4:30 PM  Fees: Free   Phone: (263) 387-1059 Email: cherry@[facility].mn. Website: https://www.[facility]./787/Economic-Support     4  [facility] - SNAP Outreach Distance: 5.1 miles      Phone/Virtual   [address] [place] [address]  Language: Slovak, English, Hmong, Geneva, Greenlandic  Hours: Mon - Fri 10:00 AM - 12:00 PM , Mon - Fri 2:00 PM - 4:00 PM  Fees: Free   Phone: (760) 829-8067 Website: https://[facility].org/     Soup kitchen or free meals  5  [facility] Malena Yazidism Hinduism - Loaves and Fishes - Loaves and Fishes Distance: 3.93 miles      Pickup   [address] [place] [address]  Language: English  Hours: Wed 5:30 PM - 6:30 PM  Fees:  Free   Phone: (431) 648-5654 Email: office@[facility].org Website: https://www.[facility].org     6  [facility] - Take 'n Bake Meals Distance: 5.43 miles      Pickup   [address] [place] [address]  Language: English  Hours: Mon 3:00 PM - 8:00 PM , Tue - Fri 5:00 AM - 8:00 PM , Sat 7:00 AM - 2:00 PM  Fees: Free   Phone: (750) 916-2218 Website: https://communityed.[facility].org/          Hotlines and Helplines       Hotline - Housing crisis  7  [facility] Distance: 13.85 miles      Phone/Virtual   [address] [place] [address]  Language: English  Hours: Mon - Sun Open 24 Hours   Phone: (687) 203-9935 Email: communications@[facility].org Website: https://[facility]-mn.org/oursaviourshousing/     8  [facility] (Main Office) - Emergency Services Distance: 15.36 miles      Phone/Virtual   [address] [place] [address]  Language: English  Hours: Mon - Sun Open 24 Hours   Phone: (739) 893-3598 Email: info@[facility].[employer] Website: http://[facility].org          Housing       Coordinated Entry access point  9  [facility] Distance: 6.02 miles      In-Person, Phone/Virtual   [address] [place] [address]  Language: English, Serbian  Hours: Mon - Fri 8:30 AM - 4:30 PM  Fees: Free   Phone: (348) 836-8005 Email: info@[facility].org Website: https://www.mncare.org/locations/downtown-clinic/     10  [facility]) - Housing Services Distance: 14.03 miles      In-Person   [address] [place] [address]  Language: English  Hours: Mon - Fri 9:00 AM - 5:00 PM  Fees: Free   Phone: (652) 971-1000 Email: housing@[facility].org Website: http://www.[facility].org/housing     Drop-in center or day shelter  11  [facility] Distance: 4.68 miles      In-Person   [address] [place] [address]  Language: English  Hours: Mon - Fri 9:00 AM - 4:00 PM  Fees: Free   Phone: (504) 626-8994  Email: fronthieuk@listeningSoufun.org Website: http://Paradine.org     12  [facility] - Opportunity Center Distance: 13.9 miles      In-Person   [address] [place] [address]  Language: English, Cambodian, Korean  Hours: Mon - Sat 7:00 AM - 3:00 PM  Fees: Free, Self Pay   Phone: (250) 217-2151 Email: info@CuPcAkE & other things you bake Website: https://www.CuPcAkE & other things you bake/locations/opportunity-center/     Housing search assistance  13  [facility] - Housing Resources Distance: 2.66 miles      In-Person, Phone/Virtual   [address] [place] [address]  Language: English  Hours: Mon - Fri 8:00 AM - 4:30 PM  Fees: Free   Phone: (607) 379-4535 Email: office@ALENTY Website: http://ALENTY     14  [facility] - Online Housing Search Assistance Distance: 8.72 miles      Phone/Virtual   [address] [place] [address]  Language: English  Hours: Mon - Sun Open 24 Hours  Fees: Free   Phone: (917) 642-8070 Email: wilder@Gobooks Website: https://TargetingMantraVanGogh Imaging/     Shelter for families  15  [facility] Distance: 13.92 miles      In-Person   [address] [place] [address]  Language: English  Hours: Mon - Fri 3:00 PM - 9:00 AM , Sat - Sun Open 24 Hours  Fees: Free   Phone: (306) 608-4733 Ext.1 Website: https://www.saintandrews.org/2020/07/03/emergency-family-shelter/     16  [facility] Distance: 23.89 miles      In-Person   [address] [place] [address]  Language: English  Hours: Mon - Sun Open 24 Hours  Fees: Free, Self Pay   Phone: (894) 614-8419 Email: Marisa@[facility].org Website: http://www.[facility].org/     Shelter for individuals  17  [facility] [place] [facility] - [facility] [place] [facility] Distance: 6.09 miles      In-Person   [address]  [place] [address]  Language: English  Hours: Mon - Sun 5:00 PM - 10:00 AM  Fees: Free, Self Pay   Phone: (552) 523-6265 Email: info@Regroup Therapy Website: https://www.iZotope.Archive Systems/locations/[facility]-saint-paul/     18  [facility] Distance: 13.85 miles      In-Person   [address] [place] [address]  Language: English  Hours: Mon - Sun Open 24 Hours  Fees: Free   Phone: (422) 493-1058 Email: communications@[employer].org Website: https://[facility] IslandWireless Ronin Technologiesmn.org/oursaviourshousing/          Important Numbers & Websites       Emergency Services   911  [facility] Services   311  Poison Control   (212) 474-9776  Suicide Prevention Lifeline   (241) 634-9377 (TALK)  Child Abuse Hotline   (934) 919-9128 (4-A-Child)  Sexual Assault Hotline   (714) 824-9966 (HOPE)  National Runaway Safeline   (406) 404-9151 (RUNAWAY)  All-Options Talkline   (295) 406-7485  Substance Abuse Referral   (102) 918-5699 (HELP)

## 2023-11-21 NOTE — PROGRESS NOTES
Subjective:    Judy Peterson is a 38 year old female with a history of asthma and smoking, who presents with chief complaint of cough.  She has had a cough nasal congestion and headaches for 2 weeks.  Not getting any better.  She has used a lot of over-the-counter remedies and they have not been helpful.  Cough is the worst in the morning and the evening, better during the daytime.  She does feel like her asthma might be flaring up a little bit.  She does have an inhaler to use if she needs it.  Has not done any at home COVID test.  No known sick exposures.  She is smoking half a pack a day now.  Has not had any antibiotics in the past few months.    She says in the past when she has had symptoms like this, she usually needs an antibiotic, prednisone, and strong cough medicine.    In reviewing her chart, it looks like the last time she got antibiotics (in our system) was about a year ago.    Patient Active Problem List   Diagnosis    Phenylketonuria    Anxiety    Attention deficit hyperactivity disorder (ADHD)    Smoking    Mild persistent asthma without complication       Current Outpatient Medications:     azithromycin (ZITHROMAX) 250 MG tablet, Take 2 tablets (500 mg) by mouth daily for 1 day, THEN 1 tablet (250 mg) daily for 4 days., Disp: 6 tablet, Rfl: 0    guaiFENesin-codeine (ROBITUSSIN AC) 100-10 MG/5ML solution, Take 5-10 mLs by mouth every 4 hours as needed for cough (may cause drowsiness), Disp: 118 mL, Rfl: 0    predniSONE (DELTASONE) 10 MG tablet, Take 4 tablets (40 mg) by mouth daily for 5 days, Disp: 20 tablet, Rfl: 0    albuterol (PROAIR HFA/PROVENTIL HFA/VENTOLIN HFA) 108 (90 Base) MCG/ACT inhaler, Inhale 2 puffs into the lungs every 4 hours as needed for shortness of breath / dyspnea or wheezing (Patient not taking: Reported on 11/21/2023), Disp: 18 g, Rfl: 1    budesonide-formoterol (SYMBICORT) 160-4.5 MCG/ACT Inhaler, Inhale 2 puffs into the lungs 2 times daily (Patient not taking: Reported  "on 11/21/2023), Disp: 10.2 g, Rfl: 11    montelukast (SINGULAIR) 10 MG tablet, Take 1 tablet (10 mg) by mouth At Bedtime (Patient not taking: Reported on 11/21/2023), Disp: 90 tablet, Rfl: 3    spironolactone (ALDACTONE) 50 MG tablet, TAKE ONE TABLET BY MOUTH EVERY DAY WITH PLENTY OF WATER (Patient not taking: Reported on 11/21/2023), Disp: , Rfl:     valACYclovir (VALTREX) 1000 mg tablet, TAKE TWO TABLETS BY MOUTH TWICE A DAY (Patient not taking: Reported on 11/21/2023), Disp: 4 tablet, Rfl: 11    valACYclovir (VALTREX) 500 MG tablet, TAKE ONE TABLET BY MOUTH EVERY DAY (Patient not taking: Reported on 11/21/2023), Disp: 30 tablet, Rfl: 0      Objective:   Allergies:  Cefprozil    Vitals:  Vitals:    11/21/23 1131   BP: 132/84   BP Location: Left arm   Patient Position: Sitting   Cuff Size: Adult Large   Pulse: 82   Resp: 16   Temp: 98  F (36.7  C)   TempSrc: Temporal   SpO2: 100%   Weight: 82.1 kg (181 lb)   Height: 1.626 m (5' 4\")       Body mass index is 31.07 kg/m .    Vital signs reviewed.  General: Patient is alert and oriented x 3, in no apparent distress  Ears: TMs are non-erythematous with good light reflex bilaterally  Lymphatic: no anterior cervical lymph node enlargement  Cardiac: regular rate and rhythm, no murmurs  Pulmonary: lungs clear to auscultation bilaterally, no crackles, rales, rhonchi, or wheezing noted        Assessment and Plan:   1. Acute cough  New concern.  Cough for at least 2 weeks, not getting better.  Also does have risk factors of asthma and smoking.  May be having an asthma flareup as well.  Given length of time symptoms been going on, with lack of improvement, prescription sent for Z-Jay.  Prescription also sent for prednisone.  She requested cough medicine with codeine.  I prescribed her 1 bottle of this.  I reviewed PDMP for her and it was negative.  Discussed appropriate precautions with cough medicine.  If not feeling better after finishing all medicines, follow-up with PCP.  - " azithromycin (ZITHROMAX) 250 MG tablet; Take 2 tablets (500 mg) by mouth daily for 1 day, THEN 1 tablet (250 mg) daily for 4 days.  Dispense: 6 tablet; Refill: 0  - predniSONE (DELTASONE) 10 MG tablet; Take 4 tablets (40 mg) by mouth daily for 5 days  Dispense: 20 tablet; Refill: 0  - guaiFENesin-codeine (ROBITUSSIN AC) 100-10 MG/5ML solution; Take 5-10 mLs by mouth every 4 hours as needed for cough (may cause drowsiness)  Dispense: 118 mL; Refill: 0       This dictation uses voice recognition software, which may contain typographical errors.

## 2023-11-30 DIAGNOSIS — R05.1 ACUTE COUGH: ICD-10-CM

## 2023-11-30 RX ORDER — CODEINE PHOSPHATE AND GUAIFENESIN 10; 100 MG/5ML; MG/5ML
1-2 SOLUTION ORAL EVERY 4 HOURS PRN
Qty: 118 ML | Refills: 0 | Status: SHIPPED | OUTPATIENT
Start: 2023-11-30 | End: 2024-01-08

## 2023-11-30 NOTE — TELEPHONE ENCOUNTER
Medication Question or Refill        What medication are you calling about (include dose and sig)?:   guaiFENesin-codeine (ROBITUSSIN AC) 100-10 MG/5ML solution     Preferred Pharmacy:     [address] [place], [place] [address] [address] [place] [address]  Phone: [phone] Fax: [phone]    Controlled Substance Agreement on file:   CSA -- Patient Level:    CSA: None found at the patient level.       Who prescribed the medication?: RACHEAL Lou    Do you need a refill? Yes    When did you use the medication last? NA    Patient offered an appointment? No    Do you have any questions or concerns?  No

## 2023-12-04 ENCOUNTER — OFFICE VISIT (OUTPATIENT)
Dept: FAMILY MEDICINE | Facility: CLINIC | Age: 38
End: 2023-12-04
Payer: MEDICAID

## 2023-12-04 VITALS
HEIGHT: 64 IN | SYSTOLIC BLOOD PRESSURE: 117 MMHG | BODY MASS INDEX: 31.09 KG/M2 | OXYGEN SATURATION: 99 % | RESPIRATION RATE: 16 BRPM | WEIGHT: 182.13 LBS | TEMPERATURE: 98.5 F | HEART RATE: 86 BPM | DIASTOLIC BLOOD PRESSURE: 81 MMHG

## 2023-12-04 DIAGNOSIS — Z71.6 ENCOUNTER FOR TOBACCO USE CESSATION COUNSELING: ICD-10-CM

## 2023-12-04 DIAGNOSIS — J01.90 ACUTE BACTERIAL RHINOSINUSITIS: Primary | ICD-10-CM

## 2023-12-04 DIAGNOSIS — B96.89 ACUTE BACTERIAL RHINOSINUSITIS: Primary | ICD-10-CM

## 2023-12-04 PROCEDURE — 99213 OFFICE O/P EST LOW 20 MIN: CPT

## 2023-12-04 RX ORDER — NICOTINE 21 MG/24HR
1 PATCH, TRANSDERMAL 24 HOURS TRANSDERMAL EVERY 24 HOURS
Qty: 14 PATCH | Refills: 0 | Status: SHIPPED | OUTPATIENT
Start: 2023-12-04 | End: 2024-04-22

## 2023-12-04 RX ORDER — NICOTINE 21 MG/24HR
1 PATCH, TRANSDERMAL 24 HOURS TRANSDERMAL EVERY 24 HOURS
Qty: 90 PATCH | Status: CANCELLED | OUTPATIENT
Start: 2023-12-04

## 2023-12-04 RX ORDER — NICOTINE 21 MG/24HR
1 PATCH, TRANSDERMAL 24 HOURS TRANSDERMAL EVERY 24 HOURS
Qty: 42 PATCH | Refills: 0 | Status: SHIPPED | OUTPATIENT
Start: 2023-12-04 | End: 2024-01-08

## 2023-12-04 ASSESSMENT — ENCOUNTER SYMPTOMS: COUGH: 1

## 2023-12-04 NOTE — ASSESSMENT & PLAN NOTE
Patient presents today with acute illness, but redness to stop smoking.  She has quit successfully in the past, but cold turkey, and is interested in nicotine replacement therapy today.  She does not want any oral nicotine replacement therapy, so we will initiate the patch.  She is smoking greater than 10 cigarettes a day, so we will start with a 21 mg per day patches x 6 weeks, followed by a decrease to 14 mg/day patches for 2 weeks, and finally 7 mg/day patches for a final 2 weeks.  We discussed potential side effects with this medication and patient was given a handout.  We briefly discussed other cessation tactics, specifically Chantix and Wellbutrin, and following up with her PCP as needed.

## 2023-12-04 NOTE — ASSESSMENT & PLAN NOTE
Recurrence of symptoms despite azithromycin. Her asthma exacerbation seems to have resolved with no wheezing on exam and improved coughing, so deferred additional steroids today. With pressure behind the eyes, headache and ear plugging in addition to rhinorrhea, symptoms are likely related to bacterial rhino sinusitis which was likely not adequately treated with azithromycin. Will treat with 7 days of Augmentin. Has allergy listed to cefprozil but has tolerated Augmentin in the past (December, 2022).  Expected therapeutic effects and potential side effects were discussed today.  We discussed the cross sensitivity potential. Discussed other supportive cares including OTC medications, fluids, humidification. Follow up with PCP if not improving or worsening in any way. Patient expressed an understanding of and agreement with this plan.  All questions were answered.

## 2023-12-04 NOTE — PATIENT INSTRUCTIONS
Start Augmentin twice daily for 7 days. Continue to use things like nasal spray, cough suppressants, humidified air/steam showers.  Nicotine replacement therapy was started today. See attachment for other resources. You will start at the highest dose (21mg/day) for 6 weeks. Then the 14mg/day for 2 weeks, finally 7mg/day for 2 weeks and STOP.   Follow up with your PCP regarding either of these things if symptoms persist/you are unsuccessful with the patch

## 2023-12-04 NOTE — PROGRESS NOTES
Assessment & Plan   Problem List Items Addressed This Visit       Acute bacterial rhinosinusitis - Primary     Recurrence of symptoms despite azithromycin. Her asthma exacerbation seems to have resolved with no wheezing on exam and improved coughing, so deferred additional steroids today. With pressure behind the eyes, headache and ear plugging in addition to rhinorrhea, symptoms are likely related to bacterial rhino sinusitis which was likely not adequately treated with azithromycin. Will treat with 7 days of Augmentin. Has allergy listed to cefprozil but has tolerated Augmentin in the past (December, 2022).  Expected therapeutic effects and potential side effects were discussed today.  We discussed the cross sensitivity potential. Discussed other supportive cares including OTC medications, fluids, humidification. Follow up with PCP if not improving or worsening in any way. Patient expressed an understanding of and agreement with this plan.  All questions were answered.         Relevant Medications    amoxicillin-clavulanate (AUGMENTIN) 875-125 MG tablet    Encounter for tobacco use cessation counseling     Patient presents today with acute illness, but redness to stop smoking.  She has quit successfully in the past, but cold turkey, and is interested in nicotine replacement therapy today.  She does not want any oral nicotine replacement therapy, so we will initiate the patch.  She is smoking greater than 10 cigarettes a day, so we will start with a 21 mg per day patches x 6 weeks, followed by a decrease to 14 mg/day patches for 2 weeks, and finally 7 mg/day patches for a final 2 weeks.  We discussed potential side effects with this medication and patient was given a handout.  We briefly discussed other cessation tactics, specifically Chantix and Wellbutrin, and following up with her PCP as needed.         Relevant Medications    nicotine (NICODERM CQ) 21 MG/24HR 24 hr patch    nicotine (NICODERM CQ) 14 MG/24HR  "24 hr patch    nicotine (NICODERM CQ) 7 MG/24HR 24 hr patch      Nicotine/Tobacco Cessation:  She reports that she has been smoking cigarettes. She has been smoking an average of .5 packs per day. She has never used smokeless tobacco.  Nicotine/Tobacco Cessation Plan:   Pharmacotherapies : Nicotine patch    CHRIS Harrell CNP  [facility] MEME Kim is a 38 year old, presenting for the following health issues:  Cough (Stuffy nose, headaches. Seen 11/21/23 got antibx for 5 days. Sx went away and came back after the 5 days. )        12/4/2023     8:11 AM   Additional Questions   Roomed by sac   Accompanied by self         12/4/2023     8:11 AM   Patient Reported Additional Medications   Patient reports taking the following new medications no       Seen 11/21 for similar symptoms. She was prescribed azithromycin at that time as well as a prednisone.     Current symptoms include coughing, decreased sense of taste, facial pressure/headaches, ear plugging. Clear rhinorrhea. Cough is productive with clear phlegm. Denies fevers. Does have wheezing (asthma, smoker), no worsening shortness of breath, no chest pain. Coughing is slightly improved, but nasal symptoms are the most bothersome to her.     Has been taking Flonase nasal spray. Cough medicine. Was taking a decongestant. Used netipot.     Wants to quit smoking. Has successfully quit in the past but cold turkey. Does not want lozenges/gum.        Cough       Review of Systems   Respiratory:  Positive for cough.           Objective    /81 (BP Location: Left arm, Patient Position: Sitting, Cuff Size: Adult Large)   Pulse 86   Temp 98.5  F (36.9  C) (Oral)   Resp 16   Ht 1.626 m (5' 4\")   Wt 82.6 kg (182 lb 2 oz)   LMP 11/19/2023   SpO2 99%   BMI 31.26 kg/m    Body mass index is 31.26 kg/m .    Physical Exam  Vitals and nursing note reviewed.   Constitutional:       Appearance: Normal appearance. She is " ill-appearing.   HENT:      Right Ear: Ear canal and external ear normal.      Left Ear: Ear canal and external ear normal.      Nose: Congestion and rhinorrhea present.      Right Turbinates: Enlarged.      Left Turbinates: Enlarged.      Right Sinus: Maxillary sinus tenderness and frontal sinus tenderness present.      Left Sinus: Maxillary sinus tenderness and frontal sinus tenderness present.      Mouth/Throat:      Mouth: Mucous membranes are moist.      Pharynx: Posterior oropharyngeal erythema present. No oropharyngeal exudate.   Eyes:      General:         Right eye: No discharge.         Left eye: No discharge.      Conjunctiva/sclera: Conjunctivae normal.   Cardiovascular:      Rate and Rhythm: Normal rate and regular rhythm.   Pulmonary:      Effort: Pulmonary effort is normal. No respiratory distress.      Breath sounds: No stridor. No wheezing or rales.   Musculoskeletal:      Cervical back: Normal range of motion and neck supple. No tenderness.   Lymphadenopathy:      Cervical: No cervical adenopathy.   Neurological:      Mental Status: She is alert.

## 2023-12-14 ENCOUNTER — TELEPHONE (OUTPATIENT)
Dept: FAMILY MEDICINE | Facility: CLINIC | Age: 38
End: 2023-12-14
Payer: MEDICAID

## 2023-12-14 ENCOUNTER — TRANSFERRED RECORDS (OUTPATIENT)
Dept: HEALTH INFORMATION MANAGEMENT | Facility: CLINIC | Age: 38
End: 2023-12-14
Payer: MEDICAID

## 2023-12-14 DIAGNOSIS — R41.840 DIFFICULTY CONCENTRATING: Primary | ICD-10-CM

## 2023-12-14 NOTE — TELEPHONE ENCOUNTER
Spoke with pt and relayed provider message. Mental health  phone number given and questions answered.

## 2023-12-14 NOTE — TELEPHONE ENCOUNTER
Reason for call:  Other     Patient called regarding (reason for call): call back    Additional comments: Patient is calling and stating that she needs a testing done ADHD and states she needs a referral for this. Please advise and call patient back when referral is placed please and thank you.    Phone number to reach patient:  Cell number on file:    Telephone Information:   Mobile [phone]       Best Time:  any    Can we leave a detailed message on this number?  YES

## 2024-01-08 ENCOUNTER — OFFICE VISIT (OUTPATIENT)
Dept: FAMILY MEDICINE | Facility: CLINIC | Age: 39
End: 2024-01-08
Attending: NURSE PRACTITIONER
Payer: COMMERCIAL

## 2024-01-08 VITALS
BODY MASS INDEX: 31.77 KG/M2 | SYSTOLIC BLOOD PRESSURE: 128 MMHG | OXYGEN SATURATION: 98 % | HEIGHT: 64 IN | TEMPERATURE: 98 F | WEIGHT: 186.1 LBS | RESPIRATION RATE: 14 BRPM | HEART RATE: 80 BPM | DIASTOLIC BLOOD PRESSURE: 74 MMHG

## 2024-01-08 DIAGNOSIS — B00.9 HERPES SIMPLEX TYPE 1 INFECTION: ICD-10-CM

## 2024-01-08 DIAGNOSIS — M25.552 CHRONIC PAIN OF BOTH HIPS: ICD-10-CM

## 2024-01-08 DIAGNOSIS — M25.551 CHRONIC PAIN OF BOTH HIPS: ICD-10-CM

## 2024-01-08 DIAGNOSIS — F90.9 ATTENTION DEFICIT HYPERACTIVITY DISORDER (ADHD), UNSPECIFIED ADHD TYPE: ICD-10-CM

## 2024-01-08 DIAGNOSIS — J45.41 MODERATE PERSISTENT ASTHMA WITH EXACERBATION: Primary | ICD-10-CM

## 2024-01-08 DIAGNOSIS — G89.29 CHRONIC PAIN OF BOTH HIPS: ICD-10-CM

## 2024-01-08 PROCEDURE — 99214 OFFICE O/P EST MOD 30 MIN: CPT | Performed by: NURSE PRACTITIONER

## 2024-01-08 RX ORDER — ALBUTEROL SULFATE 90 UG/1
2 AEROSOL, METERED RESPIRATORY (INHALATION) EVERY 4 HOURS PRN
Qty: 18 G | Refills: 1 | Status: SHIPPED | OUTPATIENT
Start: 2024-01-08 | End: 2024-03-11

## 2024-01-08 RX ORDER — VALACYCLOVIR HYDROCHLORIDE 1 G/1
TABLET, FILM COATED ORAL
Qty: 4 TABLET | Refills: 11 | Status: SHIPPED | OUTPATIENT
Start: 2024-01-08 | End: 2024-07-02

## 2024-01-08 RX ORDER — MONTELUKAST SODIUM 10 MG/1
10 TABLET ORAL AT BEDTIME
Qty: 90 TABLET | Refills: 3 | Status: SHIPPED | OUTPATIENT
Start: 2024-01-08

## 2024-01-08 ASSESSMENT — ASTHMA QUESTIONNAIRES
QUESTION_2 LAST FOUR WEEKS HOW OFTEN HAVE YOU HAD SHORTNESS OF BREATH: THREE TO SIX TIMES A WEEK
ACT_TOTALSCORE: 15
QUESTION_1 LAST FOUR WEEKS HOW MUCH OF THE TIME DID YOUR ASTHMA KEEP YOU FROM GETTING AS MUCH DONE AT WORK, SCHOOL OR AT HOME: MOST OF THE TIME
QUESTION_4 LAST FOUR WEEKS HOW OFTEN HAVE YOU USED YOUR RESCUE INHALER OR NEBULIZER MEDICATION (SUCH AS ALBUTEROL): ONE OR TWO TIMES PER DAY
QUESTION_5 LAST FOUR WEEKS HOW WOULD YOU RATE YOUR ASTHMA CONTROL: SOMEWHAT CONTROLLED
QUESTION_3 LAST FOUR WEEKS HOW OFTEN DID YOUR ASTHMA SYMPTOMS (WHEEZING, COUGHING, SHORTNESS OF BREATH, CHEST TIGHTNESS OR PAIN) WAKE YOU UP AT NIGHT OR EARLIER THAN USUAL IN THE MORNING: NOT AT ALL
ACT_TOTALSCORE: 15

## 2024-01-08 ASSESSMENT — ENCOUNTER SYMPTOMS
WEAKNESS: 0
CHILLS: 0
NAUSEA: 0
COUGH: 1
HEARTBURN: 0
MYALGIAS: 0
SORE THROAT: 0
HEMATOCHEZIA: 0
PARESTHESIAS: 0
DIZZINESS: 0
PALPITATIONS: 0
ARTHRALGIAS: 1
CONSTIPATION: 0
FEVER: 0
JOINT SWELLING: 1
EYE PAIN: 0
BREAST MASS: 0
FREQUENCY: 0
DIARRHEA: 0
HEMATURIA: 0
ABDOMINAL PAIN: 0
DYSURIA: 0
NERVOUS/ANXIOUS: 0
SHORTNESS OF BREATH: 0
HEADACHES: 1

## 2024-01-08 ASSESSMENT — PAIN SCALES - GENERAL: PAINLEVEL: NO PAIN (0)

## 2024-01-08 ASSESSMENT — ACTIVITIES OF DAILY LIVING (ADL)
CURRENT_FUNCTION: PREPARING MEALS REQUIRES ASSISTANCE
CURRENT_FUNCTION: LAUNDRY REQUIRES ASSISTANCE
CURRENT_FUNCTION: HOUSEWORK REQUIRES ASSISTANCE
CURRENT_FUNCTION: SHOPPING REQUIRES ASSISTANCE

## 2024-01-08 NOTE — PROGRESS NOTES
Assessment and Plan:     Moderate persistent asthma with exacerbation  ACT score is 15.  This is uncontrolled.  I encouraged her to use her Symbicort daily.  She continues montelukast daily and albuterol as needed.  I strongly encouraged smoking cessation.  Patient has nicotine patches.  - albuterol (PROAIR HFA/PROVENTIL HFA/VENTOLIN HFA) 108 (90 Base) MCG/ACT inhaler  Dispense: 18 g; Refill: 1  - montelukast (SINGULAIR) 10 MG tablet  Dispense: 90 tablet; Refill: 3    Attention deficit hyperactivity disorder (ADHD), unspecified ADHD type  Will refer to mental health to rule out underlying learning disabilities as well.  - Adult Mental Health  Referral    Chronic pain of both hips  Will refer to pain management.  Patient is filing for disability.  Patient continues to see orthopedics who recommends hip replacements, but would prefer to delay surgery for 5 to 10 years.  - Pain Management  Referral    Herpes simplex type 1 infection  Patient continues valacyclovir as needed.  - valACYclovir (VALTREX) 1000 mg tablet  Dispense: 4 tablet; Refill: 11        Subjective:     Judy is a 38 year old female presenting to the clinic for medication management.  Patient has moderate persistent asthma.  She takes Singulair daily and uses albuterol with activity.  Patient states she occasionally uses Symbicort, but has decreased the dose due to the cost of the medication.  She is smoking 1 pack/day.  She has the nicotine patches at home to use.  Patient takes valacyclovir as needed for herpes simplex type I.  She tends to develop cold sores under stress.  Patient has chronic bilateral hip pain.  She has a history of hip dysplasia.  She has seen orthopedics.  She has tried cortisone injections and physical therapy.  She has pain with sitting and standing.  Patient is trying to obtain disability due to this.  Patient also has ADHD and is not currently taking medication.  She was told that she needs to be evaluated  for learning disabilities as well.    Reviewof Systems: A complete 14 point review of systems was obtained and is negative or as stated in the history of present illness.    Social History     Socioeconomic History    Marital status: Single     Spouse name: Not on file    Number of children: Not on file    Years of education: Not on file    Highest education level: Not on file   Occupational History    Not on file   Tobacco Use    Smoking status: Every Day     Packs/day: .5     Types: Cigarettes     Passive exposure: Current    Smokeless tobacco: Never   Vaping Use    Vaping Use: Never used   Substance and Sexual Activity    Alcohol use: No    Drug use: No    Sexual activity: Not on file   Other Topics Concern    Not on file   Social History Narrative    Not on file     Social Determinants of Health     Financial Resource Strain: Low Risk  (1/8/2024)    Financial Resource Strain     Within the past 12 months, have you or your family members you live with been unable to get utilities (heat, electricity) when it was really needed?: No   Food Insecurity: High Risk (1/8/2024)    Food Insecurity     Within the past 12 months, did you worry that your food would run out before you got money to buy more?: No     Within the past 12 months, did the food you bought just not last and you didn t have money to get more?: Yes   Transportation Needs: Low Risk  (1/8/2024)    Transportation Needs     Within the past 12 months, has lack of transportation kept you from medical appointments, getting your medicines, non-medical meetings or appointments, work, or from getting things that you need?: No   Physical Activity: Not on file   Stress: Not on file   Social Connections: Not on file   Interpersonal Safety: Low Risk  (1/8/2024)    Interpersonal Safety     Do you feel physically and emotionally safe where you currently live?: Yes     Within the past 12 months, have you been hit, slapped, kicked or otherwise physically hurt by  "someone?: No     Within the past 12 months, have you been humiliated or emotionally abused in other ways by your partner or ex-partner?: No   Housing Stability: High Risk (1/8/2024)    Housing Stability     Do you have housing? : No     Are you worried about losing your housing?: No       Active Ambulatory Problems     Diagnosis Date Noted    Phenylketonuria     Anxiety     Attention deficit hyperactivity disorder (ADHD)     Smoking 10/04/2016    Mild persistent asthma without complication 07/31/2020    Acute bacterial rhinosinusitis 12/04/2023    Encounter for tobacco use cessation counseling 12/04/2023     Resolved Ambulatory Problems     Diagnosis Date Noted    No Resolved Ambulatory Problems     Past Medical History:   Diagnosis Date    Anxiety     Uncomplicated asthma        Family History   Problem Relation Age of Onset    Rheumatoid Arthritis Mother        Objective:     /74   Pulse 80   Temp 98  F (36.7  C)   Resp 14   Ht 1.626 m (5' 4\")   Wt 84.4 kg (186 lb 1.6 oz)   LMP 12/24/2023 (Approximate)   SpO2 98%   Breastfeeding No   BMI 31.94 kg/m      Patient is alert, in no obvious distress.   Skin: Warm, dry.  No lesions or rashes.  Skin turgor rapid return.   HEENT:  Head normocephalic, atraumatic.  Eyes normal.  Ears normal.  Nose patent, mucosa pink.  Oropharynx mucosa pink.  No lesions or tonsillar enlargement.   Neck: Supple, no lymphadenopathy.   Lungs:  Clear to auscultation. Respirations even and unlabored.  No wheezing or rales noted.   Heart:  Regular rate and rhythm.  No murmurs, S3, S4, gallops, or rubs.    Musculoskeletal:  Full ROM of extremities.            "

## 2024-01-08 NOTE — COMMUNITY RESOURCES LIST (ENGLISH)
01/08/2024   [facility]  N/A  For questions about this resource list or additional care needs, please contact your primary care clinic or care manager.  Phone: [phone]   Email: N/A   Address: [address] [place] [address]   Hours: N/A        Food and Nutrition       Food pantry  1  [facility] - Food Distribution Program Distance: 1.63 miles      In-Person   [address] [place] [address]  Language: English, Hmong, Wolof  Hours: Tue 3:00 PM - 5:00 PM  Fees: Free   Phone: (972) 749-3607 Email: info@[employer] Website: http://[facility]PRINT[facility].org/programs/qnjfva-sztroaiki-pdxuex/     2  [employer] - Drive Up Food Market Distance: 1.97 miles      Pick   [address] [place] [address]  Language: English, Wolof  Hours: Thu 2:00 PM - 6:00 PM , Fri 10:00 AM - 2:00 PM  Fees: Free   Phone: (294) 758-6688 Email: rishi@[facility].[employer]777 Website: https://[facility].org/     SNAP application assistance  3  [facility] Economic Support [facility] Distance: 3.48 miles      In-Person, Phone/Virtual   [address] [place] [address]  Language: English  Hours: Mon - Fri 8:00 AM - 4:30 PM  Fees: Free   Phone: (168) 680-1538 Email: cherry@[facility].mn. Website: https://www.[facility]./787/Economic-Support     4  [employer]) [facility] Distance: 4.4 miles      In-Person   [address] [place] [address]  Language: English, Wolof  Hours: Mon - Fri 8:30 AM - 5:00 PM  Fees: Free   Phone: (628) 906-2535 Email: info@clues.org Website: http://www.clues.org     Soup kitchen or free meals  5  [facility] - [employer] - Loaves and Fishes Distance: 3.93 miles      Pickup   [address] [place] [address]  Language: English  Hours: Wed 5:30 PM - 6:30 PM  Fees: Free   Phone: (611) 787-5184  Email: office@[facility].org Website: https://www.[facility].org     6  [facility] [place] - [facility] Distance: 4.81 miles      Pickup   [address] [place] [address]  Language: English  Hours: Wed 2:00 PM - 4:00 PM , Fri 2:00 PM - 4:00 PM  Fees: Free   Phone: (561) 584-9630 Email: Tom@.[facility]. Website: https://www.[facility] [place].[facility]/facilities/slrhpxuhl-ndbtr-diovnzndh-Lasara          Hotlines and Helplines       Hotline - Housing crisis  7  [facility] Distance: 13.85 miles      Phone/Virtual   [address] [place] [address]  Language: English  Hours: Mon - Sun Open 24 Hours   Phone: (493) 763-9539 Email: communications@[facility].org Website: https://[facility].org/oursaviourshousing/     8  [facility] (Main Office) Distance: 15.36 miles      Phone/Virtual   [address] [place] [address]  Language: English  Hours: Mon - Sun Open 24 Hours   Phone: (796) 772-6477 Email: info@[facility].org Website: http://[facility].org          Housing       Coordinated Entry access point  9  [facility] Distance: 6.04 miles      In-Person, Phone/Virtual   [address] [place] [address]  Language: English  Hours: Mon - Fri 8:30 AM - 4:30 PM  Fees: Free   Phone: (958) 597-8471 Email: info@[facility].org Website: https://www.[facility].org/locations/[facility]/     10  [facility] ([facility] - Housing Services Distance: 14.03 miles      In-Person   [address] [place] [address]  Language: English  Hours: Mon - Fri 9:00 AM - 5:00 PM  Fees: Free   Phone: (816) 329-7992 Email: housing@[facility].org Website: http://www.[facility].org/housing     Drop-in center or day shelter  11  [facility] Distance: 4.68 miles      In-Person   [address] [place] [address]  Language: English  Hours: Mon - Fri 9:00 AM - 4:00 PM  Fees: Free   Phone: (150) 816-5613 Email:  marion@[employer].org Website: http://listeningTakes.org     12  [facility] Opportunity Center Distance: 13.9 miles      In-Person   [address] [place] [address]  Language: English, Tanzanian, Mohawk  Hours: Mon - Sat 7:00 AM - 3:00 PM  Fees: Free, Self Pay   Phone: (709) 643-4530 Email: info@[employer] Website: https://www.[employer]/locations/opportunity-center/     Housing search assistance  13  [facility] - Housing Resources Distance: 2.66 miles      In-Person, Phone/Virtual   [address] [place] [address]  Language: English  Hours: Mon - Fri 8:00 AM - 4:30 PM  Fees: Free   Phone: (574) 957-9310 Email: office@[employer] Website: http://[employer]     14  [facility] - Online Housing Search Assistance Distance: 8.72 miles      Phone/Virtual   [address] [place] [address]  Language: English  Hours: Mon - Sun Open 24 Hours  Fees: Free   Phone: (877) 471-6405 Email: wilder@[employer] Website: https://[employer] KnowledgeOklahoma CityStraighterLine/     Shelter for families  15  [facility] Distance: 13.92 miles      In-Person   [address] [place] [address]  Language: English  Hours: Mon - Fri 3:00 PM - 9:00 AM , Sat - Sun Open 24 Hours  Fees: Free   Phone: (909) 951-1362 Ext.1 Website: https://www.[facility].org/2020/07/03/emergency-family-shelter/     16  [facility] Distance: 23.89 miles      In-Person   [address] [place] [address]  Language: English  Hours: Mon - Sun Open 24 Hours  Fees: Free, Self Pay   Phone: (363) 684-1188 Email: Marsia@[facility].org Website: http://www.[facility].org/     Shelter for individuals  17  [facility] [place] [facility] [place] [facility] Distance: 6.09 miles      In-Person   [address] [address]  Language: English  Hours: Mon - Sun 5:00 PM - 10:00 AM  Fees: Free, Self Pay   Phone: (358) 476-5157 Email: info@[employer] Website: https://www.Nichewithorg/locations/[facility]-[place]/     18  [facility] [place] - Homeless Resources and Housing Information - Emergency housing Distance: 10.13 miles      In-Person, Phone/Virtual   [address] [place] [address]  Language: English  Hours: Mon - Fri 8:00 AM - 4:30 PM  Fees: Free   Phone: (319) 852-4298 Email: cherry@[facility]. Website: https://www.[facility].us/Facilities/Facility/Details/[facility]-22          Important Numbers & Websites       Emergency Services   911  [facility]   311  Poison Control   (797) 699-3877  Suicide Prevention Lifeline   (790) 903-6675 (TALK)  Child Abuse Hotline   (189) 692-1850 (4-A-Child)  Sexual Assault Hotline   (213) 593-1377 (HOPE)  National Runaway Safeline   (857) 381-2030 (RUNAWAY)  All-Options Talkline   (461) 756-1923  Substance Abuse Referral   (320) 493-7987 (HELP)

## 2024-01-10 ENCOUNTER — TRANSFERRED RECORDS (OUTPATIENT)
Dept: HEALTH INFORMATION MANAGEMENT | Facility: CLINIC | Age: 39
End: 2024-01-10
Payer: MEDICAID

## 2024-01-21 ENCOUNTER — HEALTH MAINTENANCE LETTER (OUTPATIENT)
Age: 39
End: 2024-01-21

## 2024-01-24 ENCOUNTER — TRANSFERRED RECORDS (OUTPATIENT)
Dept: HEALTH INFORMATION MANAGEMENT | Facility: CLINIC | Age: 39
End: 2024-01-24
Payer: MEDICAID

## 2024-02-08 ASSESSMENT — ANXIETY QUESTIONNAIRES
3. WORRYING TOO MUCH ABOUT DIFFERENT THINGS: NOT AT ALL
GAD7 TOTAL SCORE: 2
8. IF YOU CHECKED OFF ANY PROBLEMS, HOW DIFFICULT HAVE THESE MADE IT FOR YOU TO DO YOUR WORK, TAKE CARE OF THINGS AT HOME, OR GET ALONG WITH OTHER PEOPLE?: SOMEWHAT DIFFICULT
7. FEELING AFRAID AS IF SOMETHING AWFUL MIGHT HAPPEN: NOT AT ALL
IF YOU CHECKED OFF ANY PROBLEMS ON THIS QUESTIONNAIRE, HOW DIFFICULT HAVE THESE PROBLEMS MADE IT FOR YOU TO DO YOUR WORK, TAKE CARE OF THINGS AT HOME, OR GET ALONG WITH OTHER PEOPLE: SOMEWHAT DIFFICULT
7. FEELING AFRAID AS IF SOMETHING AWFUL MIGHT HAPPEN: NOT AT ALL
1. FEELING NERVOUS, ANXIOUS, OR ON EDGE: NOT AT ALL
6. BECOMING EASILY ANNOYED OR IRRITABLE: MORE THAN HALF THE DAYS
5. BEING SO RESTLESS THAT IT IS HARD TO SIT STILL: NOT AT ALL
GAD7 TOTAL SCORE: 2
4. TROUBLE RELAXING: NOT AT ALL
2. NOT BEING ABLE TO STOP OR CONTROL WORRYING: NOT AT ALL

## 2024-02-08 ASSESSMENT — PAIN SCALES - PAIN ENJOYMENT GENERAL ACTIVITY SCALE (PEG)
INTERFERED_GENERAL_ACTIVITY: 9
INTERFERED_ENJOYMENT_LIFE: 8
AVG_PAIN_PASTWEEK: 7
PEG_TOTALSCORE: 8
INTERFERED_ENJOYMENT_LIFE: 8
AVG_PAIN_PASTWEEK: 7
INTERFERED_GENERAL_ACTIVITY: 9
PEG_TOTALSCORE: 8

## 2024-02-09 ENCOUNTER — LAB (OUTPATIENT)
Dept: LAB | Facility: HOSPITAL | Age: 39
End: 2024-02-09
Attending: NURSE PRACTITIONER
Payer: MEDICAID

## 2024-02-09 ENCOUNTER — OFFICE VISIT (OUTPATIENT)
Dept: PALLIATIVE MEDICINE | Facility: OTHER | Age: 39
End: 2024-02-09
Attending: NURSE PRACTITIONER
Payer: MEDICAID

## 2024-02-09 VITALS
HEART RATE: 75 BPM | WEIGHT: 187 LBS | OXYGEN SATURATION: 96 % | DIASTOLIC BLOOD PRESSURE: 74 MMHG | BODY MASS INDEX: 32.1 KG/M2 | SYSTOLIC BLOOD PRESSURE: 122 MMHG

## 2024-02-09 DIAGNOSIS — M25.552 CHRONIC PAIN OF BOTH HIPS: ICD-10-CM

## 2024-02-09 DIAGNOSIS — M25.551 CHRONIC PAIN OF BOTH HIPS: ICD-10-CM

## 2024-02-09 DIAGNOSIS — Z79.891 USE OF OPIATES FOR THERAPEUTIC PURPOSES: Primary | ICD-10-CM

## 2024-02-09 DIAGNOSIS — G89.29 CHRONIC PAIN OF BOTH HIPS: ICD-10-CM

## 2024-02-09 LAB
CANNABINOIDS UR QL SCN: NORMAL
CREAT UR-MCNC: 76 MG/DL
CRP SERPL-MCNC: 5.4 MG/L
ETHANOL UR QL SCN: NORMAL
URATE SERPL-MCNC: 5 MG/DL (ref 2.4–5.7)
VIT D+METAB SERPL-MCNC: 16 NG/ML (ref 20–50)

## 2024-02-09 PROCEDURE — 80307 DRUG TEST PRSMV CHEM ANLYZR: CPT | Performed by: ANESTHESIOLOGY

## 2024-02-09 PROCEDURE — G0463 HOSPITAL OUTPT CLINIC VISIT: HCPCS | Performed by: ANESTHESIOLOGY

## 2024-02-09 PROCEDURE — 86140 C-REACTIVE PROTEIN: CPT

## 2024-02-09 PROCEDURE — 99204 OFFICE O/P NEW MOD 45 MIN: CPT | Performed by: ANESTHESIOLOGY

## 2024-02-09 PROCEDURE — 36415 COLL VENOUS BLD VENIPUNCTURE: CPT

## 2024-02-09 PROCEDURE — 81374 HLA I TYPING 1 ANTIGEN LR: CPT

## 2024-02-09 PROCEDURE — 84550 ASSAY OF BLOOD/URIC ACID: CPT

## 2024-02-09 PROCEDURE — 82306 VITAMIN D 25 HYDROXY: CPT

## 2024-02-09 PROCEDURE — 80360 METHYLPHENIDATE: CPT | Performed by: ANESTHESIOLOGY

## 2024-02-09 PROCEDURE — 80375 DRUG/SUBSTANCE NOS 1-3: CPT | Performed by: ANESTHESIOLOGY

## 2024-02-09 RX ORDER — BUPRENORPHINE 5 UG/H
1 PATCH TRANSDERMAL
Qty: 4 PATCH | Refills: 1 | Status: SHIPPED | OUTPATIENT
Start: 2024-02-09 | End: 2024-03-11

## 2024-02-09 RX ORDER — GABAPENTIN 300 MG/1
CAPSULE ORAL
Qty: 90 CAPSULE | Refills: 1 | Status: SHIPPED | OUTPATIENT
Start: 2024-02-09 | End: 2024-03-11

## 2024-02-09 ASSESSMENT — PAIN SCALES - GENERAL: PAINLEVEL: MILD PAIN (3)

## 2024-02-09 NOTE — PROGRESS NOTES
Patient presents to the clinic today for a visit with NATALY DUNCAN MD regarding Pain Management.    UDS/CSA- na    Medications- na    Notes    Radha Schneider  [facility] Clinical Assistant

## 2024-02-09 NOTE — LETTER
Opioid / Opioid Plus Controlled Substance Agreement    This is an agreement between you and your provider about the safe and appropriate use of controlled substance/opioids prescribed by your care team. Controlled substances are medicines that can cause physical and mental dependence (abuse).    There are strict laws about having and using these medicines. We here at [facility] are committing to working with you in your efforts to get better. To support you in this work, we ll help you schedule regular office appointments for medicine refills. If we must cancel or change your appointment for any reason, we ll make sure you have enough medicine to last until your next appointment.     As a Provider, I will:  Listen carefully to your concerns and treat you with respect.   Recommend a treatment plan that I believe is in your best interest. This plan may involve therapies other than opioid pain medication.   Talk with you often about the possible benefits, and the risk of harm of any medicine that we prescribe for you.   Provide a plan on how to taper (discontinue or go off) using this medicine if the decision is made to stop its use.    As a Patient, I understand that opioid(s):   Are a controlled substance prescribed by my care team to help me function or work and manage my condition(s).   Are strong medicines and can cause serious side effects such as:  Drowsiness, which can seriously affect my driving ability  A lower breathing rate, enough to cause death  Harm to my thinking ability   Depression   Abuse of and addiction to this medicine  Need to be taken exactly as prescribed. Combining opioids with certain medicines or chemicals (such as illegal drugs, sedatives, sleeping pills, and benzodiazepines) can be dangerous or even fatal. If I stop opioids suddenly, I may have severe withdrawal symptoms.  Do not work for all types of pain nor for all patients. If they re not helpful, I may be asked to stop  them.        The risks, benefits and side effects of these medicine(s) were explained to me. I agree that:  I will take part in other treatments as advised by my care team. This may be psychiatry or counseling, physical therapy, behavioral therapy, group treatment or a referral to a specialist.     I will keep all my appointments. I understand that this is part of the monitoring of opioids. My care team may require an office visit for EVERY opioid/controlled substance refill. If I miss appointments or don t follow instructions, my care team may stop my medicine.    I will take my medicines as prescribed. I will not change the dose or schedule unless my care team tells me to. There will be no refills if I run out early.     I may be asked to come to the clinic and complete a urine drug test or complete a pill count at any time. If I don t give a urine sample or participate in a pill count, the care team may stop my medicine.    I will only receive prescriptions from this clinic for chronic pain. If I am treated by another provider for acute pain issues, I will tell them that I am taking opioid pain medication for chronic pain and that I have a treatment agreement with this provider. I will inform my [facility] care team within one business day if I am given a prescription for any pain medication by another healthcare provider. My [facility] care team can contact other providers and pharmacists about my use of any medicines.    It is up to me to make sure that I don t run out of my medicines on weekends or holidays. If my care team is willing to refill my opioid prescription without a visit, I must request refills only during office hours. Refills may take up to 3 business days to process. I will use one pharmacy to fill all my opioid and other controlled substance prescriptions. I will notify the clinic about any changes to my insurance or medication availability.    I am responsible for my  prescriptions. If the medicine/prescription is lost, stolen or destroyed, it will not be replaced. I also agree not to share controlled substance medicines with anyone.    I am aware I should not use any illegal or recreational drugs. I agree not to drink alcohol unless my care team says I can.       If I enroll in the [place] [facility] Cann[facility] program, I will tell my care team prior to my next refill.     I will tell my care team right away if I become pregnant, have a new medical problem treated outside of my regular clinic, or have a change in my medications.    I understand that this medicine can affect my thinking, judgment and reaction time. Alcohol and drugs affect the brain and body, which can affect the safety of my driving. Being under the influence of alcohol or drugs can affect my decision-making, behaviors, personal safety, and the safety of others. Driving while impaired (DWI) can occur if a person is driving, operating, or in physical control of a car, motorcycle, boat, snowmobile, ATV, motorbike, off-road vehicle, or any other motor vehicle ([place] Statute 169A.20). I understand the risk if I choose to drive or operate any vehicle or machinery.    I understand that if I do not follow any of the conditions above, my prescriptions or treatment may be stopped or changed.          Opioids  What You Need to Know    What are opioids?   Opioids are pain medicines that must be prescribed by a doctor. They are also known as narcotics.     Examples are:   morphine (MS Contin, Siri)  oxycodone (Oxycontin)  oxycodone and acetaminophen (Percocet)  hydrocodone and acetaminophen (Vicodin, Norco)   fentanyl patch (Duragesic)   hydromorphone (Dilaudid)   methadone  codeine (Tylenol #3)     What do opioids do well?   Opioids are best for severe short-term pain such as after a surgery or injury. They may work well for cancer pain. They may help some people with long-lasting (chronic) pain.     What do opioids NOT do  well?   Opioids never get rid of pain entirely, and they don t work well for most patients with chronic pain. Opioids don t reduce swelling, one of the causes of pain.                                    Other ways to manage chronic pain and improve function include:     Treat the health problem that may be causing pain  Anti-inflammation medicines, which reduce swelling and tenderness, such as ibuprofen (Advil, Motrin) or naproxen (Aleve)  Acetaminophen (Tylenol)  Antidepressants and anti-seizure medicines, especially for nerve pain  Topical treatments such as patches or creams  Injections or nerve blocks  Chiropractic or osteopathic treatment  Acupuncture, massage, deep breathing, meditation, visual imagery, aromatherapy  Use heat or ice at the pain site  Physical therapy   Exercise  Stop smoking  Take part in therapy       Risks and side effects     Talk to your doctor before you start or decide to keep taking opioids. Possible side effects include:    Lowering your breathing rate enough to cause death  Overdose, including death, especially if taking higher than prescribed doses  Worse depression symptoms; less pleasure in things you usually enjoy  Feeling tired or sluggish  Slower thoughts or cloudy thinking  Being more sensitive to pain over time; pain is harder to control  Trouble sleeping or restless sleep  Changes in hormone levels (for example, less testosterone)  Changes in sex drive or ability to have sex  Constipation  Unsafe driving  Itching and sweating  Dizziness  Nausea, throwing up and dry mouth    What else should I know about opioids?    Opioids may lead to dependence, tolerance, or addiction.    Dependence means that if you stop or reduce the medicine too quickly, you will have withdrawal symptoms. These include loose poop (diarrhea), jitters, flu-like symptoms, nervousness and tremors. Dependence is not the same as addiction.                     Tolerance means needing higher doses over time to  get the same effect. This may increase the chance of serious side effects.    Addiction is when people improperly use a substance that harms their body, their mind or their relations with others. Use of opiates can cause a relapse of addiction if you have a history of drug or alcohol abuse.    People who have used opioids for a long time may have a lower quality of life, worse depression, higher levels of pain and more visits to doctors.    You can overdose on opioids. Take these steps to lower your risk of overdose:    Recognize the signs:  Signs of overdose include decrease or loss of consciousness (blackout), slowed breathing, trouble waking up and blue lips. If someone is worried about overdose, they should call 911.    Talk to your doctor about Narcan (naloxone).   If you are at risk for overdose, you may be given a prescription for Narcan. This medicine very quickly reverses the effects of opioids.   If you overdose, a friend or family member can give you Narcan while waiting for the ambulance. They need to know the signs of overdose and how to give Narcan.     Don't use alcohol or street drugs.   Taking them with opioids can cause death.    Do not take any of these medicines unless your doctor says it s OK. Taking these with opioids can cause death:  Benzodiazepines, such as lorazepam (Ativan), alprazolam (Xanax) or diazepam (Valium)  Muscle relaxers, such as cyclobenzaprine (Flexeril)  Sleeping pills like zolpidem (Ambien)   Other opioids      How to keep you and other people safe while taking opioids:    Never share your opioids with others.  Opioid medicines are regulated by the Drug Enforcement Agency ([place]). Selling or sharing medications is a criminal act.    2. Be sure to store opioids in a secure place, locked up if possible. Young children can easily swallow them and overdose.    3. When you are traveling with your medicines, keep them in the original bottles. If you use a pill box, be sure you also  carry a copy of your medicine list from your clinic or pharmacy.    4. Safe disposal of opioids    Most pharmacies have places to get rid of medicine, called disposal kiosks. Medicine disposal options are also available in every [facility]. Search your county and  medication disposal  to find more options. You can find more details at:  https://www.pca.[facility].[place]./living-green/managing-unwanted-medications     I agree that my provider, clinic care team, and pharmacy may work with any city, state or federal law enforcement agency that investigates the misuse, sale, or other diversion of my controlled medicine. I will allow my provider to discuss my care with, or share a copy of, this agreement with any other treating provider, pharmacy or emergency room where I receive care.    I have read this agreement and have asked questions about anything I did not understand.    _______________________________________________________  Patient Signature - Judy Peterson _____________________                   Date     _______________________________________________________  Provider Signature - NATALY DUNCAN MD   _____________________                   Date     _______________________________________________________  Witness Signature (required if provider not present while patient signing)   _____________________                   Date

## 2024-02-09 NOTE — PATIENT INSTRUCTIONS
"PLAN:    Vitamin D level at [facility].    Discussed the importance of decreasing smoking for inflammation and eventual surgery.    Discussed the importance of avoiding high fructose corn syrup and sugar that contribute to inflammation.    Gabapentin for the diffuse hip pain, 300 mg capsule 1 twice a day for 5 days, may increase to 3 times a day.    Diclofenac cream may apply to hips 3 times a day.    Discussed the role of frequency specific microcurrent  May contact these providers to see if they take your insurance or you want to work with transitions [phone], [facility] [phone], [facility] [phone], [facility] [phone].    Discussed the role of buprenorphine to help with pain.  Will use a Butrans patch 5 mcg weekly.      Cussed topical preparations of CBD products, 1 is \"Medosi\" with CBD and Arnica.  May obtain online.    Follow-up with HEATHER Arellano pharmacist in 2 to 3 weeks to address above changes.    Follow-up with Dr. Campbell in 6 to 8 weeks for return visit in the clinic  "

## 2024-02-09 NOTE — PROGRESS NOTES
[facility] Consultation    Date of visit: 2/9/2024    Reason for consultation:    Judy Peterson is a 38 year old female who is seen in consultation today at the request of Luz Carrasco NP whose 1/8/2023 note indicates chronic hip pain, history of hip dysplasia, has seen orthopedics, with cortisone injections and physical therapy.  Pain with sitting and standing.      38-year-old female living in [place] with her boyfriend.  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  No children.  Accompanied by her mother Quynh, [phone]      Chief Complaint:    Chief Complaint   Patient presents with    Pain     Records including evaluation 1/24/2024 with [facility], with bilateral severe hip osteoarthritis, secondary to hip dysplasia.  Surgery is offered which she is ready.    She reviews having hip pain for several years.  She had fallen off a ladder several years previously though when he had a pain had been valuation told she had hip dysplasia.  Over time has tried physical therapy which did not help.  Steroid injections did not help.  Recently saw [facility], has been offered surgery.  She cannot take time off work yet until perhaps December so she is asking for help managing until then.    She is applying for Social Security disability with poor function.    Describes pain involves her hips, tender to touch in that area.  Rotates into her knees up into her back.  It is painful when sitting.  It is painful lying down flat at night.  At work she tries to sit a lot and stretch.  Limits her walking.    She did have a rheumatologic eval rations in the past told she has osteoarthritis.  Orthopedics did order laboratories in February 2023 showing C-reactive protein 0.8, rheumatoid factor XX 5.  C-reactive protein    She was scheduled to see a rheumatologist in July, did not show.  Another rheumatology appointment in August was canceled.  Appears  her primary care provider is trying to make another referral.  Patient reports is told she has osteoarthritis not rheumatoid arthritis in the past.    Tylenol does not help.  Meloxicam initially seems helpful but did not.  Has not tried CBD products.  Has not tried other medications.  Has not tried acupuncture as she is afraid of needles.    As noted sleep disrupted by pain.  No sleep apnea.  Appetite is stable, weight 185.  She acknowledges drinking at least a can of soda a day.  Has PKU so as to follow particular diet needing to avoid dairy and meat.    Energy has been low for many years.  No anxiety or depression.    Chart reflects being evaluated for ADD.    Does not report mold exposure, Lyme disease.    Cigarette use, 1/2 to 1 pack/day.  Has not been told this can relate to joint degeneration and other conditions.  We reviewed this, as well as concern for heavy metals and toxicity.    Does not use alcohol.    Denies other medical problems aside from PKU which was diagnosed at birth    Past Medical History:  Past Medical History:   Diagnosis Date    Anxiety     Uncomplicated asthma      Patient Active Problem List    Diagnosis Date Noted    Acute bacterial rhinosinusitis 12/04/2023     Priority: Medium    Encounter for tobacco use cessation counseling 12/04/2023     Priority: Medium    Mild persistent asthma without complication 07/31/2020     Priority: Medium    Attention deficit hyperactivity disorder (ADHD)      Priority: Medium     Created by Conversion        Smoking 10/04/2016     Priority: Medium    Anxiety      Priority: Medium     Created by Conversion  Replacement Utility updated for latest IMO load        Phenylketonuria      Priority: Medium     Created by Conversion           Past Surgical History:  Past Surgical History:   Procedure Laterality Date    HERNIA REPAIR Right      Medications:  Current Outpatient Medications   Medication Sig Dispense Refill    albuterol (PROAIR HFA/PROVENTIL  HFA/VENTOLIN HFA) 108 (90 Base) MCG/ACT inhaler Inhale 2 puffs into the lungs every 4 hours as needed for shortness of breath or wheezing 18 g 1    budesonide-formoterol (SYMBICORT) 160-4.5 MCG/ACT Inhaler Inhale 2 puffs into the lungs 2 times daily 10.2 g 11    buprenorphine (BUTRANS) 5 MCG/HR WK patch Place 1 patch onto the skin every 7 days 4 patch 1    diclofenac (VOLTAREN) 1 % topical gel Apply 2 g topically 4 times daily 50 g 1    gabapentin (NEURONTIN) 300 MG capsule One capsule twice a day 5 days, may increase to three times a day 90 capsule 1    montelukast (SINGULAIR) 10 MG tablet Take 1 tablet (10 mg) by mouth at bedtime 90 tablet 3    valACYclovir (VALTREX) 1000 mg tablet TAKE TWO TABLETS BY MOUTH TWICE A DAY 4 tablet 11     Allergies:     Allergies   Allergen Reactions    Cefprozil Hives       Social/Developmental HX: Patient [place].  No siblings.  Father not involved.  Completed high school.  Has worked at [employer] for some 13 years.  Hobbies include swimming in the summer.  Not involved in Yazidism.  Asked while mother present, denies issues abuse or trauma    Family history:  Family History   Problem Relation Age of Onset    Rheumatoid Arthritis Mother    Alert, clear sensorium    Physical Exam:  Vitals:    02/09/24 1053   BP: 122/74   Pulse: 75   SpO2: 96%   Weight: 84.8 kg (187 lb)     Exam:    Alert, clear sensorium.  No respiratory distress.    When gets up from sitting takes a couple steps and grimaces stating the pain flares up.    Very tender to palpation in the region of her hips, thighs, lower back.  Upon beginning to flex indicates.  Comfortable.  Mild internal/external rotation of the hip.  Painful.      Assessment:    Osteoarthritis of the hip, previous history of hip dysplasia, has been offered surgery.    Did not respond to physical therapy, injections.    Brief course of nonsteroidals with meloxicam not helpful.  No side effects.    Laboratories 1 year ago showed mild  inflammation, was to see rheumatologist over the summer.  Primary care provider make a referral again     Plan: Discussed concerns with ongoing smoking.    Will obtain updated vitamin D level and other inflammatory markers.    Discussed portance of anti-inflammatory diet.    For the diffuse sensitization discussed the use of gabapentin.  Possible side effects discussed.     Discussed topical diclofenac, and topical CBD preparations.    Reviewed the role of low-dose opioids, to support her function until surgery.  Reviewed buprenorphine, and the Butrans patch.  She would like to proceed.    Discussed the role of frequency specific microcurrent and resources.    Total time 55 minutes minutes spent on the date of encounter doing chart review, history, and exam documentation and further activities as noted above.     Sudhakar Campbell MD  [facility]      Answers submitted by the patient for this visit:  ANTHONY-7 (Submitted on 2/8/2024)  ANTHONY 7 TOTAL SCORE: 2

## 2024-02-13 DIAGNOSIS — E55.9 VITAMIN D DEFICIENCY: Primary | ICD-10-CM

## 2024-02-13 LAB
B LOCUS: NORMAL
B27TEST METHOD: NORMAL

## 2024-02-13 RX ORDER — ERGOCALCIFEROL 1.25 MG/1
50000 CAPSULE, LIQUID FILLED ORAL WEEKLY
Qty: 12 CAPSULE | Refills: 0 | Status: SHIPPED | OUTPATIENT
Start: 2024-02-13 | End: 2024-06-03

## 2024-02-13 NOTE — RESULT ENCOUNTER NOTE
Urine drug testing as expected. No illicit medication or alcohol noted. Continue standard monitoring while on opiates.   Radha PEREZ RN CNP, FNP  [facility]

## 2024-02-14 ENCOUNTER — TELEPHONE (OUTPATIENT)
Dept: FAMILY MEDICINE | Facility: CLINIC | Age: 39
End: 2024-02-14
Payer: MEDICAID

## 2024-02-14 NOTE — TELEPHONE ENCOUNTER
----- Message from CHRIS Saavedra CNP sent at 2/13/2024  8:09 PM CST -----  Regarding: FW: low vitamin D  Please notify the patient that the pain clinic reached out to me regarding her low vitamin D. This can cause fatigue and body aches. I sent a prescription to the pharmacy for Vitamin D 43056 units once weekly for 12 weeks.  I recommend a lab recheck then.  Once she completes the high dose, I recommend she take 2000 units of Vitamin D3 daily which she can buy over-the-counter.  Thanks.        ----- Message -----  From: Radha Gonzalez APRN CNP  Sent: 2/13/2024   5:55 PM CST  To: CHRIS Saavedra CNP  Subject: low vitamin D                                    Mariann Ho vitamin D level is quite low.     Please consider managing Vitamin D replacement. This may also help to manage her pain, etc.     Thanks.   Radha PEREZ, RN CNP, FNP  [facility]  [facility]

## 2024-02-14 NOTE — TELEPHONE ENCOUNTER
Informed pt of info and she verbalized understanding. Patient stated she is having insurance issues at the moment and not sure when she will be able to get insurance coverage. Informed pt about GoodRx and she verbalized understanding.    RUPALI PollackN, RN  [facility]

## 2024-03-04 ENCOUNTER — TELEPHONE (OUTPATIENT)
Dept: PALLIATIVE MEDICINE | Facility: OTHER | Age: 39
End: 2024-03-04
Payer: COMMERCIAL

## 2024-03-04 NOTE — TELEPHONE ENCOUNTER
Fax received from     [address] [place] [address]  [address] [place] [id number]  Phone: [phone] Fax: [phone]    PA needed for buprenorphine (BUTRANS) 5 MCG/HR WK patch    CoverMyMeds Key: [id number]

## 2024-03-08 NOTE — PROGRESS NOTES
Medication Therapy Management (MTM) Encounter    ASSESSMENT:                            Medication Adherence/Access: See plan below.         Chronic Pain:   Unclear why patient wasn't able to get gabapentin. Prescription resent today.   Butrans is not preferred by [facility]. Belbuca is preferred. Discussed with patient that both are the same active ingredient. Reviewed how to use Belbuca films.       Low Vitamin D:   Appropriately using Vit D supplement. Consider repeat levels after 12 weeks of therapy (mid June).     Asthma:  Last ACT not at goal >19. Not using Symbicort daily. Education provided. Will resend inhalers and allow for additional PRN symbicort doses (SMART therapy). Reviewed importance of nicotine cessation. Patient has patches, but isn't ready to quit. Will continue to ask and assess readiness.         PLAN:                            Stop Butrans. Switch to Belbuca 150 mcg twice daily   Refill Gabapentin   Use Symbicort 160-4.5 mcg 2 puffs twice daily every day. You can use an additional 1-2 puffs as needed for shortness of breath. Max 12 puffs per day.     Follow-up: Return in about 8 weeks (around 5/6/2024) for Medication Management Pharmacist, in person.  4/1 with Dr. Campbell,     SUBJECTIVE/OBJECTIVE:                          Judy Peterson is a 38 year old female coming in for an initial visit. She was referred to me from Sudhakar Campbell MD.      Reason for visit: Pain Follow-up .    Allergies/ADRs: Reviewed in chart  Past Medical History: Reviewed in chart  Tobacco: She reports that she has been smoking cigarettes. She has been smoking an average of 0.5 packs per day. She has been exposed to tobacco smoke. She has never used smokeless tobacco.Nicotine/Tobacco Cessation Plan   Pharmacotherapies : Nicotine patch  Alcohol:  reports no history of alcohol use.         Medication Adherence/Access:     Had a gap in her insurance. Couldn't get things filled until 3/1. When she went to the pharmacy  wasn't able to get pain medicines.         Chronic Pain:   Recently established with Dr. Campbell.  Started on:  Butrans 5 mcg/h patch weekly  Diclofenac 1% topical gel 2 g 4 times daily  Gabapentin 300 mg twice daily for 5 days then 3 times daily    Didn't get Gabapentin or Butrans.   Butrans requires a PA. Belbuca is preferred.     Diclofenac not particularly helpful because it rubs off on her clothes. And it stinks.     Pain is worse on hard floors. Carpeted floors aren't so bad. Hip pain, knee pain. Can't sleep well. Has days of difficulty walking.     Meloxicam initially helped, but less effective over time.       Low Vitamin D:   Vitamin D2 50,000 units once weekly.     Vitamin D Deficiency Screening Results:  Lab Results   Component Value Date    VITDT 16 (L) 02/09/2024        Asthma:  Montelukast 10 mg daily  Symbicort 160-4.5 mcg 2 puffs twice daily  Albuterol HFA 90 mcg 2 puffs every 4 hours as needed    Doesn't have albuterol inhaler.       Gets shortness of breath with just walking into the clinic.   Usually only using Symbicort as needed.           1/7/2022     1:43 PM 11/21/2023    11:23 AM 1/8/2024    11:35 AM   ACT Total Scores   ACT TOTAL SCORE (Goal Greater than or Equal to 20) 14 17 15   In the past 12 months, how many times did you visit the emergency room for your asthma without being admitted to the hospital? 0 0 0   In the past 12 months, how many times were you hospitalized overnight because of your asthma? 0 0 0           Today's Vitals: /87   Pulse 79   LMP 01/30/2024 (Approximate)   ----------------      I spent 35 minutes with this patient today. All changes were made via collaborative practice agreement with Sudhakar Campbell MD and CHRIS Saavedra CNP. A copy of the visit note was provided to the patient's provider(s).    A summary of these recommendations was sent via [employer].    Arya Tubbs, SamanD  Medication Therapy Management (MTM) Pharmacist  [facility]           Medication Therapy Recommendations  Chronic pain of both hips    Current Medication: buprenorphine (BUTRANS) 5 MCG/HR WK patch (Discontinued)   Rationale: Cannot afford medication product - Cost - Adherence   Recommendation: Change Medication Formulation  - Belbuca 150 MCG Film   Status: Accepted per Provider          Current Medication: gabapentin (NEURONTIN) 300 MG capsule   Rationale: Medication product not available - Adherence - Adherence   Recommendation: Provide Adherence Intervention   Status: Accepted per CPA         Mild persistent asthma without complication    Current Medication: budesonide-formoterol (SYMBICORT) 160-4.5 MCG/ACT Inhaler   Rationale: Dose too low - Dosage too low - Effectiveness   Recommendation: Increase Frequency   Status: Accepted per CPA

## 2024-03-11 ENCOUNTER — OFFICE VISIT (OUTPATIENT)
Dept: PHARMACY | Facility: OTHER | Age: 39
End: 2024-03-11
Payer: COMMERCIAL

## 2024-03-11 VITALS — DIASTOLIC BLOOD PRESSURE: 87 MMHG | SYSTOLIC BLOOD PRESSURE: 133 MMHG | HEART RATE: 79 BPM

## 2024-03-11 DIAGNOSIS — M25.552 CHRONIC PAIN OF BOTH HIPS: ICD-10-CM

## 2024-03-11 DIAGNOSIS — J45.41 MODERATE PERSISTENT ASTHMA WITH EXACERBATION: ICD-10-CM

## 2024-03-11 DIAGNOSIS — G89.29 CHRONIC PAIN OF BOTH HIPS: Primary | ICD-10-CM

## 2024-03-11 DIAGNOSIS — M25.551 CHRONIC PAIN OF BOTH HIPS: ICD-10-CM

## 2024-03-11 DIAGNOSIS — M25.551 CHRONIC PAIN OF BOTH HIPS: Primary | ICD-10-CM

## 2024-03-11 DIAGNOSIS — E55.9 VITAMIN D DEFICIENCY: ICD-10-CM

## 2024-03-11 DIAGNOSIS — G89.29 CHRONIC PAIN OF BOTH HIPS: ICD-10-CM

## 2024-03-11 DIAGNOSIS — M25.552 CHRONIC PAIN OF BOTH HIPS: Primary | ICD-10-CM

## 2024-03-11 PROCEDURE — 99605 MTMS BY PHARM NP 15 MIN: CPT | Performed by: PHARMACIST

## 2024-03-11 PROCEDURE — 99607 MTMS BY PHARM ADDL 15 MIN: CPT | Performed by: PHARMACIST

## 2024-03-11 RX ORDER — ALBUTEROL SULFATE 90 UG/1
2 AEROSOL, METERED RESPIRATORY (INHALATION) EVERY 4 HOURS PRN
Qty: 18 G | Refills: 1 | Status: SHIPPED | OUTPATIENT
Start: 2024-03-11

## 2024-03-11 RX ORDER — BUDESONIDE AND FORMOTEROL FUMARATE DIHYDRATE 160; 4.5 UG/1; UG/1
AEROSOL RESPIRATORY (INHALATION)
Qty: 20.4 G | Refills: 11 | Status: SHIPPED | OUTPATIENT
Start: 2024-03-11

## 2024-03-11 RX ORDER — GABAPENTIN 300 MG/1
CAPSULE ORAL
Qty: 90 CAPSULE | Refills: 1 | Status: SHIPPED | OUTPATIENT
Start: 2024-03-11 | End: 2024-07-15

## 2024-03-11 NOTE — PATIENT INSTRUCTIONS
"Recommendations from today's MTM visit:                                                      MTM (medication therapy management) is a service provided by a clinical pharmacist designed to help you get the most of out of your medicines.   Today we reviewed what your medicines are for, how to know if they are working, that your medicines are safe and how to make your medicine regimen as easy as possible.      Stop Butrans. Switch to Belbuca 150 mcg twice daily   Refill Gabapentin   Use Symbicort 160-4.5 mcg 2 puffs twice daily every day. You can use an additional 1-2 puffs as needed for shortness of breath. Max 12 puffs per day.     Follow-up: Return in about 8 weeks (around 5/6/2024) for Medication Management Pharmacist, in person.    It was great speaking with you today.  I value your experience and would be very thankful for your time in providing feedback in our clinic survey. In the next few days, you may receive an email or text message from [employer] with a link to a survey related to your  clinical pharmacist.\"     To schedule another MTM appointment, please call the clinic directly or you may call the MTM scheduling line at [phone].    My Clinical Pharmacist's contact information:                                                      Please feel free to contact me with any questions or concerns you have.      Arya Tubbs, PharmD  Medication Therapy Management (MTM) Pharmacist  [facility]      "

## 2024-03-11 NOTE — TELEPHONE ENCOUNTER
Switch from [facility] to [facility] per insurance preference.     Next visits;   Dr. Campbell 4/1   MTM: 5/6

## 2024-03-12 ENCOUNTER — TELEPHONE (OUTPATIENT)
Dept: PALLIATIVE MEDICINE | Facility: OTHER | Age: 39
End: 2024-03-12
Payer: COMMERCIAL

## 2024-03-12 NOTE — TELEPHONE ENCOUNTER
PA Initiation    Medication: BELBUCA 150 MCG  FILM  Insurance Company: [employer] - Phone [phone] Fax [phone]  Pharmacy Filling the Rx: [address] [place] - [address] [place] [address]  Filling Pharmacy Phone: [phone]  Filling Pharmacy Fax:    Start Date: 3/12/2024

## 2024-03-12 NOTE — TELEPHONE ENCOUNTER
Prior Authorization Retail Medication Request    Medication/Dose: Buprenorphine HCl (BELBUCA) 150 MCG FILM buccal film  Diagnosis and ICD code (if different than what is on RX):    New/renewal/insurance change PA/secondary ins. PA:  Previously Tried and Failed:    Rationale:      Insurance   KEY [id number]    Pharmacy Information       [facility], [address] [address]  Phone: [phone] Fax: [phone]

## 2024-03-13 NOTE — TELEPHONE ENCOUNTER
Prior Authorization Approval    Medication: BELBUCA 150 MCG BU FILM  Authorization Effective Date: 3/13/2024  Authorization Expiration Date: 3/12/2025  Approved Dose/Quantity: 60/30  Reference #: [id number]   Insurance Company: [employer] - Phone [phone] Fax [phone]  Expected CoPay: $    CoPay Card Available:      Financial Assistance Needed:   [facility] [address] [place] [address]  Pharmacy Notified: Yes  Patient Notified: Yes

## 2024-04-01 ENCOUNTER — OFFICE VISIT (OUTPATIENT)
Dept: PALLIATIVE MEDICINE | Facility: OTHER | Age: 39
End: 2024-04-01
Payer: COMMERCIAL

## 2024-04-01 VITALS
DIASTOLIC BLOOD PRESSURE: 76 MMHG | BODY MASS INDEX: 32.44 KG/M2 | OXYGEN SATURATION: 97 % | WEIGHT: 189 LBS | HEART RATE: 92 BPM | SYSTOLIC BLOOD PRESSURE: 118 MMHG

## 2024-04-01 DIAGNOSIS — G89.29 CHRONIC PAIN OF BOTH HIPS: Primary | ICD-10-CM

## 2024-04-01 DIAGNOSIS — M25.552 CHRONIC PAIN OF BOTH HIPS: Primary | ICD-10-CM

## 2024-04-01 DIAGNOSIS — M25.551 CHRONIC PAIN OF BOTH HIPS: Primary | ICD-10-CM

## 2024-04-01 PROCEDURE — G0463 HOSPITAL OUTPT CLINIC VISIT: HCPCS | Performed by: ANESTHESIOLOGY

## 2024-04-01 PROCEDURE — 99214 OFFICE O/P EST MOD 30 MIN: CPT | Performed by: ANESTHESIOLOGY

## 2024-04-01 RX ORDER — ACETAMINOPHEN 500 MG
500-1000 TABLET ORAL PRN
COMMUNITY

## 2024-04-01 RX ORDER — BUPRENORPHINE AND NALOXONE 2; .5 MG/1; MG/1
FILM, SOLUBLE BUCCAL; SUBLINGUAL
Qty: 28 FILM | Refills: 2 | Status: SHIPPED | OUTPATIENT
Start: 2024-04-01 | End: 2024-04-06

## 2024-04-01 ASSESSMENT — PAIN SCALES - GENERAL: PAINLEVEL: SEVERE PAIN (6)

## 2024-04-01 NOTE — PROGRESS NOTES
Patient presents to the clinic today for a visit with NATALY DUNCAN MD regarding Pain Management.          4/1/2024     1:25 PM   PEG Score   PEG Total Score 9.33       UDS/CSA- 02.09.2024    Medications- Belbuca last taken at 10 am    Notes taste and texture of belbuca bothers her. She believes it helped better at first and now not as well.     Radha Schneider  [facility] Clinical Assistant

## 2024-04-01 NOTE — PATIENT INSTRUCTIONS
[facility] Number:  [phone]  Call with any questions about your care and for scheduling assistance.   Calls are returned Monday through Friday between 8 AM and 4:30 PM. We usually get back to you within 2 business days depending on the issue/request.    If we are prescribing your medications:  For opioid medication refills, call the clinic or send a [employer] message 7 days in advance.  Please include:  Name of requested medication  Name of the pharmacy.  For non-opioid medications, call your pharmacy directly to request a refill. Please allow 3-4 days to be processed.   Per [place] State Law:  All controlled substance prescriptions must be filled within 30 days of being written.    For those controlled substances allowing refills, pickup must occur within 30 days of last fill.      We believe regular attendance is key to your success in our program!    Any time you are unable to keep your appointment we ask that you call us at least 24 hours in advance to cancel.This will allow us to offer the appointment time to another patient.   Multiple missed appointments may lead to dismissal from the clinic.     PLAN:    May change the Belbuca to Suboxone 2 mg film, start with one third of a film under tongue twice a day.  After 5 days if still pain and tolerating increase to one half film twice a day.  After 10 days if needed may increase to 1 film twice a day.  Call when due for refill to discuss best dosing to continue    You will continue with your vitamin D replacement, recommend obtaining a level after you have been on this 2 weeks to see if you are optimizing.    Continue efforts decreasing smoking.    Discussed the role of red light therapy to help with the arthritis.  Resources given.    Discussed the role of frequency specific microcurrent to help with the left hip pain to support while continue working.  May contact transitions and health will also does  acupuncture at [phone].    Follow-up with Dr. Campbell for a return visit in 2 to 3 months

## 2024-04-01 NOTE — PROGRESS NOTES
[facility] Follow-up    Date of visit: 4/1/2024    Chief complaint:   Chief Complaint   Patient presents with    Pain     Follow-up for osteoarthritis of the hip.  Interval history:    Was last seen working with our MTM pharmacist, insurance did not cover Butrans, using the Belbuca.    She reviews today Belbuca was very helpful for the first week.  She felt almost normal.  Since then not quite the same benefit with pain.  She really struggles also with the texture of the film, does not know if can continue.    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  Stressful work.  She feels she does not have a choice however is needs to keep working.    Concerned about surgery as she does not have any coverage for recovery that could be 6 to 8 weeks.    She is applying to disability, did get a , told would not get a hearing for the year.  I also reviewed with her that if this is a condition for which surgery has been recommended, this may be seen as more of an impairment than a disability.    Vitamin D level was found quite low at 16.  She is on weekly doses of replacement through her primary.  We discussed the importance of optimize at a level closer to 60-80.  She is still smoking 10 cigarettes a day when she is at work.  We discussed concerns with smoking affecting circulation and healing.    She did not look into the frequency specific microcurrent, reviewed that could support her with symptoms and discussed the [facility] that does acupuncture that may take her insurance.    Family members have talked her about red light therapy.  Reviewed that indeed it could be helpful for pain and inflammation.  Discussed some resources to obtain a panel on her own.    Last urine drug test done in February.   reviewed            Medications:  Current Outpatient Medications   Medication Sig Dispense Refill    acetaminophen (TYLENOL) 500 MG tablet Take  500-1,000 mg by mouth as needed for mild pain      albuterol (PROAIR HFA/PROVENTIL HFA/VENTOLIN HFA) 108 (90 Base) MCG/ACT inhaler Inhale 2 puffs into the lungs every 4 hours as needed for shortness of breath or wheezing 18 g 1    budesonide-formoterol (SYMBICORT) 160-4.5 MCG/ACT Inhaler Inhale 2 puffs twice daily plus 1-2 puffs as needed. May use up to 12 puffs per day. 20.4 g 11    buprenorphine HCl-naloxone HCl (SUBOXONE) 2-0.5 MG per film One-half film under tongue twice a day 28 Film 2    diclofenac (VOLTAREN) 1 % topical gel Apply 2 g topically 4 times daily 50 g 1    gabapentin (NEURONTIN) 300 MG capsule One capsule twice a day 5 days, may increase to three times a day 90 capsule 1    meloxicam (MOBIC) 7.5 MG tablet       montelukast (SINGULAIR) 10 MG tablet Take 1 tablet (10 mg) by mouth at bedtime 90 tablet 3    valACYclovir (VALTREX) 1000 mg tablet TAKE TWO TABLETS BY MOUTH TWICE A DAY 4 tablet 11    vitamin D2 (ERGOCALCIFEROL) 20150 units (1250 mcg) capsule Take 1 capsule (50,000 Units) by mouth once a week 12 capsule 0           Physical Exam:  Blood pressure 118/76, pulse 92, weight 85.7 kg (189 lb), last menstrual period 01/30/2024, SpO2 97%, not currently breastfeeding.    Alert, clear sensorium.  No respiratory distress, no pain behavior.  Ambulates with a cane.    Affect congruent          Assessment:   History of hip pain, dysplasia, surgery has been offered.  She is concerned due to the costs with her time and recovery away from work without other financial supports    Note some initial benefit with buprenorphine in the form of Belbuca, lost some of the benefit.  Does not care for the texture.    Discussed plan to change to Suboxone, different form of the buprenorphine.  Discussed may need to start with smaller amounts than 2 mg film and will take with another person present.    Reviewed other strategies above to support as well.    Total time 32 minutes       minutes spent on the date of  encounter doing chart review, history, and exam documentation and further activities as noted above.     Sudhakar Campbell MD  [facility]

## 2024-04-06 DIAGNOSIS — K08.89 PAIN, DENTAL: ICD-10-CM

## 2024-04-06 DIAGNOSIS — M25.551 CHRONIC PAIN OF BOTH HIPS: ICD-10-CM

## 2024-04-06 DIAGNOSIS — M25.552 CHRONIC PAIN OF BOTH HIPS: ICD-10-CM

## 2024-04-06 DIAGNOSIS — G89.29 CHRONIC PAIN OF BOTH HIPS: ICD-10-CM

## 2024-04-06 RX ORDER — ONDANSETRON 4 MG/1
4 TABLET, ORALLY DISINTEGRATING ORAL EVERY 6 HOURS PRN
Qty: 15 TABLET | Refills: 0 | Status: SHIPPED | OUTPATIENT
Start: 2024-04-06

## 2024-04-06 NOTE — TELEPHONE ENCOUNTER
Called via Answering Service regarding medication reaction. Reviewed 4/1/24 office note and  before returning call.     Started Suboxone today. Has been throwing up all day since the first dose this morning. Retching during this call. Had eaten before she started Suboxone. Cannot keep anything, solids or liquids down.    Did not have this reaction to Belbuca, possible sensitivity to Naloxone.   I did not add Naloxone to allergy list. Will leave to Dr Campbell to decide if this should be added.     Ondansetron 4 mg #15 tabs, take 2 tabs with first dose sent to her pharmacy. Instructed to stay NPO except small sips of water, Sprite/7-up or ginger ale until vomiting stops. Ok to resume Belbuca when her stomach settles down.     Advised to go to urgent care or ER if Zofran is not effective and vomiting does not stop. May need IV/IM dose of antiemetic if PO doses are not effective. Advised to call me back via answering service If needed.     Routing to Dr Campbell and RN pool to follow up on Monday.

## 2024-04-18 DIAGNOSIS — F81.9 LEARNING DIFFICULTY: Primary | ICD-10-CM

## 2024-04-22 DIAGNOSIS — Z71.6 ENCOUNTER FOR TOBACCO USE CESSATION COUNSELING: ICD-10-CM

## 2024-04-22 RX ORDER — NICOTINE 21 MG/24HR
PATCH, TRANSDERMAL 24 HOURS TRANSDERMAL
Qty: 30 PATCH | Refills: 1 | Status: SHIPPED | OUTPATIENT
Start: 2024-04-22

## 2024-05-02 NOTE — PROGRESS NOTES
Medication Therapy Management (MTM) Encounter    ASSESSMENT:                            Medication Adherence/Access: No issues identified    Chronic Pain:   Did not tolerate Suboxone. Has not restarted Belbuca. Question if reaction to Suboxone was more due to large increase in dosage vs actual adverse effects to active ingredients. Patient encouraged to restart Belbuca as it was somewhat helpful. Long-term will try to Butrans patches covered as she's not tolerating oral formulations.      Using higher than recommended doses of Acetaminophen. Educated on safe dosing.     Low Vitamin D:   Taking Vitamin D supplement as prescribed, repeat level next month in June 2024.     Asthma:  Last ACT not at goal > 19. Is now using symbicort daily in the morning and as needed throughout the day. Reviewed importance of smoking cessation, is ready to quit, has nicotine patches and will send for nicotine lozenges to use as needed.        PLAN:                            Restart belbuca 300 mcg twice daily, will put in prior authorization for Butrans patch.  Limit tylenol 650 mg to max of 2 tablets at time up to four times daily (max of 4000 mg per day).    Start Nicotine 2 mg lozenges as needed for cigarette cravings.     Follow-up: Return in about 4 weeks (around 6/3/2024) for Medication Management Pharmacist, in person.    SUBJECTIVE/OBJECTIVE:                          Judy Peterson is a 38 year old female coming in for a follow-up visit from 3/11/24.      Reason for visit: belbuca follow up.    Allergies/ADRs: Reviewed in chart  Past Medical History: Reviewed in chart  Tobacco: She reports that she has been smoking cigarettes. She has been exposed to tobacco smoke. She has never used smokeless tobacco.Nicotine/Tobacco Cessation Plan  Pharmacotherapies : Nicotine patch - not currently using, planning on starting soon  Alcohol: reports no history of alcohol use     Medication Adherence/Access: no issues reported     Chronic Pain:    Belbuca 300 mcg twice daily   Diclofenac 1% topical gel 2 g 4 times daily  Gabapentin 300 mg three times daily   Acetaminophen 650 mg tab - 2 tablets twice a day, more if she's in pain, takes 4 at a time     Not currently taking belbuca, makes her nauseous, just taking tylenol and gabapentin, doesn't like the texture of the the films. She states pain has been manageable with stopping Suboxone, but pain is everywhere, achy knees, painful to the touch, difficulty walking.     Also having difficulty sleeping due to pain. Not taking meloxicam currently. Brought with suboxone and wants to get rid of it. Diclofenac not particularly helpful because it rubs off on her clothes.      Low Vitamin D:   Vitamin D2 50,000 units once weekly.      Vitamin D Deficiency Screening Results:  Lab Results   Component Value Date    VITDT 16 (L) 02/09/2024      Asthma:  Montelukast 10 mg daily  Symbicort 160-4.5 mcg 2 puffs twice daily  Albuterol HFA 90 mcg 2 puffs every 4 hours as needed    Taking symbicort 2 puffs in the morning every day, also using as needed for shortness of breath throughout the day, doesn't want to overuse it as it is expensive, states she is short of breath all the time cause she smokes. Planning on quitting soon. Currently on a pack every 3 days, is ready to quit, sick of her clothes smelling and paying for cigarettes.        1/7/2022     1:43 PM 11/21/2023    11:23 AM 1/8/2024    11:35 AM   ACT Total Scores   ACT TOTAL SCORE (Goal Greater than or Equal to 20) 14 17 15   In the past 12 months, how many times did you visit the emergency room for your asthma without being admitted to the hospital? 0 0 0   In the past 12 months, how many times were you hospitalized overnight because of your asthma? 0 0 0       Today's Vitals: /76   Pulse 81   ----------------    I spent 25 minutes with this patient today. All changes were made via collaborative practice agreement with Sudhakar Campbell MD. A copy of the visit  note was provided to the patient's provider(s).    A summary of these recommendations was sent via [employer].    Saman CastroD on 5/6/2024 at 12:55 PM    I spent 100% of the time in direct supervision of the PharmD IV student. I have reviewed the note and agree with the assessment/plan as it is now written.     Arya Tubbs, SamanD  Medication Therapy Management (MTM) Pharmacist  [facility]         Medication Therapy Recommendations  Chronic pain of both hips    Current Medication: Buprenorphine HCl (BELBUCA) 300 MCG FILM buccal film   Rationale: Patient prefers not to take - Adherence - Adherence   Recommendation: Change Medication Formulation    Status: Accepted per Provider          Current Medication: acetaminophen (TYLENOL) 500 MG tablet   Rationale: Does not understand instructions - Adherence - Adherence   Recommendation: Provide Education   Status: Patient Agreed - Adherence/Education         Smoking    Current Medication: nicotine (NICODERM CQ) 14 MG/24HR 24 hr patch   Rationale: Synergistic therapy - Needs additional medication therapy - Indication   Recommendation: Start Medication - nicotine 2 MG lozenge   Status: Accepted per CPA

## 2024-05-06 ENCOUNTER — OFFICE VISIT (OUTPATIENT)
Dept: PHARMACY | Facility: OTHER | Age: 39
End: 2024-05-06
Payer: COMMERCIAL

## 2024-05-06 ENCOUNTER — TELEPHONE (OUTPATIENT)
Dept: PALLIATIVE MEDICINE | Facility: OTHER | Age: 39
End: 2024-05-06
Payer: COMMERCIAL

## 2024-05-06 VITALS — DIASTOLIC BLOOD PRESSURE: 76 MMHG | HEART RATE: 81 BPM | SYSTOLIC BLOOD PRESSURE: 126 MMHG

## 2024-05-06 DIAGNOSIS — M25.551 CHRONIC PAIN OF BOTH HIPS: Primary | ICD-10-CM

## 2024-05-06 DIAGNOSIS — J45.41 MODERATE PERSISTENT ASTHMA WITH EXACERBATION: ICD-10-CM

## 2024-05-06 DIAGNOSIS — E55.9 VITAMIN D DEFICIENCY: ICD-10-CM

## 2024-05-06 DIAGNOSIS — M25.552 CHRONIC PAIN OF BOTH HIPS: Primary | ICD-10-CM

## 2024-05-06 DIAGNOSIS — G89.29 CHRONIC PAIN OF BOTH HIPS: Primary | ICD-10-CM

## 2024-05-06 DIAGNOSIS — F17.200 SMOKING: ICD-10-CM

## 2024-05-06 PROCEDURE — 99607 MTMS BY PHARM ADDL 15 MIN: CPT | Performed by: PHARMACIST

## 2024-05-06 PROCEDURE — 99606 MTMS BY PHARM EST 15 MIN: CPT | Performed by: PHARMACIST

## 2024-05-06 RX ORDER — BUPRENORPHINE 10 UG/H
1 PATCH TRANSDERMAL
Qty: 4 PATCH | Refills: 1 | Status: SHIPPED | OUTPATIENT
Start: 2024-05-06 | End: 2024-06-19

## 2024-05-06 RX ORDER — POLYETHYLENE GLYCOL 3350 17 G
POWDER IN PACKET (EA) ORAL
Qty: 100 LOZENGE | Refills: 2 | Status: SHIPPED | OUTPATIENT
Start: 2024-05-06

## 2024-05-06 NOTE — TELEPHONE ENCOUNTER
HEATHER PA REQUEST    Please route outcome to MTM/RP: Arya    Prior Authorization Retail Medication Request    Medication/Dose: Butrans 10 mcg   ICD code (if different than what is on RX):  NA  Previously Tried and Failed:  Belbuca, Suboxone. Does not tolerate oral formulations.       Insurance Name:  [facility]  Insurance ID:  [id number]

## 2024-05-06 NOTE — PROGRESS NOTES
Patient brought Suboxone to the clinic to be destroyed. Films destroyed in activated charcoal with RN present.

## 2024-05-06 NOTE — PATIENT INSTRUCTIONS
"Recommendations from today's MTM visit:                                                       Happy early birthday!     Restart belbuca 300 mcg twice daily, will put in prior authorization for Butrans patch.  Limit tylenol 650 mg to max of 2 tablets at time up to four times daily (max of 4000 mg per day).    Start Nicotine 2 mg lozenges as needed for cigarette cravings.     Follow-up: Return in about 4 weeks (around 6/3/2024) for Medication Management Pharmacist, in person.    It was great speaking with you today.  I value your experience and would be very thankful for your time in providing feedback in our clinic survey. In the next few days, you may receive an email or text message from [employer] with a link to a survey related to your  clinical pharmacist.\"     To schedule another MTM appointment, please call the clinic directly or you may call the MTM scheduling line at [phone].    My Clinical Pharmacist's contact information:                                                      Please feel free to contact me with any questions or concerns you have.      Arya Tubbs, PharmD  Medication Therapy Management (MTM) Pharmacist  [facility]      "

## 2024-05-06 NOTE — TELEPHONE ENCOUNTER
Changing from Belbuca to Butrans per 05/06/24 MTM visit.   (Patient is going to continue Belbuca until PA gets approved for Butrans).

## 2024-05-09 NOTE — TELEPHONE ENCOUNTER
PA Initiation    Medication: BUPRENORPHINE 10 MCG/HR TD PTWK  Insurance Company: [employer] - Phone [phone] Fax [phone]  [facility] [address] [place] [facility] [address] [place] [address]  Filling Pharmacy Phone: [phone]  Filling Pharmacy Fax: [phone]  Start Date: 5/9/2024

## 2024-05-10 ENCOUNTER — OFFICE VISIT (OUTPATIENT)
Dept: FAMILY MEDICINE | Facility: CLINIC | Age: 39
End: 2024-05-10
Payer: COMMERCIAL

## 2024-05-10 VITALS
HEIGHT: 64 IN | TEMPERATURE: 98.5 F | HEART RATE: 73 BPM | OXYGEN SATURATION: 97 % | DIASTOLIC BLOOD PRESSURE: 73 MMHG | SYSTOLIC BLOOD PRESSURE: 110 MMHG | BODY MASS INDEX: 32.44 KG/M2 | RESPIRATION RATE: 24 BRPM | WEIGHT: 190 LBS

## 2024-05-10 DIAGNOSIS — S02.5XXA CLOSED FRACTURE OF TOOTH, INITIAL ENCOUNTER: Primary | ICD-10-CM

## 2024-05-10 PROCEDURE — 99213 OFFICE O/P EST LOW 20 MIN: CPT | Performed by: FAMILY MEDICINE

## 2024-05-10 PROCEDURE — G2211 COMPLEX E/M VISIT ADD ON: HCPCS | Performed by: FAMILY MEDICINE

## 2024-05-10 RX ORDER — AMOXICILLIN 500 MG/1
500 CAPSULE ORAL 2 TIMES DAILY
Qty: 4 CAPSULE | Refills: 0 | Status: SHIPPED | OUTPATIENT
Start: 2024-05-10 | End: 2024-05-12

## 2024-05-10 ASSESSMENT — ASTHMA QUESTIONNAIRES
QUESTION_3 LAST FOUR WEEKS HOW OFTEN DID YOUR ASTHMA SYMPTOMS (WHEEZING, COUGHING, SHORTNESS OF BREATH, CHEST TIGHTNESS OR PAIN) WAKE YOU UP AT NIGHT OR EARLIER THAN USUAL IN THE MORNING: ONCE OR TWICE
QUESTION_5 LAST FOUR WEEKS HOW WOULD YOU RATE YOUR ASTHMA CONTROL: SOMEWHAT CONTROLLED
QUESTION_4 LAST FOUR WEEKS HOW OFTEN HAVE YOU USED YOUR RESCUE INHALER OR NEBULIZER MEDICATION (SUCH AS ALBUTEROL): ONE OR TWO TIMES PER DAY
ACT_TOTALSCORE: 17
QUESTION_2 LAST FOUR WEEKS HOW OFTEN HAVE YOU HAD SHORTNESS OF BREATH: ONCE OR TWICE A WEEK
QUESTION_1 LAST FOUR WEEKS HOW MUCH OF THE TIME DID YOUR ASTHMA KEEP YOU FROM GETTING AS MUCH DONE AT WORK, SCHOOL OR AT HOME: A LITTLE OF THE TIME
ACT_TOTALSCORE: 17

## 2024-05-10 ASSESSMENT — PAIN SCALES - GENERAL: PAINLEVEL: NO PAIN (0)

## 2024-05-10 NOTE — PROGRESS NOTES
"  Assessment & Plan     (S02.5XXA) Closed fracture of tooth, initial encounter  (primary encounter diagnosis)  Comment: Exam findings were discussed  Prophylactic antibiotic generally not indicated  However since going out of town with uncertainties will prophylax with amoxicillin 500 mg twice daily until be seen by her dentist on Monday (3 days from now) .   Plan: amoxicillin (AMOXIL) 500 MG capsule            The longitudinal plan of care for the diagnosis(es)/condition(s) as documented were addressed during this visit. Due to the added complexity in care, I will continue to support Judy in the subsequent management and with ongoing continuity of care.         BMI  Estimated body mass index is 32.61 kg/m  as calculated from the following:    Height as of this encounter: 1.626 m (5' 4\").    Weight as of this encounter: 86.2 kg (190 lb).             Leana Kim is a 38 year old, presenting to discuss provide antibiotic for broken tooth, happened a couple weeks ago.  She is going out of town and concerned with getting tooth infection prior to seeing her dentist on Monday (3 days from now) asking for prophylactic antibiotic.  She has no tooth pain currently.         5/10/2024     9:48 AM   Additional Questions   Roomed by CARTER Ferguson   Accompanied by NGOC     History of Present Illness       Reason for visit:  Broken tooth abd i thonk nerves are exposed    She eats 2-3 servings of fruits and vegetables daily.She consumes 1 sweetened beverage(s) daily.She exercises with enough effort to increase her heart rate 9 or less minutes per day.  She exercises with enough effort to increase her heart rate 3 or less days per week. She is missing 2 dose(s) of medications per week.                     Objective    /73 (BP Location: Right arm, Patient Position: Sitting, Cuff Size: Adult Large)   Pulse 73   Temp 98.5  F (36.9  C) (Oral)   Resp 24   Ht 1.626 m (5' 4\")   Wt 86.2 kg (190 lb)   LMP 04/18/2024 " (Exact Date)   SpO2 97%   BMI 32.61 kg/m    Body mass index is 32.61 kg/m .    Physical Exam   Right upper back molar tooth fracture, no significant gum swelling or gingivitis            Signed Electronically by: Jason Alanis MD

## 2024-05-10 NOTE — TELEPHONE ENCOUNTER
PRIOR AUTHORIZATION DENIED    Medication: BUPRENORPHINE 10 MCG/HR TD PTWK    Insurance Company: [employer] - Phone [phone] Fax [phone]    Denial Date: 5/10/2024    Denial Reason(s): Patient needs to try and fail 2 preferred medication:        Appeal Information:

## 2024-05-23 ENCOUNTER — TRANSFERRED RECORDS (OUTPATIENT)
Dept: HEALTH INFORMATION MANAGEMENT | Facility: CLINIC | Age: 39
End: 2024-05-23
Payer: COMMERCIAL

## 2024-05-30 NOTE — TELEPHONE ENCOUNTER
Rec'd called from Randa at [facility] appeals teams- appeal has been approved and determination letter has been faxed

## 2024-05-30 NOTE — TELEPHONE ENCOUNTER
Medication Appeal Initiation    Medication: BUPRENORPHINE 10 MCG/HR TD PTWK  Appeal Start Date:  5/29/2024  Insurance Company: [employer] - Phone [phone] Fax [phone]  Insurance Phone: [phone]  Insurance Fax: [phone]  Comments:

## 2024-05-30 NOTE — TELEPHONE ENCOUNTER
MEDICATION APPEAL APPROVED    Medication: BUPRENORPHINE 10 MCG/HR TD PTWK  Authorization Effective Date: 5/30/2024  Authorization Expiration Date: 5/30/2025  Approved Dose/Quantity:   Reference #:     Appeal Insurance Company: [employer] - Phone [phone] Fax [phone]   Expected CoPay: $       CoPay Card Available:    Financial Assistance Needed:   Filling Pharmacy: [facility], [place] [address] [place] [address] - [phone] [place] [address]  Patient Notified: **Instructed pharmacy to notify patient when script is ready to /ship.**  Comments:

## 2024-05-31 NOTE — PROGRESS NOTES
Medication Therapy Management (MTM) Encounter    ASSESSMENT:                            Medication Adherence/Access: No issues identified    Chronic Pain:   Stopped Belbuca due to palatability. Unable to get Butrans as pharmacy is out of stock. She will follow-up with pharmacy and get started with patches. Can continue to titrate over the next few months. Reviewed it would be okay to use short-acting pain medicines from the oral surgeon. Given sleep concerns due to pain, will use short course of Tizanidine for pain/sleep as Butrans gets to steady state.     Low Vitamin D:   Taking Vitamin D supplement as prescribed, due for repeat levels.      Asthma:  Last ACT not at goal > 19. Smoking may be contributing, but not using Symbicort twice daily scheduled. Reviewed use.     PLAN:                            Start Butrans 10 mcg/hr patch. Place 1 patch every 7 days.   Start Tizanidine 4 mg at bedtime for pain/sleep.   Use Symbicort 2 puffs twice daily every day, no matter how your breathing is.     Follow-up: Return in about 17 days (around 6/20/2024) for Medication Management Pharmacist, in person.  9/12 with Sudhakar Campbell MD     SUBJECTIVE/OBJECTIVE:                          Judy Peterson is a 39 year old female coming in for a follow-up visit from 3/11/24.      Reason for visit: belbuca follow up.    Allergies/ADRs: Reviewed in chart  Past Medical History: Reviewed in chart  Tobacco: She reports that she has been smoking cigarettes. She has been exposed to tobacco smoke. She has never used smokeless tobacco.Nicotine/Tobacco Cessation Plan  Pharmacotherapies : Nicotine patch - not currently using, planning on starting soon. Planning to quit after oral surgery.   Alcohol: reports no history of alcohol use     Medication Adherence/Access: no issues reported     Chronic Pain:   Diclofenac 1% topical gel 2 g 4 times daily  Gabapentin 300 mg three times daily   Acetaminophen 650 mg tab - 2 tablets twice a day, more if  she's in pain, takes 4 at a time   Meloxicam 15 mg daily.     At last visit, restarted Belbuca 300 mcg twice daily with plan to transition to Butrans 10 mcg patch. Required an appeal so did not get approved until 5/30/24. Waiting on pharmacy to fill the patches.     Hasn't been using the films because they're not palatable.     Hips are on fire - burning pain. Feels really stiff and store.     Using OTC Ibuprofen 3 tabs at a time at least twice daily. Not using Meloxicam.   Can't sleep, sit, stand. Can't get comfortable enough to fall asleep.     Has a cyst in between her teach and a tooth being pulled. Will be having surgery on the 23rd.            Low Vitamin D:   Vitamin D2 50,000 units once weekly.      Vitamin D Deficiency Screening Results:  Lab Results   Component Value Date    VITDT 16 (L) 02/09/2024      Asthma:  Montelukast 10 mg daily  Symbicort 160-4.5 mcg 2 puffs twice daily + 1-2 puffs as needed. Max 12 puffs per day.   Albuterol HFA 90 mcg 2 puffs every 4 hours as needed    Having daily shortness of breath.   Wheezes more at night.           11/21/2023    11:23 AM 1/8/2024    11:35 AM 5/10/2024     9:45 AM   ACT Total Scores   ACT TOTAL SCORE (Goal Greater than or Equal to 20) 17 15 17   In the past 12 months, how many times did you visit the emergency room for your asthma without being admitted to the hospital? 0 0 0   In the past 12 months, how many times were you hospitalized overnight because of your asthma? 0 0 0       Today's Vitals: LMP 04/18/2024 (Exact Date)   ----------------    I spent 25 minutes with this patient today. All changes were made via collaborative practice agreement with Sudhakar Campbell MD. A copy of the visit note was provided to the patient's provider(s).    A summary of these recommendations was sent via [employer].    Arya Tubbs, SamanD  Medication Therapy Management (MTM) Pharmacist  [facility]         Medication Therapy Recommendations  Chronic pain  of both hips    Current Medication: buprenorphine (BUTRANS) 10 MCG/HR WK patch   Rationale: Synergistic therapy - Needs additional medication therapy - Indication   Recommendation: Start Medication - tiZANidine 4 MG tablet   Status: Accepted per CPA         Mild persistent asthma without complication    Current Medication: budesonide-formoterol (SYMBICORT) 160-4.5 MCG/ACT Inhaler   Rationale: Does not understand instructions - Adherence - Adherence   Recommendation: Provide Education   Status: Accepted per CPA

## 2024-06-03 ENCOUNTER — OFFICE VISIT (OUTPATIENT)
Dept: PHARMACY | Facility: OTHER | Age: 39
End: 2024-06-03
Payer: COMMERCIAL

## 2024-06-03 DIAGNOSIS — M25.552 CHRONIC PAIN OF BOTH HIPS: Primary | ICD-10-CM

## 2024-06-03 DIAGNOSIS — E55.9 VITAMIN D DEFICIENCY: ICD-10-CM

## 2024-06-03 DIAGNOSIS — J45.41 MODERATE PERSISTENT ASTHMA WITH EXACERBATION: ICD-10-CM

## 2024-06-03 DIAGNOSIS — M25.551 CHRONIC PAIN OF BOTH HIPS: Primary | ICD-10-CM

## 2024-06-03 DIAGNOSIS — G89.29 CHRONIC PAIN OF BOTH HIPS: Primary | ICD-10-CM

## 2024-06-03 PROCEDURE — 99607 MTMS BY PHARM ADDL 15 MIN: CPT | Performed by: PHARMACIST

## 2024-06-03 PROCEDURE — 99606 MTMS BY PHARM EST 15 MIN: CPT | Performed by: PHARMACIST

## 2024-06-03 RX ORDER — ERGOCALCIFEROL 1.25 MG/1
50000 CAPSULE, LIQUID FILLED ORAL WEEKLY
Qty: 12 CAPSULE | Refills: 0 | Status: SHIPPED | OUTPATIENT
Start: 2024-06-03

## 2024-06-03 NOTE — PATIENT INSTRUCTIONS
"Recommendations from today's MTM visit:                                                         Start Butrans 10 mcg/hr patch. Place 1 patch every 7 days.   Start Tizanidine 4 mg at bedtime for pain/sleep.   Use Symbicort 2 puffs twice daily every day, no matter how your breathing is.     Follow-up: Return in about 17 days (around 6/20/2024) for Medication Management Pharmacist, in person.    It was great speaking with you today.  I value your experience and would be very thankful for your time in providing feedback in our clinic survey. In the next few days, you may receive an email or text message from [employer] with a link to a survey related to your  clinical pharmacist.\"     To schedule another MTM appointment, please call the clinic directly or you may call the MTM scheduling line at [phone].    My Clinical Pharmacist's contact information:                                                      Please feel free to contact me with any questions or concerns you have.      Arya Tubbs, PharmD  Medication Therapy Management (MTM) Pharmacist  [facility]      "

## 2024-06-05 ENCOUNTER — TRANSFERRED RECORDS (OUTPATIENT)
Dept: HEALTH INFORMATION MANAGEMENT | Facility: CLINIC | Age: 39
End: 2024-06-05
Payer: COMMERCIAL

## 2024-06-16 ENCOUNTER — MYC MEDICAL ADVICE (OUTPATIENT)
Dept: PALLIATIVE MEDICINE | Facility: OTHER | Age: 39
End: 2024-06-16
Payer: COMMERCIAL

## 2024-06-19 ENCOUNTER — OFFICE VISIT (OUTPATIENT)
Dept: PALLIATIVE MEDICINE | Facility: OTHER | Age: 39
End: 2024-06-19
Attending: ANESTHESIOLOGY
Payer: COMMERCIAL

## 2024-06-19 VITALS
DIASTOLIC BLOOD PRESSURE: 76 MMHG | SYSTOLIC BLOOD PRESSURE: 136 MMHG | OXYGEN SATURATION: 97 % | HEART RATE: 64 BPM | BODY MASS INDEX: 31.76 KG/M2 | WEIGHT: 185 LBS

## 2024-06-19 DIAGNOSIS — M25.551 CHRONIC PAIN OF BOTH HIPS: Primary | ICD-10-CM

## 2024-06-19 DIAGNOSIS — M25.552 CHRONIC PAIN OF BOTH HIPS: Primary | ICD-10-CM

## 2024-06-19 DIAGNOSIS — G89.29 CHRONIC PAIN OF BOTH HIPS: Primary | ICD-10-CM

## 2024-06-19 PROCEDURE — 99213 OFFICE O/P EST LOW 20 MIN: CPT | Performed by: ANESTHESIOLOGY

## 2024-06-19 PROCEDURE — G0463 HOSPITAL OUTPT CLINIC VISIT: HCPCS | Performed by: ANESTHESIOLOGY

## 2024-06-19 RX ORDER — BUPRENORPHINE 15 UG/H
1 PATCH TRANSDERMAL
Qty: 4 PATCH | Refills: 1 | Status: SHIPPED | OUTPATIENT
Start: 2024-06-19 | End: 2024-07-11

## 2024-06-19 ASSESSMENT — PAIN SCALES - GENERAL: PAINLEVEL: SEVERE PAIN (7)

## 2024-06-19 NOTE — PROGRESS NOTES
[facility] Follow-up    Date of visit: 6/19/2024    Chief complaint:   Chief Complaint   Patient presents with    Pain     Follow-up for arthritis of the hips.  Interval history:    Reviewing the record he did not tolerate Suboxone describes significant vomiting.  Working with our MTM pharmacist on other preparations of buprenorphine.    She is being treated for dental infections.    She reviews today had an updated MRI showing progression of her arthritis.    She has a so security hearing in July.  Working with surgery they would like to schedule after that hearing and determination.    She continues with hip pain.  She has had to decrease her shifts from 5 to 4-hour shifts working 3 to 4-week.  She finds walking on hard floors has been very difficult.    She reviews with the Suboxone having 10 hours of severe vomiting.  Has gone back to the Butrans patch using a 10 mcg patch weekly.  Tolerating that.  Using gabapentin in the morning and meloxicam once a day may have some benefit for managing pain.  No side effects.  There is still some room to go.    Last urine drug test in February.   reviewed    She is still smoking 1 pack/day.  Is aware she would need to quit before then and has done before.  Has patches and gum available.    When last seen we discussed frequency specific microcurrent and red light therapy.  She is interested in this but finances are significant issue anything not covered by insurance is not available.  Declines acupuncture.            Medications:  Current Outpatient Medications   Medication Sig Dispense Refill    acetaminophen (TYLENOL) 500 MG tablet Take 500-1,000 mg by mouth as needed for mild pain      albuterol (PROAIR HFA/PROVENTIL HFA/VENTOLIN HFA) 108 (90 Base) MCG/ACT inhaler Inhale 2 puffs into the lungs every 4 hours as needed for shortness of breath or wheezing 18 g 1    budesonide-formoterol (SYMBICORT) 160-4.5 MCG/ACT  Inhaler Inhale 2 puffs twice daily plus 1-2 puffs as needed. May use up to 12 puffs per day. 20.4 g 11    buprenorphine (BUTRANS) 15 MCG/HR Wk patch Place 1 patch onto the skin every 7 days 4 patch 1    gabapentin (NEURONTIN) 300 MG capsule One capsule twice a day 5 days, may increase to three times a day 90 capsule 1    meloxicam (MOBIC) 7.5 MG tablet       montelukast (SINGULAIR) 10 MG tablet Take 1 tablet (10 mg) by mouth at bedtime 90 tablet 3    nicotine (COMMIT) 2 MG lozenge Dissolve 1 lozenge orally every 1 hours as directed. 100 lozenge 2    nicotine (NICODERM CQ) 14 MG/24HR 24 hr patch APPLY ONE PATCH TO THE SKIN EVERY DAY (EVERY 24HRS) FOR 14 DAYS 30 patch 1    ondansetron (ZOFRAN ODT) 4 MG ODT tab Take 1 tablet (4 mg) by mouth every 6 hours as needed for nausea Ok to take 2 tabs for 1st dose. 15 tablet 0    tiZANidine (ZANAFLEX) 4 MG tablet Take 1 tablet (4 mg) by mouth at bedtime 30 tablet 0    valACYclovir (VALTREX) 1000 mg tablet TAKE TWO TABLETS BY MOUTH TWICE A DAY 4 tablet 11    vitamin D2 (ERGOCALCIFEROL) 88396 units (1250 mcg) capsule Take 1 capsule (50,000 Units) by mouth once a week 12 capsule 0           Physical Exam:  Blood pressure 136/76, pulse 64, weight 83.9 kg (185 lb), last menstrual period 06/19/2024, SpO2 97%, not currently breastfeeding.      Alert, clear sensorium.  No respiratory distress, no pain behavior.    Ambulates with cane.        :  Assessment:   Osteoarthritis of the hips, anticipating hip surgery.    We are going to help patient be comfortable for her surgery.    Discussed the plan as above.    Total time 26 minutes.     minutes spent on the date of encounter doing chart review, history, and exam documentation and further activities as noted above.     Sudhakar Campbell MD  [facility]

## 2024-06-19 NOTE — PATIENT INSTRUCTIONS
[facility] Number:  [phone]  Call with any questions about your care and for scheduling assistance.   Calls are returned Monday through Friday between 8 AM and 4:30 PM. We usually get back to you within 2 business days depending on the issue/request.    If we are prescribing your medications:  For opioid medication refills, call the clinic or send a [employer] message 7 days in advance.  Please include:  Name of requested medication  Name of the pharmacy.  For non-opioid medications, call your pharmacy directly to request a refill. Please allow 3-4 days to be processed.   Per [place] State Law:  All controlled substance prescriptions must be filled within 30 days of being written.    For those controlled substances allowing refills, pickup must occur within 30 days of last fill.      We believe regular attendance is key to your success in our program!    Any time you are unable to keep your appointment we ask that you call us at least 24 hours in advance to cancel.This will allow us to offer the appointment time to another patient.   Multiple missed appointments may lead to dismissal from the clinic.     PLAN:  You are working with orthopedics regarding timing of your hip surgery.    You will have a vitamin D level checked before your surgery.    Increase the Butrans patch to 15 mcg weekly.  Update Dr. Campbell's office after 2 and half to 3 weeks with response to this dose and whether to increase to 20 mcg.    Continue the gabapentin and meloxicam as you are.    You are working on decreasing your smoking.    Follow-up with Dr. Campbell for a return visit in 3 months

## 2024-06-19 NOTE — PROGRESS NOTES
Patient presents to the clinic today for a visit with NATALY DUNCAN MD regarding Pain Management.          4/1/2024     1:25 PM 6/19/2024     2:43 PM   PEG Score   PEG Total Score 9.33 8.67       UDS/CSA- 02.09.2024    Medications- Buprenorphine day 1, left arm    Notes    Radha Schneider  [facility] Clinical Assistant

## 2024-07-02 ENCOUNTER — VIRTUAL VISIT (OUTPATIENT)
Dept: FAMILY MEDICINE | Facility: CLINIC | Age: 39
End: 2024-07-02
Payer: COMMERCIAL

## 2024-07-02 DIAGNOSIS — Z71.6 ENCOUNTER FOR SMOKING CESSATION COUNSELING: ICD-10-CM

## 2024-07-02 DIAGNOSIS — B00.9 HERPES SIMPLEX TYPE 1 INFECTION: ICD-10-CM

## 2024-07-02 DIAGNOSIS — F90.2 ATTENTION DEFICIT HYPERACTIVITY DISORDER (ADHD), COMBINED TYPE: Primary | ICD-10-CM

## 2024-07-02 PROCEDURE — 99406 BEHAV CHNG SMOKING 3-10 MIN: CPT | Mod: 95 | Performed by: NURSE PRACTITIONER

## 2024-07-02 PROCEDURE — 99214 OFFICE O/P EST MOD 30 MIN: CPT | Mod: 95 | Performed by: NURSE PRACTITIONER

## 2024-07-02 RX ORDER — VALACYCLOVIR HYDROCHLORIDE 1 G/1
TABLET, FILM COATED ORAL
Qty: 4 TABLET | Refills: 11 | Status: SHIPPED | OUTPATIENT
Start: 2024-07-02

## 2024-07-02 RX ORDER — BUPROPION HYDROCHLORIDE 150 MG/1
150 TABLET ORAL EVERY MORNING
Qty: 30 TABLET | Refills: 0 | Status: SHIPPED | OUTPATIENT
Start: 2024-07-02

## 2024-07-02 RX ORDER — AMOXICILLIN 500 MG/1
500 CAPSULE ORAL 2 TIMES DAILY
COMMUNITY
Start: 2024-06-24 | End: 2024-07-15

## 2024-07-02 ASSESSMENT — ASTHMA QUESTIONNAIRES
QUESTION_1 LAST FOUR WEEKS HOW MUCH OF THE TIME DID YOUR ASTHMA KEEP YOU FROM GETTING AS MUCH DONE AT WORK, SCHOOL OR AT HOME: SOME OF THE TIME
QUESTION_4 LAST FOUR WEEKS HOW OFTEN HAVE YOU USED YOUR RESCUE INHALER OR NEBULIZER MEDICATION (SUCH AS ALBUTEROL): NOT AT ALL
QUESTION_3 LAST FOUR WEEKS HOW OFTEN DID YOUR ASTHMA SYMPTOMS (WHEEZING, COUGHING, SHORTNESS OF BREATH, CHEST TIGHTNESS OR PAIN) WAKE YOU UP AT NIGHT OR EARLIER THAN USUAL IN THE MORNING: NOT AT ALL
ACT_TOTALSCORE: 18
QUESTION_2 LAST FOUR WEEKS HOW OFTEN HAVE YOU HAD SHORTNESS OF BREATH: ONCE A DAY
ACT_TOTALSCORE: 18
QUESTION_5 LAST FOUR WEEKS HOW WOULD YOU RATE YOUR ASTHMA CONTROL: SOMEWHAT CONTROLLED

## 2024-07-02 NOTE — PROGRESS NOTES
"Judy is a 39 year old who is being evaluated via a billable video visit.    How would you like to obtain your AVS? MyChart  If the video visit is dropped, the invitation should be resent by: Text to cell phone: [phone]  Will anyone else be joining your video visit? No      Assessment & Plan     Attention deficit hyperactivity disorder (ADHD), combined type  Discussed treatment options.  Will start bupropion  mg daily.  Educated on its indications and side effects.  I encouraged follow-up in 1 month.    - buPROPion (WELLBUTRIN XL) 150 MG 24 hr tablet  Dispense: 30 tablet; Refill: 0    Herpes simplex type 1 infection  She continues valacyclovir as needed.  - valACYclovir (VALTREX) 1000 mg tablet  Dispense: 4 tablet; Refill: 11    Encounter for smoking cessation counseling  I spent 3 minutes with the patient discussing smoking cessation options.  She is ready to quit smoking.  Provided prescription for bupropion  mg daily.  She is to follow-up in 1 month.  - buPROPion (WELLBUTRIN XL) 150 MG 24 hr tablet  Dispense: 30 tablet; Refill: 0  - SMOKING CESSATION COUNSELING, 3-10 MIN            BMI  Estimated body mass index is 31.76 kg/m  as calculated from the following:    Height as of 5/10/24: 1.626 m (5' 4\").    Weight as of 6/19/24: 83.9 kg (185 lb).             Leana Kim is a 39 year old, presenting for the following health issues:  Patient presents today for to initiate medication for ADHD.  Patient has a history of this in childhood.  She was evaluated by [facility] on 5/23/2024 where she received a diagnosis of ADHD, combined type.  She has tried Adderall and Ritalin in the past.  She is not interested in a controlled substance today.  She has had difficulty concentrating completing tasks in a timely manner.  She has had some emotional outbursts.  She is currently working at [employer] 16 hours/week.  Patient is smoking 1/2 pack/day.  Patient is ready to quit " smoking.  She requests refill of valacyclovir today.  She obtains 2-3 cold sores per year.      Follow Up, Medication Follow-up, and A.D.H.D      7/2/2024    10:45 AM   Additional Questions   Roomed by Anne Rose Start Time: 1:29 PM    A.D.H.D    History of Present Illness       Reason for visit:  Adhd    She eats 2-3 servings of fruits and vegetables daily.She consumes 1 sweetened beverage(s) daily.She exercises with enough effort to increase her heart rate 9 or less minutes per day.  She exercises with enough effort to increase her heart rate 3 or less days per week. She is missing 3 dose(s) of medications per week.  She is not taking prescribed medications regularly due to remembering to take.           Review of Systems  Constitutional, HEENT, cardiovascular, pulmonary, GI, , musculoskeletal, neuro, skin, endocrine and psych systems are negative, except as otherwise noted.      Objective           Vitals:  No vitals were obtained today due to virtual visit.    Physical Exam   GENERAL: alert and no distress  EYES: Eyes grossly normal to inspection.  No discharge or erythema, or obvious scleral/conjunctival abnormalities.  RESP: No audible wheeze, cough, or visible cyanosis.    SKIN: Visible skin clear. No significant rash, abnormal pigmentation or lesions.  NEURO: Cranial nerves grossly intact.  Mentation and speech appropriate for age.  PSYCH: Appropriate affect, tone, and pace of words        Video-Visit Details    Type of service:  Video Visit   Video End Time:1:36 PM  Originating Location (pt. Location): Home    Distant Location (provider location):  On-site  Platform used for Video Visit: [place]  Signed Electronically by: CHRIS Saavedra CNP

## 2024-07-09 ENCOUNTER — MYC REFILL (OUTPATIENT)
Dept: PHARMACY | Facility: OTHER | Age: 39
End: 2024-07-09
Payer: COMMERCIAL

## 2024-07-09 DIAGNOSIS — M25.552 CHRONIC PAIN OF BOTH HIPS: ICD-10-CM

## 2024-07-09 DIAGNOSIS — G89.29 CHRONIC PAIN OF BOTH HIPS: ICD-10-CM

## 2024-07-09 DIAGNOSIS — M25.551 CHRONIC PAIN OF BOTH HIPS: ICD-10-CM

## 2024-07-12 NOTE — PROGRESS NOTES
Medication Therapy Management (MTM) Encounter    ASSESSMENT:                            Medication Adherence/Access: No issues identified    Chronic Pain:   Minimal benefit so far from patch titration, but with patch not adhering, likely isn't getting to steady concentrations. Declines Belbuca. Hesitant to consider Suboxone again. Reviewed that nausea at that time might have been from going from Belbuca to Suboxone. May be worth a retrial in the future if patient  tolerates Butrans 20 mcg patch. Is currently having some nausea, but as it improves within 30 minutes each morning, likely not patch related. Will trial a gabapentin hold. Will treat constipation with Senna-S.     ADHD: Just started Bupropion. Too soon to notice effects.     Low Vitamin D:   Taking Vitamin D supplement as prescribed, due for repeat levels.      Asthma:  Last ACT not at goal > 19. Continues to smoke. Not ready to quit.     PLAN:                            Senna-Docusate 8.6-50 mg 2 tabs at bedtime. After 3 days, if no bowel movements you can increase to 2 tabs twice daily.   Use Tegaderm patches to adhere your Butrans patch. You can place Butrans  on upper outer arm, upper chest, upper back, or side of the chest.   Try holding gabapentin for 1 week to see if nausea improves.       Follow-up: Return in about 4 weeks (around 8/12/2024) for Medication Management Pharmacist, in person.  9/12 with Sudhakar Campbell MD     SUBJECTIVE/OBJECTIVE:                          Judy Peterson is a 39 year old female coming in for a follow-up visit from 06/03/24.      Reason for visit: belbuca follow up.    Allergies/ADRs: Reviewed in chart  Past Medical History: Reviewed in chart  Tobacco: She reports that she has been smoking cigarettes. She has been exposed to tobacco smoke. She has never used smokeless tobacco.Nicotine/Tobacco Cessation Plan  Pharmacotherapies : bupropion (Zyban) and Nicotine patch - Quit for 48 hours around her surgery. But life is  stressful now, so not ready to quit.   Alcohol: reports no history of alcohol use     Medication Adherence/Access: no issues reported     Chronic Pain:   Diclofenac 1% topical gel 2 g 4 times daily  Gabapentin 300 mg three times daily   Acetaminophen 650 mg tab - 2 tablets twice a day, more if she's in pain, takes 4 at a time   Meloxicam 15 mg daily.   Butrans 15 mcg patch - increased at last visit with Dr. Campbell. Further increased to 20 mcg     Only using 1 cap gabapentin daily. [place] horror stories about this.   Patches don't stay on very well. Has to wrap to keep the patch on. Getting a rash from it. Has a visible raised, red area from where she had the patch placed.     Doesn't currently have a patch on. It fell off. Took it off last night. Will go home and put a new patch on.     Hips are on fire - burning pain. Feels really stiff and store.   Difficulty getting in and out of the car, change clothes, get in and out of the tub.     Started to have nausea with the 15 mcg patch. Every morning. Lasts for 20-30 minutes. Also having constipation. Having a bowel movement every 3-4 days.     Has disability hearing on 7/23.   Had visit with Ortho on 6/13 - delaying surgery, but notes do not indicate why. Patient states they're trying to delay due to age and she would likely need a repeat surgery.         Low Vitamin D:   Vitamin D2 50,000 units once weekly.      Vitamin D Deficiency Screening Results:  Lab Results   Component Value Date    VITDT 16 (L) 02/09/2024      ADHD:   Bupropion 150 mg ER daily - started at last PCP visit.     Just started a few days ago.       Asthma:  Montelukast 10 mg daily  Symbicort 160-4.5 mcg 2 puffs twice daily + 1-2 puffs as needed. Max 12 puffs per day.   Albuterol HFA 90 mcg 2 puffs every 4 hours as needed      Wheezes more at night.           1/8/2024    11:35 AM 5/10/2024     9:45 AM 7/2/2024    10:42 AM   ACT Total Scores   ACT TOTAL SCORE (Goal Greater than or Equal to 20) 15 17  18   In the past 12 months, how many times did you visit the emergency room for your asthma without being admitted to the hospital? 0 0 0   In the past 12 months, how many times were you hospitalized overnight because of your asthma? 0 0 0       Today's Vitals: /83   Pulse 66   LMP 06/19/2024 (Exact Date)   ----------------    I spent 25 minutes with this patient today. All changes were made via collaborative practice agreement with Sudhakar Campbell MD. A copy of the visit note was provided to the patient's provider(s).    A summary of these recommendations was sent via [employer].    Arya Tubbs, SamanD  Medication Therapy Management (MTM) Pharmacist  [facility]         Medication Therapy Recommendations  Chronic pain of both hips    Current Medication: buprenorphine (BUTRANS) 20 MCG/HR WK patch   Rationale: Cannot swallow/administer medication - Adherence - Adherence   Recommendation: Provide Adherence Intervention   Status: Accepted per CPA          Current Medication: gabapentin (NEURONTIN) 300 MG capsule (Discontinued)   Rationale: Undesirable effect - Adverse medication event - Safety   Recommendation: Discontinue Medication   Status: Accepted per CPA         Drug-induced constipation    Rationale: Untreated condition - Needs additional medication therapy - Indication   Recommendation: Start Medication - Senna S 8.6-50 MG Tabs   Status: Accepted per CPA

## 2024-07-15 ENCOUNTER — OFFICE VISIT (OUTPATIENT)
Dept: PHARMACY | Facility: OTHER | Age: 39
End: 2024-07-15
Payer: COMMERCIAL

## 2024-07-15 VITALS — SYSTOLIC BLOOD PRESSURE: 129 MMHG | HEART RATE: 66 BPM | DIASTOLIC BLOOD PRESSURE: 83 MMHG

## 2024-07-15 DIAGNOSIS — G89.29 CHRONIC PAIN OF BOTH HIPS: Primary | ICD-10-CM

## 2024-07-15 DIAGNOSIS — K59.03 DRUG-INDUCED CONSTIPATION: ICD-10-CM

## 2024-07-15 DIAGNOSIS — M25.552 CHRONIC PAIN OF BOTH HIPS: Primary | ICD-10-CM

## 2024-07-15 DIAGNOSIS — F17.200 SMOKING: ICD-10-CM

## 2024-07-15 DIAGNOSIS — E55.9 VITAMIN D DEFICIENCY: ICD-10-CM

## 2024-07-15 DIAGNOSIS — J45.41 MODERATE PERSISTENT ASTHMA WITH EXACERBATION: ICD-10-CM

## 2024-07-15 DIAGNOSIS — M25.551 CHRONIC PAIN OF BOTH HIPS: Primary | ICD-10-CM

## 2024-07-15 PROCEDURE — 99607 MTMS BY PHARM ADDL 15 MIN: CPT | Performed by: PHARMACIST

## 2024-07-15 PROCEDURE — 99606 MTMS BY PHARM EST 15 MIN: CPT | Performed by: PHARMACIST

## 2024-07-15 RX ORDER — SENNA AND DOCUSATE SODIUM 50; 8.6 MG/1; MG/1
TABLET, FILM COATED ORAL
Qty: 120 TABLET | Refills: 1 | Status: SHIPPED | OUTPATIENT
Start: 2024-07-15 | End: 2024-08-17

## 2024-07-15 NOTE — PATIENT INSTRUCTIONS
"Recommendations from today's MTM visit:                                                       Senna-Docusate 8.6-50 mg 2 tabs at bedtime. After 3 days, if no bowel movements you can increase to 2 tabs twice daily.   Use Tegaderm patches to adhere your Butrans patch. You can place Butrans  on upper outer arm, upper chest, upper back, or side of the chest.   Try holding gabapentin for 1 week to see if nausea improves.        Follow-up: Return in about 4 weeks (around 8/12/2024) for Medication Management Pharmacist, in person.    It was great speaking with you today.  I value your experience and would be very thankful for your time in providing feedback in our clinic survey. In the next few days, you may receive an email or text message from [employer] with a link to a survey related to your  clinical pharmacist.\"     To schedule another MTM appointment, please call the clinic directly or you may call the MTM scheduling line at [phone].    My Clinical Pharmacist's contact information:                                                      Please feel free to contact me with any questions or concerns you have.      Arya Tubbs, PharmD  Medication Therapy Management (MTM) Pharmacist  [facility]      "

## 2024-08-30 DIAGNOSIS — M25.551 CHRONIC PAIN OF BOTH HIPS: ICD-10-CM

## 2024-08-30 DIAGNOSIS — M25.552 CHRONIC PAIN OF BOTH HIPS: ICD-10-CM

## 2024-08-30 DIAGNOSIS — G89.29 CHRONIC PAIN OF BOTH HIPS: ICD-10-CM

## 2024-08-30 NOTE — TELEPHONE ENCOUNTER
Received fax from pharmacy requesting refill(s) for     Tizanidine    Date last filled 07.09.2024    Last Appt Date:06.19.2024    Next Appt scheduled: 09.12.2024    Pharmacy:      [facility], MARVIN BENAVIDEZ [address] [place], [place] [address] - [id number] [place] [address]

## 2024-09-12 ENCOUNTER — OFFICE VISIT (OUTPATIENT)
Dept: PALLIATIVE MEDICINE | Facility: OTHER | Age: 39
End: 2024-09-12
Attending: ANESTHESIOLOGY
Payer: COMMERCIAL

## 2024-09-12 VITALS
WEIGHT: 174 LBS | OXYGEN SATURATION: 99 % | HEART RATE: 80 BPM | DIASTOLIC BLOOD PRESSURE: 91 MMHG | BODY MASS INDEX: 29.87 KG/M2 | SYSTOLIC BLOOD PRESSURE: 128 MMHG

## 2024-09-12 DIAGNOSIS — M25.552 CHRONIC PAIN OF BOTH HIPS: ICD-10-CM

## 2024-09-12 DIAGNOSIS — G89.29 CHRONIC PAIN OF BOTH HIPS: ICD-10-CM

## 2024-09-12 DIAGNOSIS — M25.551 CHRONIC PAIN OF BOTH HIPS: ICD-10-CM

## 2024-09-12 PROCEDURE — G0463 HOSPITAL OUTPT CLINIC VISIT: HCPCS | Performed by: ANESTHESIOLOGY

## 2024-09-12 PROCEDURE — 99213 OFFICE O/P EST LOW 20 MIN: CPT | Performed by: ANESTHESIOLOGY

## 2024-09-12 RX ORDER — FLUTICASONE PROPIONATE 50 MCG
1 SPRAY, SUSPENSION (ML) NASAL WEEKLY
Qty: 16 G | Refills: 2 | Status: SHIPPED | OUTPATIENT
Start: 2024-09-12

## 2024-09-12 RX ORDER — BUPRENORPHINE 20 UG/H
1 PATCH TRANSDERMAL
Qty: 4 PATCH | Refills: 2 | Status: SHIPPED | OUTPATIENT
Start: 2024-09-12

## 2024-09-12 ASSESSMENT — PAIN SCALES - GENERAL: PAINLEVEL: MILD PAIN (2)

## 2024-09-12 NOTE — PROGRESS NOTES
[facility] Follow-up    Date of visit: 9/12/2024    Chief complaint:   Chief Complaint   Patient presents with    Pain    Pain Management     Follow-up for hip osteoarthritis.  Interval history:    Chart reviews working with our MTM pharmacist, adjusting the buprenorphine patch.    She reviews today the hip surgery at present is not yet planned.    She would like to postpone as long as possible given her age.    She is following increasing the Butrans patch to 20 mcg seems to be doing okay.  She does have some itching under the skin with each patch.  Nausea quickly resolved.  Also using meloxicam twice a day and the gabapentin 300 mg twice a day.  This regimen allows her to continue working part-time 12 hours a week.    She has had an appeal for so security aches accepted which is helpful.  She hopes to get more resources including a PCA.    She is taking vitamin D replacement.    She notes she is lost 8 to 10 pounds, attributes to eating a bit differently.  Comfortable with the weight loss.  May possibly due to the Butrans.    Urine drug test in February.   reviewed.    She is taking vitamin D replacement after level followed at 16.            Medications:  Current Outpatient Medications   Medication Sig Dispense Refill    acetaminophen (TYLENOL) 500 MG tablet Take 500-1,000 mg by mouth as needed for mild pain      albuterol (PROAIR HFA/PROVENTIL HFA/VENTOLIN HFA) 108 (90 Base) MCG/ACT inhaler Inhale 2 puffs into the lungs every 4 hours as needed for shortness of breath or wheezing 18 g 1    budesonide-formoterol (SYMBICORT) 160-4.5 MCG/ACT Inhaler Inhale 2 puffs twice daily plus 1-2 puffs as needed. May use up to 12 puffs per day. 20.4 g 11    buprenorphine (BUTRANS) 20 MCG/HR WK patch Place 1 patch onto the skin every 7 days. 4 patch 2    buPROPion (WELLBUTRIN XL) 150 MG 24 hr tablet Take 1 tablet (150 mg) by mouth every morning 30 tablet 0    fluticasone  (FLONASE) 50 MCG/ACT nasal spray Apply 1 spray topically once a week. 16 g 2    meloxicam (MOBIC) 15 MG tablet Take 15 mg by mouth daily      montelukast (SINGULAIR) 10 MG tablet Take 1 tablet (10 mg) by mouth at bedtime 90 tablet 3    nicotine (COMMIT) 2 MG lozenge Dissolve 1 lozenge orally every 1 hours as directed. 100 lozenge 2    nicotine (NICODERM CQ) 14 MG/24HR 24 hr patch APPLY ONE PATCH TO THE SKIN EVERY DAY (EVERY 24HRS) FOR 14 DAYS 30 patch 1    ondansetron (ZOFRAN ODT) 4 MG ODT tab Take 1 tablet (4 mg) by mouth every 6 hours as needed for nausea Ok to take 2 tabs for 1st dose. 15 tablet 0    tiZANidine (ZANAFLEX) 4 MG tablet Take 1 tablet (4 mg) by mouth at bedtime. 30 tablet 0    valACYclovir (VALTREX) 1000 mg tablet TAKE TWO TABLETS BY MOUTH TWICE A DAY 4 tablet 11    vitamin D2 (ERGOCALCIFEROL) 57224 units (1250 mcg) capsule Take 1 capsule (50,000 Units) by mouth once a week 12 capsule 0           Physical Exam:  Blood pressure (!) 128/91, pulse 80, weight 78.9 kg (174 lb), SpO2 99%, not currently breastfeeding.        Alert, clear sensorium, affect fairly full range.  No pain behavior.        Assessment:   Hip osteoarthritis, hip replacement have been discussed.    She feels with her present medication regimen she would like to hold off as long as possible.    Does continue smoking.    Continues working part-time.    Will continue with present medication regimen    Total time 28 minutes       minutes spent on the date of encounter doing chart review, history, and exam documentation and further activities as noted above.     Sudhakar Campbell MD  [facility]

## 2024-09-12 NOTE — PATIENT INSTRUCTIONS
[facility] Number:  [phone]  Call with any questions about your care and for scheduling assistance.   Calls are returned Monday through Friday between 8 AM and 4:30 PM. We usually get back to you within 2 business days depending on the issue/request.    If we are prescribing your medications:  For opioid medication refills, call the clinic or send a [employer] message 7 days in advance.  Please include:  Name of requested medication  Name of the pharmacy.  For non-opioid medications, call your pharmacy directly to request a refill. Please allow 3-4 days to be processed.   Per [place] State Law:  All controlled substance prescriptions must be filled within 30 days of being written.    For those controlled substances allowing refills, pickup must occur within 30 days of last fill.      We believe regular attendance is key to your success in our program!    Any time you are unable to keep your appointment we ask that you call us at least 24 hours in advance to cancel.This will allow us to offer the appointment time to another patient.   Multiple missed appointments may lead to dismissal from the clinic.     PLAN:    Continue the Butrans patch 20 mcg weekly    Apply Flonase nasal spray to the area of the skin where you will have the patch, allowed to dry, see if decreases inflammation and itching.    Obtain a vitamin D level to assess replacement.    Continue physical activity as able.    Continue the gabapentin twice a day and meloxicam twice a day.    Follow-up with Dr. Campbell for return visit in 3 months

## 2024-09-12 NOTE — PROGRESS NOTES
Patient presents to the clinic today for a visit  with NATALY DUNCAN MD            6/19/2024     2:43 PM 9/12/2024     8:54 AM 9/12/2024     8:56 AM   PEG Score   PEG Total Score 8.67 4 6       UDS/CSA-2/9/24    Medications-buprenorphine     QUESTIONS:has a 10 mg on     [facility] [place]

## 2024-09-24 ENCOUNTER — HOSPITAL ENCOUNTER (EMERGENCY)
Facility: HOSPITAL | Age: 39
Discharge: HOME | End: 2024-09-24
Attending: EMERGENCY MEDICINE | Admitting: EMERGENCY MEDICINE
Payer: COMMERCIAL

## 2024-09-24 ENCOUNTER — APPOINTMENT (OUTPATIENT)
Dept: RADIOLOGY | Facility: HOSPITAL | Age: 39
End: 2024-09-24
Attending: EMERGENCY MEDICINE
Payer: COMMERCIAL

## 2024-09-24 VITALS
SYSTOLIC BLOOD PRESSURE: 135 MMHG | WEIGHT: 174 LBS | HEART RATE: 82 BPM | BODY MASS INDEX: 29.71 KG/M2 | OXYGEN SATURATION: 100 % | HEIGHT: 64 IN | DIASTOLIC BLOOD PRESSURE: 72 MMHG | RESPIRATION RATE: 16 BRPM | TEMPERATURE: 97.8 F

## 2024-09-24 DIAGNOSIS — L03.115 CELLULITIS OF RIGHT LOWER EXTREMITY: ICD-10-CM

## 2024-09-24 LAB
ANION GAP SERPL CALCULATED.3IONS-SCNC: 9 MMOL/L (ref 7–15)
B BURGDOR IGG+IGM SER QL: 0.03
BASOPHILS # BLD AUTO: 0.1 10E3/UL (ref 0–0.2)
BASOPHILS NFR BLD AUTO: 0 %
BUN SERPL-MCNC: 7.5 MG/DL (ref 6–20)
CALCIUM SERPL-MCNC: 10.1 MG/DL (ref 8.8–10.4)
CHLORIDE SERPL-SCNC: 105 MMOL/L (ref 98–107)
CREAT SERPL-MCNC: 0.69 MG/DL (ref 0.51–0.95)
CRP SERPL-MCNC: 31.5 MG/L
EGFRCR SERPLBLD CKD-EPI 2021: >90 ML/MIN/1.73M2
EOSINOPHIL # BLD AUTO: 0.1 10E3/UL (ref 0–0.7)
EOSINOPHIL NFR BLD AUTO: 1 %
ERYTHROCYTE [DISTWIDTH] IN BLOOD BY AUTOMATED COUNT: 11.8 % (ref 10–15)
ERYTHROCYTE [SEDIMENTATION RATE] IN BLOOD BY WESTERGREN METHOD: 10 MM/HR (ref 0–20)
GLUCOSE SERPL-MCNC: 82 MG/DL (ref 70–99)
HCO3 SERPL-SCNC: 22 MMOL/L (ref 22–29)
HCT VFR BLD AUTO: 42 % (ref 35–47)
HGB BLD-MCNC: 13.9 G/DL (ref 11.7–15.7)
IMM GRANULOCYTES # BLD: 0.1 10E3/UL
IMM GRANULOCYTES NFR BLD: 0 %
LYMPHOCYTES # BLD AUTO: 1.6 10E3/UL (ref 0.8–5.3)
LYMPHOCYTES NFR BLD AUTO: 13 %
MCH RBC QN AUTO: 31.5 PG (ref 26.5–33)
MCHC RBC AUTO-ENTMCNC: 33.1 G/DL (ref 31.5–36.5)
MCV RBC AUTO: 95 FL (ref 78–100)
MONOCYTES # BLD AUTO: 1 10E3/UL (ref 0–1.3)
MONOCYTES NFR BLD AUTO: 8 %
NEUTROPHILS # BLD AUTO: 9.8 10E3/UL (ref 1.6–8.3)
NEUTROPHILS NFR BLD AUTO: 78 %
NRBC # BLD AUTO: 0 10E3/UL
NRBC BLD AUTO-RTO: 0 /100
PLATELET # BLD AUTO: 300 10E3/UL (ref 150–450)
POTASSIUM SERPL-SCNC: 4.6 MMOL/L (ref 3.4–5.3)
RBC # BLD AUTO: 4.41 10E6/UL (ref 3.8–5.2)
SODIUM SERPL-SCNC: 136 MMOL/L (ref 135–145)
WBC # BLD AUTO: 12.6 10E3/UL (ref 4–11)

## 2024-09-24 PROCEDURE — 80048 BASIC METABOLIC PNL TOTAL CA: CPT | Performed by: EMERGENCY MEDICINE

## 2024-09-24 PROCEDURE — 99284 EMERGENCY DEPT VISIT MOD MDM: CPT | Performed by: EMERGENCY MEDICINE

## 2024-09-24 PROCEDURE — 86140 C-REACTIVE PROTEIN: CPT | Performed by: EMERGENCY MEDICINE

## 2024-09-24 PROCEDURE — 36415 COLL VENOUS BLD VENIPUNCTURE: CPT | Performed by: EMERGENCY MEDICINE

## 2024-09-24 PROCEDURE — 85025 COMPLETE CBC W/AUTO DIFF WBC: CPT | Performed by: EMERGENCY MEDICINE

## 2024-09-24 PROCEDURE — 85652 RBC SED RATE AUTOMATED: CPT | Performed by: EMERGENCY MEDICINE

## 2024-09-24 PROCEDURE — 86618 LYME DISEASE ANTIBODY: CPT | Performed by: EMERGENCY MEDICINE

## 2024-09-24 PROCEDURE — 73562 X-RAY EXAM OF KNEE 3: CPT | Mod: RT

## 2024-09-24 RX ORDER — AMOXICILLIN 500 MG/1
1000 CAPSULE ORAL 2 TIMES DAILY
Qty: 40 CAPSULE | Refills: 0 | Status: SHIPPED | OUTPATIENT
Start: 2024-09-24 | End: 2024-10-04

## 2024-09-24 RX ORDER — SULFAMETHOXAZOLE/TRIMETHOPRIM 800-160 MG
1 TABLET ORAL 2 TIMES DAILY
Qty: 20 TABLET | Refills: 0 | Status: SHIPPED | OUTPATIENT
Start: 2024-09-24 | End: 2024-10-04

## 2024-09-24 ASSESSMENT — COLUMBIA-SUICIDE SEVERITY RATING SCALE - C-SSRS
1. IN THE PAST MONTH, HAVE YOU WISHED YOU WERE DEAD OR WISHED YOU COULD GO TO SLEEP AND NOT WAKE UP?: NO
6. HAVE YOU EVER DONE ANYTHING, STARTED TO DO ANYTHING, OR PREPARED TO DO ANYTHING TO END YOUR LIFE?: NO
2. HAVE YOU ACTUALLY HAD ANY THOUGHTS OF KILLING YOURSELF IN THE PAST MONTH?: NO

## 2024-09-24 ASSESSMENT — ACTIVITIES OF DAILY LIVING (ADL)
ADLS_ACUITY_SCORE: 35
ADLS_ACUITY_SCORE: 33
ADLS_ACUITY_SCORE: 35

## 2024-09-24 ASSESSMENT — ENCOUNTER SYMPTOMS: FEVER: 0

## 2024-09-24 NOTE — ED PROVIDER NOTES
EMERGENCY DEPARTMENT ENCOUNTER      NAME: Judy Peterson  AGE: 39 year old female  YOB: 1985  MRN: [id number]  EVALUATION DATE & TIME: 9/24/2024  7:45 AM    PCP: Luz Carrasco    ED PROVIDER: Max Bolanos MD      Chief Complaint   Patient presents with    Knee Pain         FINAL IMPRESSION:  1. Cellulitis of right lower extremity          ED COURSE & MEDICAL DECISION MAKING:    Pertinent Labs & Imaging studies reviewed. (See chart for details)  39 year old female presents to the Emergency Department for evaluation of nontraumatic red swollen right knee    She is able to ambulate but does have painful range of motion but can bend her knee    No history of gout    History of phenylketonuria, diet control    Does get rashes does have chronic rash anterior knee    Likely cellulitis.  Doubt fracture    ED Course as of 09/24/24 1037   Tue Sep 24, 2024   1020 Anterior soft tissue swelling right knee.  I independently viewed the x-ray and did not note any fracture   1021 Patient has redness swelling and tenderness to anterior right knee.  Patient has a clinically apparent cellulitis.  Septic joint seems less likely.  X-rays without joint effusion.  Bedside ultrasound without any significant joint effusion.  CRP elevated, sed rate normal, white count elevated, kidney function normal.  Lyme test pending.  Will reach out to [facility]   1035 X-ray without joint effusion.  Bedside ultrasound without joint effusion.  Images not saved however.   1036 We have no joint effusion of concern about unsuccessful tap.  I discussed with orthopedics who did evaluate the pictures and thought this was an anterior knee cellulitis which I think is likely.  I think septic joint unlikely   1036 Discharged home on amoxicillin which the patient has tolerated previously as well as Bactrim and patient will follow-up orthopedics as scheduled tomorrow   10:30 AM Spoke with Dr. Stacy MD of orthopedics, recommended not tapping the knee  until orthopedics tomorrow.   10:33 AM Updated patient at bedside.    Medical Decision Making  Obtained supplemental history:Supplemental history obtained?: No  Reviewed external records: External records reviewed?: No  Care impacted by chronic illness:Smoking / Nicotine Use  Care significantly affected by social determinants of health:Access to Medical Care  Did you consider but not order tests?: Work up considered but not performed and documented in chart, if applicable  Did you interpret images independently?: Independent interpretation of ECG and images noted in documentation, when applicable.  Consultation discussion with other provider:Did you involve another provider (consultant, , pharmacy, etc.)?: I discussed the care with another health care provider, see documentation for details.  Discharge. I prescribed additional prescription strength medication(s) as charted. N/A.  Not Applicable                At the conclusion of the encounter I discussed the results of all of the tests and the disposition. The questions were answered. The patient or family acknowledged understanding and was agreeable with the care plan.       MEDICATIONS GIVEN IN THE EMERGENCY:  Medications - No data to display    NEW PRESCRIPTIONS STARTED AT TODAY'S ER VISIT  New Prescriptions    AMOXICILLIN (AMOXIL) 500 MG CAPSULE    Take 2 capsules (1,000 mg) by mouth 2 times daily for 10 days.    SULFAMETHOXAZOLE-TRIMETHOPRIM (BACTRIM DS) 800-160 MG TABLET    Take 1 tablet by mouth 2 times daily for 10 days.          =================================================================    HPI    Patient information was obtained from: Patient    Use of : N/A         Judy Peterson is a 39 year old female with a pertinent history of reported hip dysplasia, who presents to this ED for evaluation of right knee redness and pain. Patient endorses right anterior knee redness, pain, and decreased range of motion recently. Patient denies her  right knee feeling swollen, she denies pain behind her right knee. Patient denies recent increased labor, recent a recent bite, scratch, new lotions, or creams to her right knee.     Patient denies a history of gout. Patient denies fever, rashes, or any other symptoms at this time.      REVIEW OF SYSTEMS   Review of Systems   Constitutional:  Negative for fever.   Musculoskeletal:         Positive for right knee redness, pain, reduced range of motion   Skin:  Negative for rash.        PAST MEDICAL HISTORY:  Past Medical History:   Diagnosis Date    Anxiety     Uncomplicated asthma        PAST SURGICAL HISTORY:  Past Surgical History:   Procedure Laterality Date    HERNIA REPAIR Right            CURRENT MEDICATIONS:    amoxicillin (AMOXIL) 500 MG capsule  sulfamethoxazole-trimethoprim (BACTRIM DS) 800-160 MG tablet  acetaminophen (TYLENOL) 500 MG tablet  albuterol (PROAIR HFA/PROVENTIL HFA/VENTOLIN HFA) 108 (90 Base) MCG/ACT inhaler  budesonide-formoterol (SYMBICORT) 160-4.5 MCG/ACT Inhaler  buprenorphine (BUTRANS) 20 MCG/HR WK patch  buPROPion (WELLBUTRIN XL) 150 MG 24 hr tablet  fluticasone (FLONASE) 50 MCG/ACT nasal spray  meloxicam (MOBIC) 15 MG tablet  montelukast (SINGULAIR) 10 MG tablet  nicotine (COMMIT) 2 MG lozenge  nicotine (NICODERM CQ) 14 MG/24HR 24 hr patch  ondansetron (ZOFRAN ODT) 4 MG ODT tab  tiZANidine (ZANAFLEX) 4 MG tablet  valACYclovir (VALTREX) 1000 mg tablet  vitamin D2 (ERGOCALCIFEROL) 88943 units (1250 mcg) capsule        ALLERGIES:  Allergies   Allergen Reactions    Cefprozil Hives       FAMILY HISTORY:  Family History   Problem Relation Age of Onset    Rheumatoid Arthritis Mother        SOCIAL HISTORY:   Social History     Socioeconomic History    Marital status: Single   Tobacco Use    Smoking status: Every Day     Current packs/day: 0.50     Types: Cigarettes     Passive exposure: Current    Smokeless tobacco: Never   Vaping Use    Vaping status: Never Used   Substance and Sexual  "Activity    Alcohol use: No    Drug use: No     Social Determinants of Health     Financial Resource Strain: Low Risk  (1/8/2024)    Financial Resource Strain     Within the past 12 months, have you or your family members you live with been unable to get utilities (heat, electricity) when it was really needed?: No   Food Insecurity: High Risk (1/8/2024)    Food Insecurity     Within the past 12 months, did you worry that your food would run out before you got money to buy more?: No     Within the past 12 months, did the food you bought just not last and you didn t have money to get more?: Yes   Transportation Needs: Low Risk  (1/8/2024)    Transportation Needs     Within the past 12 months, has lack of transportation kept you from medical appointments, getting your medicines, non-medical meetings or appointments, work, or from getting things that you need?: No   Interpersonal Safety: Low Risk  (1/8/2024)    Interpersonal Safety     Do you feel physically and emotionally safe where you currently live?: Yes     Within the past 12 months, have you been hit, slapped, kicked or otherwise physically hurt by someone?: No     Within the past 12 months, have you been humiliated or emotionally abused in other ways by your partner or ex-partner?: No   Housing Stability: High Risk (1/8/2024)    Housing Stability     Do you have housing? : No     Are you worried about losing your housing?: No       VITALS:  /72   Pulse 82   Temp 97.8  F (36.6  C) (Temporal)   Resp 16   Ht 1.626 m (5' 4\")   Wt 78.9 kg (174 lb)   LMP 09/18/2024 (Exact Date)   SpO2 100%   BMI 29.87 kg/m      PHYSICAL EXAM      Vitals: /72   Pulse 82   Temp 97.8  F (36.6  C) (Temporal)   Resp 16   Ht 1.626 m (5' 4\")   Wt 78.9 kg (174 lb)   LMP 09/18/2024 (Exact Date)   SpO2 100%   BMI 29.87 kg/m    General: Appears in no acute distress, awake, alert, interactive.  Eyes: Conjunctivae non-injected. Sclera anicteric.  HENT: " Atraumatic.  Neck: Supple.  Respiratory/Chest: Respiration unlabored.  Abdomen: non distended  Musculoskeletal: Swelling to anterior right knee associated tenderness redness and warmth.  See photos below.  Decreased range of motion  Skin: Normal color. No rash or diaphoresis.  Neurologic: Face symmetric, moves all extremities spontaneously. Speech clear.  Psychiatric: Oriented to person, place, and time. Affect appropriate.                  LAB:  All pertinent labs reviewed and interpreted.  Results for orders placed or performed during the hospital encounter of 09/24/24   XR Knee Right 3 Views    Impression    IMPRESSION: Anterior knee soft tissue swelling. No acute fracture or malalignment. There is normal joint spacing. No knee joint effusion.   Result Value Ref Range    CRP Inflammation 31.50 (H) <5.00 mg/L   Erythrocyte sedimentation rate auto   Result Value Ref Range    Erythrocyte Sedimentation Rate 10 0 - 20 mm/hr   Basic metabolic panel   Result Value Ref Range    Sodium 136 135 - 145 mmol/L    Potassium 4.6 3.4 - 5.3 mmol/L    Chloride 105 98 - 107 mmol/L    Carbon Dioxide (CO2) 22 22 - 29 mmol/L    Anion Gap 9 7 - 15 mmol/L    Urea Nitrogen 7.5 6.0 - 20.0 mg/dL    Creatinine 0.69 0.51 - 0.95 mg/dL    GFR Estimate >90 >60 mL/min/1.73m2    Calcium 10.1 8.8 - 10.4 mg/dL    Glucose 82 70 - 99 mg/dL   CBC with platelets and differential   Result Value Ref Range    WBC Count 12.6 (H) 4.0 - 11.0 10e3/uL    RBC Count 4.41 3.80 - 5.20 10e6/uL    Hemoglobin 13.9 11.7 - 15.7 g/dL    Hematocrit 42.0 35.0 - 47.0 %    MCV 95 78 - 100 fL    MCH 31.5 26.5 - 33.0 pg    MCHC 33.1 31.5 - 36.5 g/dL    RDW 11.8 10.0 - 15.0 %    Platelet Count 300 150 - 450 10e3/uL    % Neutrophils 78 %    % Lymphocytes 13 %    % Monocytes 8 %    % Eosinophils 1 %    % Basophils 0 %    % Immature Granulocytes 0 %    NRBCs per 100 WBC 0 <1 /100    Absolute Neutrophils 9.8 (H) 1.6 - 8.3 10e3/uL    Absolute Lymphocytes 1.6 0.8 - 5.3 10e3/uL     Absolute Monocytes 1.0 0.0 - 1.3 10e3/uL    Absolute Eosinophils 0.1 0.0 - 0.7 10e3/uL    Absolute Basophils 0.1 0.0 - 0.2 10e3/uL    Absolute Immature Granulocytes 0.1 <=0.4 10e3/uL    Absolute NRBCs 0.0 10e3/uL       RADIOLOGY:  Reviewed all pertinent imaging. Please see official radiology report.  XR Knee Right 3 Views   Final Result   IMPRESSION: Anterior knee soft tissue swelling. No acute fracture or malalignment. There is normal joint spacing. No knee joint effusion.              I, Roc Ventura, am serving as a scribe to document services personally performed by Max Bolanos MD based on my observation and the provider's statements to me. I, Max Bolanos MD, attest that Roc Ventura is acting in a scribe capacity, has observed my performance of the services and has documented them in accordance with my direction.    Max Bolanos MD  [facility] EMERGENCY DEPARTMENT  [address] [place] [id number] [phone]      Max Bolanos MD  09/24/24 1038

## 2024-09-24 NOTE — DISCHARGE INSTRUCTIONS
As we discussed keep your appointment with orthopedics tomorrow.  Start amoxicillin twice a day.  Start Bactrim twice a day.  Return to the ER for worsening symptoms

## 2024-09-24 NOTE — Clinical Note
Judy Peterson was seen and treated in our emergency department on 9/24/2024.  She may return to work on 09/26/2024.       If you have any questions or concerns, please don't hesitate to call.      Max Bolanos MD

## 2024-09-24 NOTE — ED TRIAGE NOTES
Pt arrives to ED ambulatory via private vehicle from home.    Pt arrives with red, swollen, painful, and hot to touch right knee that started yesterday. No known trauma, bug, or plant exposure.    Pt oriented to department, standard department flow, and updated on immediate plan of care.      Triage Assessment (Adult)       Row Name 09/24/24 0742          Triage Assessment    Airway WDL WDL        Respiratory WDL    Respiratory WDL WDL        Skin Circulation/Temperature WDL    Skin Circulation/Temperature WDL X  right knee is red, swollen, and hot to the touch        Cardiac WDL    Cardiac WDL WDL        Peripheral/Neurovascular WDL    Peripheral Neurovascular WDL WDL        Cognitive/Neuro/Behavioral WDL    Cognitive/Neuro/Behavioral WDL WDL

## 2024-09-25 ENCOUNTER — PATIENT OUTREACH (OUTPATIENT)
Dept: FAMILY MEDICINE | Facility: CLINIC | Age: 39
End: 2024-09-25
Payer: COMMERCIAL

## 2024-09-25 ENCOUNTER — TRANSFERRED RECORDS (OUTPATIENT)
Dept: HEALTH INFORMATION MANAGEMENT | Facility: CLINIC | Age: 39
End: 2024-09-25
Payer: COMMERCIAL

## 2024-09-25 DIAGNOSIS — R11.0 NAUSEA: Primary | ICD-10-CM

## 2024-09-25 RX ORDER — ONDANSETRON 4 MG/1
4 TABLET, FILM COATED ORAL EVERY 8 HOURS PRN
Qty: 20 TABLET | Refills: 0 | Status: SHIPPED | OUTPATIENT
Start: 2024-09-25

## 2024-09-25 NOTE — TELEPHONE ENCOUNTER
Called and relayed message from Luz Carrasco, Patient verbalized understanding and stated she did just finish her appointment with orthopedics and was ordered some pain medication.     Lb Gandara RN  [facility]

## 2024-09-25 NOTE — TELEPHONE ENCOUNTER
It looks like she was to see orthopedics today.  I have not evaluated her for this condition. She is to discuss pain management with them.  Thanks.

## 2024-09-25 NOTE — TELEPHONE ENCOUNTER
Transitions of Care Outreach  Chief Complaint   Patient presents with    Hospital F/U       Most Recent Admission Date: 9/24/2024   Most Recent Admission Diagnosis:      Most Recent Discharge Date: 9/24/2024   Most Recent Discharge Diagnosis: Cellulitis of right lower extremity - L03.115     Transitions of Care Assessment    Discharge Assessment  How are you doing now that you are home?: painful  How are your symptoms? (Red Flag symptoms escalate to triage hotline per guidelines): Unchanged  Do you know how to contact your clinic care team if you have future questions or changes to your health status? : Yes  Does the patient have their discharge instructions? : Yes  Does the patient have questions regarding their discharge instructions? : No  Were you started on any new medications or were there changes to any of your previous medications? : Yes  Does the patient have all of their medications?: Yes  Do you have questions regarding any of your medications? : No  Do you have all of your needed medical supplies or equipment (DME)?  (i.e. oxygen tank, CPAP, cane, etc.): Yes    Follow up Plan     Discharge Follow-Up  Discharge follow up appointment scheduled in alignment with recommended follow up timeframe or Transitions of Risk Category? (Low = within 30 days; Moderate= within 14 days; High= within 7 days): Yes  Discharge Follow Up Appointment Date: 10/09/24  Discharge Follow Up Appointment Scheduled with?: Primary Care Provider    Future Appointments   Date Time Provider Department Center   10/9/2024  9:00 AM Luz Carrasco APRN CNP [id number]       Outpatient Plan as outlined on AVS reviewed with patient.    For any urgent concerns, please contact our 24 hour nurse triage line: [phone])       Kelly BARRY RN

## 2024-09-25 NOTE — TELEPHONE ENCOUNTER
Called and spoke to patient for hospital follow up. Patient reports ongoing pain in the leg 9/10 and is requesting input from the provider regarding pain medication. Patient also reports ongoing nausea  and lack of appetite and requests provider recommendations for management of nausea.    Routing to provider for review and recommendations.    Kelly Singh RN  [facility]

## 2024-10-07 ENCOUNTER — MYC REFILL (OUTPATIENT)
Dept: FAMILY MEDICINE | Facility: CLINIC | Age: 39
End: 2024-10-07
Payer: COMMERCIAL

## 2024-10-07 DIAGNOSIS — R11.0 NAUSEA: ICD-10-CM

## 2024-10-08 RX ORDER — ONDANSETRON 4 MG/1
4 TABLET, FILM COATED ORAL EVERY 8 HOURS PRN
Qty: 20 TABLET | Refills: 0 | Status: SHIPPED | OUTPATIENT
Start: 2024-10-08

## 2024-10-08 RX ORDER — OXYCODONE HYDROCHLORIDE 5 MG/1
5 TABLET ORAL 3 TIMES DAILY PRN
COMMUNITY
Start: 2024-09-25 | End: 2024-10-09

## 2024-10-08 RX ORDER — AMOXICILLIN 500 MG/1
CAPSULE ORAL
COMMUNITY
Start: 2024-10-04 | End: 2024-10-09

## 2024-10-09 ENCOUNTER — OFFICE VISIT (OUTPATIENT)
Dept: FAMILY MEDICINE | Facility: CLINIC | Age: 39
End: 2024-10-09
Payer: COMMERCIAL

## 2024-10-09 VITALS
HEART RATE: 79 BPM | OXYGEN SATURATION: 98 % | WEIGHT: 172 LBS | BODY MASS INDEX: 29.37 KG/M2 | HEIGHT: 64 IN | RESPIRATION RATE: 20 BRPM | DIASTOLIC BLOOD PRESSURE: 72 MMHG | SYSTOLIC BLOOD PRESSURE: 107 MMHG | TEMPERATURE: 98.4 F

## 2024-10-09 DIAGNOSIS — L03.115 CELLULITIS OF RIGHT KNEE: ICD-10-CM

## 2024-10-09 DIAGNOSIS — M70.51 BURSITIS OF RIGHT KNEE, UNSPECIFIED BURSA: ICD-10-CM

## 2024-10-09 DIAGNOSIS — B00.9 HERPES SIMPLEX TYPE 1 INFECTION: Primary | ICD-10-CM

## 2024-10-09 DIAGNOSIS — J45.30 MILD PERSISTENT ASTHMA WITHOUT COMPLICATION: ICD-10-CM

## 2024-10-09 DIAGNOSIS — E55.9 VITAMIN D DEFICIENCY: ICD-10-CM

## 2024-10-09 LAB
ALBUMIN SERPL BCG-MCNC: 4.3 G/DL (ref 3.5–5.2)
ALP SERPL-CCNC: 70 U/L (ref 40–150)
ALT SERPL W P-5'-P-CCNC: 12 U/L (ref 0–50)
ANION GAP SERPL CALCULATED.3IONS-SCNC: 9 MMOL/L (ref 7–15)
AST SERPL W P-5'-P-CCNC: 15 U/L (ref 0–45)
BILIRUB SERPL-MCNC: 0.3 MG/DL
BUN SERPL-MCNC: 4.7 MG/DL (ref 6–20)
CALCIUM SERPL-MCNC: 9.6 MG/DL (ref 8.8–10.4)
CHLORIDE SERPL-SCNC: 108 MMOL/L (ref 98–107)
CREAT SERPL-MCNC: 0.64 MG/DL (ref 0.51–0.95)
EGFRCR SERPLBLD CKD-EPI 2021: >90 ML/MIN/1.73M2
GLUCOSE SERPL-MCNC: 83 MG/DL (ref 70–99)
HCO3 SERPL-SCNC: 21 MMOL/L (ref 22–29)
POTASSIUM SERPL-SCNC: 4.1 MMOL/L (ref 3.4–5.3)
PROT SERPL-MCNC: 6.6 G/DL (ref 6.4–8.3)
SODIUM SERPL-SCNC: 138 MMOL/L (ref 135–145)
VIT D+METAB SERPL-MCNC: 25 NG/ML (ref 20–50)

## 2024-10-09 PROCEDURE — 80053 COMPREHEN METABOLIC PANEL: CPT | Performed by: NURSE PRACTITIONER

## 2024-10-09 PROCEDURE — 99214 OFFICE O/P EST MOD 30 MIN: CPT | Performed by: NURSE PRACTITIONER

## 2024-10-09 PROCEDURE — 36415 COLL VENOUS BLD VENIPUNCTURE: CPT | Performed by: NURSE PRACTITIONER

## 2024-10-09 PROCEDURE — 82306 VITAMIN D 25 HYDROXY: CPT | Performed by: NURSE PRACTITIONER

## 2024-10-09 RX ORDER — ERGOCALCIFEROL 1.25 MG/1
50000 CAPSULE, LIQUID FILLED ORAL WEEKLY
Qty: 12 CAPSULE | Refills: 0 | Status: CANCELLED | OUTPATIENT
Start: 2024-10-09

## 2024-10-09 RX ORDER — VALACYCLOVIR HYDROCHLORIDE 1 G/1
TABLET, FILM COATED ORAL
Qty: 4 TABLET | Refills: 11 | Status: SHIPPED | OUTPATIENT
Start: 2024-10-09

## 2024-10-09 ASSESSMENT — ASTHMA QUESTIONNAIRES
QUESTION_2 LAST FOUR WEEKS HOW OFTEN HAVE YOU HAD SHORTNESS OF BREATH: THREE TO SIX TIMES A WEEK
ACT_TOTALSCORE: 12
QUESTION_5 LAST FOUR WEEKS HOW WOULD YOU RATE YOUR ASTHMA CONTROL: SOMEWHAT CONTROLLED
ACT_TOTALSCORE: 12
QUESTION_4 LAST FOUR WEEKS HOW OFTEN HAVE YOU USED YOUR RESCUE INHALER OR NEBULIZER MEDICATION (SUCH AS ALBUTEROL): THREE OR MORE TIMES PER DAY
QUESTION_1 LAST FOUR WEEKS HOW MUCH OF THE TIME DID YOUR ASTHMA KEEP YOU FROM GETTING AS MUCH DONE AT WORK, SCHOOL OR AT HOME: SOME OF THE TIME
QUESTION_3 LAST FOUR WEEKS HOW OFTEN DID YOUR ASTHMA SYMPTOMS (WHEEZING, COUGHING, SHORTNESS OF BREATH, CHEST TIGHTNESS OR PAIN) WAKE YOU UP AT NIGHT OR EARLIER THAN USUAL IN THE MORNING: TWO OR THREE NIGHTS A WEEK

## 2024-10-09 ASSESSMENT — PAIN SCALES - GENERAL: PAINLEVEL: NO PAIN (0)

## 2024-10-09 NOTE — PROGRESS NOTES
Assessment and Plan:     Herpes simplex type 1 infection  She continues valacyclovir as needed.  - valACYclovir (VALTREX) 1000 mg tablet  Dispense: 4 tablet; Refill: 11    Vitamin D deficiency  She is not taking a supplement.  She recently completed high-dose treatment.  Will check vitamin D.  - Vitamin D Deficiency  - Vitamin D Deficiency    Cellulitis of right knee  Bursitis of right knee, unspecified bursa  This has improved.  She did not complete the full course of Augmentin.  She has an appointment with [facility] today.  No signs of septic joint today.  Discussed symptomatic treatment.    Mild persistent asthma without complication  Patient continues Symbicort daily.  I encouraged her to use montelukast daily will which will likely result in less albuterol use.  Discussed smoking cessation options, but she declines.        Subjective:     Judy is a 39 year old female presenting to the clinic for follow-up on ER evaluation.  Patient was seen in the ER on 9/24/2024 with concerns of right knee pain.  Secondary concern x-ray showed no fracture.  Bedside ultrasound showed no joint effusion.  CRP and white blood count were elevated.  She was discharged with amoxicillin and Bactrim.  Patient has been followed by [facility].  After completing her first round of antibiotics, she was treated with Augmentin.  Patient states the swelling is improved.  She is afebrile.  She denies pain today, but complains of limited range of motion.  Patient requests refill of valacyclovir.  She is taking this as needed for herpes simplex type I.  She tends to develop cold sores during the winter.  Patient recently completed high-dose vitamin D treatment.  She is not taking the supplement.  She requests recheck today.  She has mild persistent asthma and is using Symbicort daily.  She continues to use albuterol daily and has been inconsistent with taking montelukast.  She continues to smoke.    [employer] Systems: A  complete 14 point review of systems was obtained and is negative or as stated in the history of present illness.    Social History     Socioeconomic History    Marital status: Single     Spouse name: Not on file    Number of children: Not on file    Years of education: Not on file    Highest education level: Not on file   Occupational History    Not on file   Tobacco Use    Smoking status: Every Day     Current packs/day: 0.50     Types: Cigarettes     Passive exposure: Current    Smokeless tobacco: Never   Vaping Use    Vaping status: Never Used   Substance and Sexual Activity    Alcohol use: No    Drug use: No    Sexual activity: Not on file   Other Topics Concern    Not on file   Social History Narrative    Not on file     Social Determinants of Health     Financial Resource Strain: Low Risk  (1/8/2024)    Financial Resource Strain     Within the past 12 months, have you or your family members you live with been unable to get utilities (heat, electricity) when it was really needed?: No   Food Insecurity: High Risk (1/8/2024)    Food Insecurity     Within the past 12 months, did you worry that your food would run out before you got money to buy more?: No     Within the past 12 months, did the food you bought just not last and you didn t have money to get more?: Yes   Transportation Needs: Low Risk  (1/8/2024)    Transportation Needs     Within the past 12 months, has lack of transportation kept you from medical appointments, getting your medicines, non-medical meetings or appointments, work, or from getting things that you need?: No   Physical Activity: Not on file   Stress: Not on file   Social Connections: Not on file   Interpersonal Safety: Low Risk  (10/9/2024)    Interpersonal Safety     Do you feel physically and emotionally safe where you currently live?: Yes     Within the past 12 months, have you been hit, slapped, kicked or otherwise physically hurt by someone?: No     Within the past 12 months, have you  "been humiliated or emotionally abused in other ways by your partner or ex-partner?: No   Housing Stability: High Risk (1/8/2024)    Housing Stability     Do you have housing? : No     Are you worried about losing your housing?: No       Active Ambulatory Problems     Diagnosis Date Noted    Phenylketonuria     Anxiety     Attention deficit hyperactivity disorder (ADHD)     Smoking 10/04/2016    Mild persistent asthma without complication 07/31/2020    Acute bacterial rhinosinusitis 12/04/2023    Encounter for tobacco use cessation counseling 12/04/2023     Resolved Ambulatory Problems     Diagnosis Date Noted    No Resolved Ambulatory Problems     Past Medical History:   Diagnosis Date    Anxiety     Uncomplicated asthma        Family History   Problem Relation Age of Onset    Rheumatoid Arthritis Mother        Objective:     /72   Pulse 79   Temp 98.4  F (36.9  C)   Resp 20   Ht 1.626 m (5' 4\")   Wt 78 kg (172 lb)   LMP 09/18/2024 (Exact Date)   SpO2 98%   BMI 29.52 kg/m      Patient is alert, in no obvious distress.   Skin: Warm, dry.    Lungs:  Clear to auscultation. Respirations even and unlabored.  No wheezing or rales noted.   Heart:  Regular rate and rhythm.  No murmurs, S3, S4, gallops, or rubs.    Musculoskeletal: She has limited flexion of the knee.  Mild bursitis is present within the right knee.  This is nontender to palpation.  Mild erythema is present.          "

## 2024-10-17 RX ORDER — CHOLECALCIFEROL (VITAMIN D3) 50 MCG
2 TABLET ORAL DAILY
Qty: 60 TABLET | Refills: 3 | Status: SHIPPED | OUTPATIENT
Start: 2024-10-17

## 2024-11-27 DIAGNOSIS — M25.552 CHRONIC PAIN OF BOTH HIPS: Primary | ICD-10-CM

## 2024-11-27 DIAGNOSIS — M25.551 CHRONIC PAIN OF BOTH HIPS: Primary | ICD-10-CM

## 2024-11-27 DIAGNOSIS — G89.29 CHRONIC PAIN OF BOTH HIPS: Primary | ICD-10-CM

## 2024-11-27 RX ORDER — BUPRENORPHINE 20 UG/H
1 PATCH TRANSDERMAL
COMMUNITY
Start: 2024-11-04 | End: 2024-11-27

## 2024-11-27 RX ORDER — BUPRENORPHINE 20 UG/H
1 PATCH TRANSDERMAL
Qty: 4 PATCH | Refills: 2 | Status: SHIPPED | OUTPATIENT
Start: 2024-11-27

## 2024-11-27 NOTE — TELEPHONE ENCOUNTER
Pending Prescriptions:                       Disp   Refills    buprenorphine (BUTRANS) 20 MCG/HR WK patch4 patch             Sig: Place 1 patch onto the skin every 7 days.    Signed Prescriptions:                        Disp   Refills    buprenorphine (BUTRANS) 20 MCG/HR WK patch                 Sig: Place 1 patch onto the skin every 7 days.  Authorizing Provider: PATIENT REPORTED  Ordering User: MAGUE OLIVA    [facility] [place] [address]   
Received request for refill(s)     buprenorphine (BUTRANS) 20 MCG/HR WK patch     Last dispensed from pharmacy on 11/04/24    Patient's last office/virtual visit by prescribing provider on 9/12/24  Next office/virtual appointment scheduled for 12/9/24    Last urine drug screen date 2/9/24 Current opioid agreement on file (completed within the last year) Yes Date of opioid agreement: 2/9/24     E-prescribe to     [address] [place] [address]      Will route to [facility] for review and preparation of prescription(s).      Luzma Perez MA  [facility]  
4

## 2024-12-30 ENCOUNTER — OFFICE VISIT (OUTPATIENT)
Dept: PALLIATIVE MEDICINE | Facility: OTHER | Age: 39
End: 2024-12-30
Attending: ANESTHESIOLOGY
Payer: COMMERCIAL

## 2024-12-30 VITALS — HEART RATE: 77 BPM | DIASTOLIC BLOOD PRESSURE: 74 MMHG | OXYGEN SATURATION: 96 % | SYSTOLIC BLOOD PRESSURE: 130 MMHG

## 2024-12-30 DIAGNOSIS — M25.552 CHRONIC PAIN OF BOTH HIPS: ICD-10-CM

## 2024-12-30 DIAGNOSIS — M25.551 CHRONIC PAIN OF BOTH HIPS: ICD-10-CM

## 2024-12-30 DIAGNOSIS — G89.29 CHRONIC PAIN OF BOTH HIPS: ICD-10-CM

## 2024-12-30 LAB
CANNABINOIDS UR QL SCN: NORMAL
CREAT UR-MCNC: 117 MG/DL
ETHANOL UR QL SCN: NORMAL

## 2024-12-30 PROCEDURE — 80307 DRUG TEST PRSMV CHEM ANLYZR: CPT | Performed by: ANESTHESIOLOGY

## 2024-12-30 PROCEDURE — 80355 GABAPENTIN NON-BLOOD: CPT | Performed by: ANESTHESIOLOGY

## 2024-12-30 PROCEDURE — 99213 OFFICE O/P EST LOW 20 MIN: CPT | Performed by: ANESTHESIOLOGY

## 2024-12-30 PROCEDURE — 80366 DRUG SCREENING PREGABALIN: CPT | Performed by: ANESTHESIOLOGY

## 2024-12-30 PROCEDURE — G0463 HOSPITAL OUTPT CLINIC VISIT: HCPCS | Performed by: ANESTHESIOLOGY

## 2024-12-30 PROCEDURE — G2211 COMPLEX E/M VISIT ADD ON: HCPCS | Performed by: ANESTHESIOLOGY

## 2024-12-30 PROCEDURE — 80360 METHYLPHENIDATE: CPT | Performed by: ANESTHESIOLOGY

## 2024-12-30 RX ORDER — BUPRENORPHINE 20 UG/H
1 PATCH TRANSDERMAL
Qty: 4 PATCH | Refills: 2 | Status: SHIPPED | OUTPATIENT
Start: 2024-12-30

## 2024-12-30 ASSESSMENT — PAIN SCALES - GENERAL: PAINLEVEL_OUTOF10: NO PAIN (0)

## 2024-12-30 NOTE — PATIENT INSTRUCTIONS
[facility] Number:  [phone]  Call with any questions about your care and for scheduling assistance.   Calls are returned Monday through Friday between 8 AM and 4:30 PM. We usually get back to you within 2 business days depending on the issue/request.    If we are prescribing your medications:  For opioid medication refills, call the clinic or send a Kivat message 7 days in advance.  Please include:  Name of requested medication  Name of the pharmacy.  For non-opioid medications, call your pharmacy directly to request a refill. Please allow 3-4 days to be processed.   Per [place] State Law:  All controlled substance prescriptions must be filled within 30 days of being written.    For those controlled substances allowing refills, pickup must occur within 30 days of last fill.      We believe regular attendance is key to your success in our program!    Any time you are unable to keep your appointment we ask that you call us at least 24 hours in advance to cancel.This will allow us to offer the appointment time to another patient.   Multiple missed appointments may lead to dismissal from the clinic.       PLAN:    Continue the Butrans patch 20 mcg weekly.    Continue Flonase nasal spray to see if it helps decrease sensitivity of the skin.    Follow-up with Dr. Campbell for return visit in 3 months

## 2024-12-30 NOTE — PROGRESS NOTES
Patient presents to the clinic today for a visit with NATALY DUNCAN MD regarding Pain Management.          9/12/2024     8:54 AM 9/12/2024     8:56 AM 12/30/2024     3:32 PM   PEG Score   PEG Total Score 4 6 0       UDS/CSA- 02.09.2024    Medications- Day 4    Notes    Radha BAIRD [facility] Clinical Assistant

## 2024-12-30 NOTE — LETTER

## 2024-12-30 NOTE — PROGRESS NOTES
"[facility] Follow-up    Date of visit: 12/30/2024    Chief complaint:   Chief Complaint   Patient presents with    Pain       Follow-up for hip pain    Has worked with our [place] pharmacist, using the Butrans patch 20 mcg, using Flonase nasal spray to help with some skin irritation.    Reviews today very happy about pain control with her hips.  She is \"bone-on-bone\", sometimes feels like the hips can get \"caught\" but otherwise fairly active.  She has been told she is too young for hip replacement yet.    Does note that the Flonase helps some of the irritation but still develops a rash.  She has moved in the past show on her right forearm and indeed is erythematous.  Shows on the left forearm where it is resolved, tends to wear off by the time will be need to apply in this situation.    She recalls the Belbuca did not work on the side of her cheek.  Suboxone was \"too strong\", recalls vomiting for 10 hours.  Does not want to try even low doses with fragments of the film.    Feels this regimen is helping she would manage the side effects.    Also notes when she puts it on for example on a Thursday, on Friday she would have nausea and some vomiting that day but good for the rest of the week.  Again when changes it notices the next day.  Feels again this side effect is acceptable.    The Butrans patch 15 mcg was not helpful enough for pain.    Is taking vitamin D replacement.    Last urine drug test in February so will be obtained today.   reviewed            Medications:  Current Outpatient Medications   Medication Sig Dispense Refill    acetaminophen (TYLENOL) 500 MG tablet Take 500-1,000 mg by mouth as needed for mild pain      albuterol (PROAIR HFA/PROVENTIL HFA/VENTOLIN HFA) 108 (90 Base) MCG/ACT inhaler Inhale 2 puffs into the lungs every 4 hours as needed for shortness of breath or wheezing 18 g 1    budesonide-formoterol (SYMBICORT) 160-4.5 MCG/ACT Inhaler " Inhale 2 puffs twice daily plus 1-2 puffs as needed. May use up to 12 puffs per day. 20.4 g 11    buprenorphine (BUTRANS) 20 MCG/HR WK patch Place 1 patch onto the skin every 7 days. 4 patch 2    fluticasone (FLONASE) 50 MCG/ACT nasal spray Spray 1 spray into both nostrils daily. May spray on to skin and allow to dry before applying butrans patch 9.9 mL 1    meloxicam (MOBIC) 15 MG tablet Take 15 mg by mouth daily      montelukast (SINGULAIR) 10 MG tablet Take 1 tablet (10 mg) by mouth at bedtime 90 tablet 3    ondansetron (ZOFRAN ODT) 4 MG ODT tab Take 1 tablet (4 mg) by mouth every 6 hours as needed for nausea Ok to take 2 tabs for 1st dose. 15 tablet 0    ondansetron (ZOFRAN) 4 MG tablet Take 1 tablet (4 mg) by mouth every 8 hours as needed for nausea. 20 tablet 0    tiZANidine (ZANAFLEX) 4 MG tablet TAKE ONE TABLET BY MOUTH AT BEDTIME 30 tablet 0    valACYclovir (VALTREX) 1000 mg tablet TAKE TWO TABLETS BY MOUTH TWICE A DAY 4 tablet 11    vitamin D2 (ERGOCALCIFEROL) 42366 units (1250 mcg) capsule Take 1 capsule (50,000 Units) by mouth once a week 12 capsule 0    vitamin D3 (CHOLECALCIFEROL) 50 mcg (2000 units) tablet Take 2 tablets (100 mcg) by mouth daily. 60 tablet 3           Physical Exam:  Blood pressure 130/74, pulse 77, SpO2 96%, not currently breastfeeding.    Alert, clear sensorium, no respiratory distress, no pain behavior.    As noted the right upper arm with some erythema in the area of the patches, she removed it and left it in the car to show.  Works when she reapplies it.  The left arm is barely residual in that area.          Assessment:   Osteoarthritis right hip, too young for surgical intervention.    She is pleased with the response of the Butrans 20 mcg patch to help for her pain and function.    She tolerates having nausea and vomiting the day after she changes the patch and the skin irritation compared to other options such as trying fragments of the Suboxone.    This plan: Continue the  Butrans patch 20 mcg weekly, with the Flonase nasal spray applied before, discussed some other skin care options.    She will follow-up in 3 months.    Total time 25 minutesThe longitudinal plan of care for the diagnosis(es)/condition(s) as documented were addressed during this visit. Due to the added complexity in care, I will continue to support Judy in the subsequent management and with ongoing continuity of care.         minutes spent on the date of encounter doing chart review, history, and exam documentation and further activities as noted above.     Sudhakar Campbell MD  [facility]

## 2025-01-02 LAB
BUPRENORPHINE UR CFM-MCNC: 45 NG/ML
BUPRENORPHINE/CREAT UR: 38 NG/MG {CREAT}
NALOXONE UR CFM-MCNC: 6 NG/ML
NALOXONE: 5 NG/MG {CREAT}
NORBUPRENORPHINE UR CFM-MCNC: 43 NG/ML
NORBUPRENORPHINE/CREAT UR: 37 NG/MG {CREAT}

## 2025-01-13 DIAGNOSIS — G89.29 CHRONIC PAIN OF BOTH HIPS: Primary | ICD-10-CM

## 2025-01-13 DIAGNOSIS — M25.551 CHRONIC PAIN OF BOTH HIPS: Primary | ICD-10-CM

## 2025-01-13 DIAGNOSIS — M25.552 CHRONIC PAIN OF BOTH HIPS: Primary | ICD-10-CM

## 2025-01-13 RX ORDER — GABAPENTIN 300 MG/1
300 CAPSULE ORAL 3 TIMES DAILY
Qty: 90 CAPSULE | Refills: 0 | Status: SHIPPED | OUTPATIENT
Start: 2025-01-13

## 2025-01-13 NOTE — TELEPHONE ENCOUNTER
Received fax from pharmacy requesting refill(s) for     Gabapentin 300MG CAPS     Date last filled 03.11.2024    Last Appt Date:12.30.2024    Next Appt scheduled: 04.01.2025    Pharmacy:      [facility] [address]IDE[address] [place], [place] [address] - [address] [place] [address]

## 2025-02-01 ENCOUNTER — HEALTH MAINTENANCE LETTER (OUTPATIENT)
Age: 40
End: 2025-02-01

## 2025-02-20 ENCOUNTER — TELEPHONE (OUTPATIENT)
Dept: PALLIATIVE MEDICINE | Facility: OTHER | Age: 40
End: 2025-02-20
Payer: COMMERCIAL

## 2025-02-20 NOTE — TELEPHONE ENCOUNTER
[facility] Call Center    Phone Message    May a detailed message be left on voicemail: yes     Reason for Call: Other: Patient stated she needs to be seen right away to discuss some concerns she's having regarding fatigue. Writer unable to find sooner appointment for patient . Patient would like the clinic to take a look and see if they can fit her in sooner she is even okay with a video visit       Action Taken: Message routed to:  Other: [facility]     Travel Screening: Not Applicable     Date of Service:

## 2025-03-19 DIAGNOSIS — K08.89 PAIN, DENTAL: ICD-10-CM

## 2025-03-19 RX ORDER — ONDANSETRON 4 MG/1
4 TABLET, ORALLY DISINTEGRATING ORAL EVERY 6 HOURS PRN
Qty: 15 TABLET | Refills: 0 | Status: SHIPPED | OUTPATIENT
Start: 2025-03-19

## 2025-03-19 NOTE — TELEPHONE ENCOUNTER
[facility]    Phone Message    May a detailed message be left on voicemail: yes     Reason for Call: Medication Refill Request    Has the patient contacted the pharmacy for the refill? Yes   Name of medication being requested: ondansetron (ZOFRAN) 4 MG tablet   Provider who prescribed the medication: Sudhakar Campbell MD   Pharmacy: [address] [place] [address]   Date medication is needed: 3/19/2025     Action Taken: Message routed to:  Other: [facility]    Travel Screening: Not Applicable     Date of Service:

## 2025-03-19 NOTE — TELEPHONE ENCOUNTER
Please review as this medication has not been filled for about 1 year  Pending Prescriptions:                       Disp   Refills    ondansetron (ZOFRAN ODT) 4 MG ODT tab     15 tab*0            Sig: Take 1 tablet (4 mg) by mouth every 6 hours as           needed for nausea. Ok to take 2 tabs for 1st           dose.

## 2025-03-19 NOTE — TELEPHONE ENCOUNTER
Received call from patient  requesting refill(s) for ondansetron (ZOFRAN) 4 MG tablet      Last refilled on: Nov. 16, 2024           Patient last seen on: Dec. 30, 2024   Next appt scheduled for: apr. 1, 2025         E-prescribe to:     [address] [place] [address]       Will facilitate refill.      Daniela Stanley, Clinic Facilitator  [facility] [place]

## 2025-03-27 DIAGNOSIS — G89.29 CHRONIC PAIN OF BOTH HIPS: ICD-10-CM

## 2025-03-27 DIAGNOSIS — M25.552 CHRONIC PAIN OF BOTH HIPS: ICD-10-CM

## 2025-03-27 DIAGNOSIS — M25.551 CHRONIC PAIN OF BOTH HIPS: ICD-10-CM

## 2025-03-27 RX ORDER — BUPRENORPHINE 20 UG/H
1 PATCH TRANSDERMAL
Qty: 4 PATCH | Refills: 2 | Status: SHIPPED | OUTPATIENT
Start: 2025-03-27

## 2025-03-27 NOTE — TELEPHONE ENCOUNTER
Received request for a refill(s) of buprenorphine (BUTRANS) 20 MCG/HR WK patch      Last dispensed from pharmacy on 2/27/2025    Patient's last office/virtual visit by prescribing provider on 12/30/2024  Next office/virtual appointment scheduled for 4/1/2025    Last urine drug screen date 12/30/2024  Current opioid agreement on file (completed within the last year) YesDate of opioid agreement: 12/31/2024    [place] [address] [place] [address] [place] - [address] [place] [address]  pharmacy    Will route to [facility] for review and preparation of prescription(s).

## 2025-03-27 NOTE — TELEPHONE ENCOUNTER
[facility] Call Center    Phone Message    May a detailed message be left on voicemail: yes     Reason for Call: Medication Refill Request    Has the patient contacted the pharmacy for the refill? Yes   Name of medication being requested: buprenorphine (BUTRANS) 20 MCG/HR WK patch  Provider who prescribed the medication: Dr. Campbell  Pharmacy: [address] [place] [address]  Date medication is needed: 3/27/25  Patient reminded of 5-7 day refill policy    Action Taken: Message routed to:  Other: [facility]    Travel Screening: Not Applicable

## 2025-04-01 ENCOUNTER — OFFICE VISIT (OUTPATIENT)
Dept: PALLIATIVE MEDICINE | Facility: OTHER | Age: 40
End: 2025-04-01
Payer: COMMERCIAL

## 2025-04-01 VITALS — OXYGEN SATURATION: 97 % | SYSTOLIC BLOOD PRESSURE: 127 MMHG | HEART RATE: 74 BPM | DIASTOLIC BLOOD PRESSURE: 66 MMHG

## 2025-04-01 DIAGNOSIS — K08.89 PAIN, DENTAL: ICD-10-CM

## 2025-04-01 DIAGNOSIS — M70.51 BURSITIS OF RIGHT KNEE, UNSPECIFIED BURSA: ICD-10-CM

## 2025-04-01 PROCEDURE — 99213 OFFICE O/P EST LOW 20 MIN: CPT | Performed by: ANESTHESIOLOGY

## 2025-04-01 PROCEDURE — G2211 COMPLEX E/M VISIT ADD ON: HCPCS | Performed by: ANESTHESIOLOGY

## 2025-04-01 PROCEDURE — 1126F AMNT PAIN NOTED NONE PRSNT: CPT | Performed by: ANESTHESIOLOGY

## 2025-04-01 PROCEDURE — 3078F DIAST BP <80 MM HG: CPT | Performed by: ANESTHESIOLOGY

## 2025-04-01 PROCEDURE — 3074F SYST BP LT 130 MM HG: CPT | Performed by: ANESTHESIOLOGY

## 2025-04-01 PROCEDURE — G0463 HOSPITAL OUTPT CLINIC VISIT: HCPCS | Performed by: ANESTHESIOLOGY

## 2025-04-01 RX ORDER — MELOXICAM 15 MG/1
15 TABLET ORAL DAILY
Qty: 30 TABLET | Refills: 4 | Status: SHIPPED | OUTPATIENT
Start: 2025-04-01

## 2025-04-01 RX ORDER — SENNA AND DOCUSATE SODIUM 50; 8.6 MG/1; MG/1
1 TABLET, FILM COATED ORAL AT BEDTIME
Qty: 30 TABLET | Refills: 4 | Status: SHIPPED | OUTPATIENT
Start: 2025-04-01

## 2025-04-01 RX ORDER — ONDANSETRON 4 MG/1
4 TABLET, ORALLY DISINTEGRATING ORAL EVERY 6 HOURS PRN
Qty: 15 TABLET | Refills: 0 | Status: SHIPPED | OUTPATIENT
Start: 2025-04-01

## 2025-04-01 RX ORDER — CHOLECALCIFEROL (VITAMIN D3) 50 MCG
2 TABLET ORAL DAILY
Qty: 60 TABLET | Refills: 3 | Status: SHIPPED | OUTPATIENT
Start: 2025-04-01

## 2025-04-01 ASSESSMENT — PAIN SCALES - GENERAL: PAINLEVEL_OUTOF10: NO PAIN (0)

## 2025-04-01 NOTE — PROGRESS NOTES
[facility] Follow-up    Date of visit: 4/1/2025    Chief complaint:   Chief Complaint   Patient presents with    Pain   .  Follow-up for history of hip pain      - Judy Peterson, 39-year-old female.  - Experiencing fatigue and exhaustion, with inconsistent energy levels throughout the week.  - Reports feeling tired and sleeping a lot, with increased energy on Fridays or Saturdays.  - Noticed fatigue possibly related to medications, but unsure which ones.  - Previously took gabapentin but stopped months ago due to lack of benefit.  - Works as a manager at [employer], 4-hour shifts, 5 days a week; feels fatigued after work and requires naps.  - Reports feeling better on weekends when not working.  - Vitamin D level was 25 in the fall; currently out of vitamin D supplements.  - C-reactive protein was elevated a year ago, indicating some inflammation.     Results  - Vitamin D level: 25 (measured in the fall)  - HLA test: Negative  - C-reactive protein: Elevated (measured a year ago)       - Inflammation noted, possibly contributing to overall discomfort.  - Continue with Butrans patch. Consider C15 supplement to decrease inflammation and potentially aid in reducing symptoms.                 Medications:  Current Outpatient Medications   Medication Sig Dispense Refill    acetaminophen (TYLENOL) 500 MG tablet Take 500-1,000 mg by mouth as needed for mild pain      albuterol (PROAIR HFA/PROVENTIL HFA/VENTOLIN HFA) 108 (90 Base) MCG/ACT inhaler Inhale 2 puffs into the lungs every 4 hours as needed for shortness of breath or wheezing 18 g 1    budesonide-formoterol (SYMBICORT) 160-4.5 MCG/ACT Inhaler Inhale 2 puffs twice daily plus 1-2 puffs as needed. May use up to 12 puffs per day. 20.4 g 11    buprenorphine (BUTRANS) 20 MCG/HR WK patch Place 1 patch onto the skin every 7 days. 4 patch 2    fluticasone (FLONASE) 50 MCG/ACT nasal spray Spray 1 spray into both  nostrils daily. May spray on to skin and allow to dry before applying butrans patch 9.9 mL 1    meloxicam (MOBIC) 15 MG tablet Take 1 tablet (15 mg) by mouth daily. 30 tablet 4    montelukast (SINGULAIR) 10 MG tablet Take 1 tablet (10 mg) by mouth at bedtime 90 tablet 3    ondansetron (ZOFRAN ODT) 4 MG ODT tab Take 1 tablet (4 mg) by mouth every 6 hours as needed for nausea. Ok to take 2 tabs for 1st dose. 15 tablet 0    ondansetron (ZOFRAN) 4 MG tablet Take 1 tablet (4 mg) by mouth every 8 hours as needed for nausea. 20 tablet 0    SENNA-docusate sodium (SENNA S) 8.6-50 MG tablet Take 1 tablet by mouth at bedtime. 30 tablet 4    tiZANidine (ZANAFLEX) 4 MG tablet TAKE ONE TABLET BY MOUTH AT BEDTIME 30 tablet 0    valACYclovir (VALTREX) 1000 mg tablet TAKE TWO TABLETS BY MOUTH TWICE A DAY 4 tablet 11    vitamin D2 (ERGOCALCIFEROL) 61891 units (1250 mcg) capsule Take 1 capsule (50,000 Units) by mouth once a week 12 capsule 0    vitamin D3 (CHOLECALCIFEROL) 50 mcg (2000 units) tablet Take 2 tablets (100 mcg) by mouth daily. 60 tablet 3           Physical Exam:  Blood pressure 127/66, pulse 74, SpO2 97%, not currently breastfeeding.    Alert, clear sensorium, no respiratory distress, no pain behavior.         Assessment:   Hip osteoarthritis, too young for surgery  Has found benefit from Butrans patch, relatively satisfied    Plan:    Continue Butrans patch 20 mcg weekly    Discussed use of C-15 as anti-inflammator    25 minutes spent on the date of encounter doing chart review, history, and exam documentation and further activities as noted above.   The longitudinal plan of care for the diagnosis(es)/condition(s) as documented were addressed during this visit. Due to the added complexity in care, I will continue to support Judy in the subsequent management and with ongoing continuity of care.    Sudhakar Campbell MD  [facility]

## 2025-04-01 NOTE — PROGRESS NOTES
Patient presents to the clinic today for a visit with NATALY DUNCAN MD regarding Pain Management.    {UDS/CSA- 12.30.2024    Medications- Butrans patch day four    Notes Patient reports she is really tired. Wants to sleep an then one day be feeling good.     Radha Schneider  [facility] Clinical Assistant

## 2025-04-01 NOTE — PATIENT INSTRUCTIONS
"PLAN:    Continue the Butrans patch 20 mcg with the Flonase applied beforehand to help with skin irritation.    Discussed the supplement \"C-15\", you may order online through \"fatty [employer]\".    Laboratories ordered as you may see your primary care provider soon to review your medical condition.    Follow-up with Dr. Campbell in 3 months  "

## 2025-05-06 DIAGNOSIS — K08.89 PAIN, DENTAL: ICD-10-CM

## 2025-05-06 DIAGNOSIS — R11.0 NAUSEA: ICD-10-CM

## 2025-05-06 RX ORDER — ONDANSETRON 4 MG/1
4 TABLET, ORALLY DISINTEGRATING ORAL EVERY 6 HOURS PRN
Qty: 15 TABLET | Refills: 0 | Status: SHIPPED | OUTPATIENT
Start: 2025-05-06

## 2025-05-06 NOTE — TELEPHONE ENCOUNTER
Received fax from pharmacy requesting refill(s) for     ondansetron (ZOFRAN ODT) 4 MG ODT tab    Date last filled 4/2/2025    Last Appt Date:4/1/2025    Next Appt scheduled: 7/14/2025    Pharmacy:      [facility] [place] [facility] [place] - [address] [place] [address] for processing    Niki Gutierrez [facility]

## 2025-05-12 ENCOUNTER — MYC REFILL (OUTPATIENT)
Dept: PALLIATIVE MEDICINE | Facility: OTHER | Age: 40
End: 2025-05-12
Payer: COMMERCIAL

## 2025-05-12 DIAGNOSIS — M25.552 CHRONIC PAIN OF BOTH HIPS: ICD-10-CM

## 2025-05-12 DIAGNOSIS — M25.551 CHRONIC PAIN OF BOTH HIPS: ICD-10-CM

## 2025-05-12 DIAGNOSIS — G89.29 CHRONIC PAIN OF BOTH HIPS: ICD-10-CM

## 2025-05-12 NOTE — TELEPHONE ENCOUNTER
Received fax from pharmacy requesting refill(s) for     tiZANidine (ZANAFLEX) 4 MG tablet    Date last filled 10/29/2024    Last Appt Date:4/1/2025    Next Appt scheduled: 7/14/2025    Pharmacy:      [facility] [place] [address] route for processing    Niki Gutierrez [facility]

## 2025-05-25 ENCOUNTER — HEALTH MAINTENANCE LETTER (OUTPATIENT)
Age: 40
End: 2025-05-25

## 2025-06-20 ENCOUNTER — MYC REFILL (OUTPATIENT)
Dept: PALLIATIVE MEDICINE | Facility: OTHER | Age: 40
End: 2025-06-20
Payer: COMMERCIAL

## 2025-06-20 ENCOUNTER — TELEPHONE (OUTPATIENT)
Dept: PALLIATIVE MEDICINE | Facility: OTHER | Age: 40
End: 2025-06-20

## 2025-06-20 DIAGNOSIS — G89.29 CHRONIC PAIN OF BOTH HIPS: ICD-10-CM

## 2025-06-20 DIAGNOSIS — M25.552 CHRONIC PAIN OF BOTH HIPS: ICD-10-CM

## 2025-06-20 DIAGNOSIS — M25.551 CHRONIC PAIN OF BOTH HIPS: ICD-10-CM

## 2025-06-23 RX ORDER — BUPRENORPHINE 20 UG/H
1 PATCH TRANSDERMAL
Status: CANCELLED
Start: 2025-06-23

## 2025-06-23 NOTE — TELEPHONE ENCOUNTER
Buprenorphine (Butrans) 20 mcg/hr patch  Last filled 5/20 for 28 days  Last visit 4/1  Next visit 7/14      UDS/CSA 12/30/24     Pending Prescriptions:                       Disp   Refills    buprenorphine (BUTRANS) 20 MCG/HR WK patch4 patch2            Sig: Place 1 patch onto the skin every 7 days.

## 2025-06-23 NOTE — TELEPHONE ENCOUNTER
Prior Authorization was started on 06/20/25. It is now expedited. Please speak with provider to see if there is anything that can be done while waiting.

## 2025-06-24 NOTE — TELEPHONE ENCOUNTER
Returned call to [facility] and answered questions re: whether pain was chronic or acute, etc. Rep said insurance will make a decision by 1500 tomorrow whether to approve or deny. Writer informed rep the pt has been using patches since at least 11/2024 with good pain relief that enables her to work part-time..

## 2025-08-07 ENCOUNTER — OFFICE VISIT (OUTPATIENT)
Dept: PALLIATIVE MEDICINE | Facility: OTHER | Age: 40
End: 2025-08-07
Attending: ANESTHESIOLOGY
Payer: COMMERCIAL

## 2025-08-07 VITALS — SYSTOLIC BLOOD PRESSURE: 132 MMHG | HEART RATE: 77 BPM | OXYGEN SATURATION: 98 % | DIASTOLIC BLOOD PRESSURE: 80 MMHG

## 2025-08-07 DIAGNOSIS — M25.551 CHRONIC PAIN OF BOTH HIPS: ICD-10-CM

## 2025-08-07 DIAGNOSIS — M25.552 CHRONIC PAIN OF BOTH HIPS: ICD-10-CM

## 2025-08-07 DIAGNOSIS — M53.3 DISORDER OF SACRUM: Primary | ICD-10-CM

## 2025-08-07 DIAGNOSIS — G89.29 CHRONIC PAIN OF BOTH HIPS: ICD-10-CM

## 2025-08-07 PROCEDURE — G0463 HOSPITAL OUTPT CLINIC VISIT: HCPCS | Performed by: ANESTHESIOLOGY

## 2025-08-07 RX ORDER — BUPRENORPHINE 20 UG/H
1 PATCH TRANSDERMAL
Qty: 4 PATCH | Refills: 2 | Status: SHIPPED | OUTPATIENT
Start: 2025-08-07

## 2025-08-07 ASSESSMENT — PAIN SCALES - GENERAL: PAINLEVEL_OUTOF10: NO PAIN (0)
